# Patient Record
Sex: MALE | Race: WHITE | NOT HISPANIC OR LATINO | Employment: OTHER | URBAN - METROPOLITAN AREA
[De-identification: names, ages, dates, MRNs, and addresses within clinical notes are randomized per-mention and may not be internally consistent; named-entity substitution may affect disease eponyms.]

---

## 2022-04-11 ENCOUNTER — TELEPHONE (OUTPATIENT)
Dept: NEPHROLOGY | Facility: MEDICAL CENTER | Age: 52
End: 2022-04-11

## 2022-04-11 NOTE — TELEPHONE ENCOUNTER
Hello Dr.Najjar,  New patient scheduled to see you via Telemed in May.   Would you like to order labs for him to complete ?

## 2022-05-03 ENCOUNTER — TELEMEDICINE2 (OUTPATIENT)
Dept: NEPHROLOGY | Facility: MEDICAL CENTER | Age: 52
End: 2022-05-03
Payer: MEDICAID

## 2022-05-03 VITALS
HEART RATE: 66 BPM | WEIGHT: 198 LBS | TEMPERATURE: 98 F | BODY MASS INDEX: 30.01 KG/M2 | OXYGEN SATURATION: 96 % | RESPIRATION RATE: 14 BRPM | DIASTOLIC BLOOD PRESSURE: 78 MMHG | SYSTOLIC BLOOD PRESSURE: 120 MMHG | HEIGHT: 68 IN

## 2022-05-03 DIAGNOSIS — N18.9 CHRONIC KIDNEY DISEASE, UNSPECIFIED CKD STAGE: ICD-10-CM

## 2022-05-03 DIAGNOSIS — I10 PRIMARY HYPERTENSION: ICD-10-CM

## 2022-05-03 DIAGNOSIS — N20.0 CALCULUS OF KIDNEY: ICD-10-CM

## 2022-05-03 PROCEDURE — 99204 OFFICE O/P NEW MOD 45 MIN: CPT | Performed by: INTERNAL MEDICINE

## 2022-05-03 RX ORDER — DIAZEPAM 5 MG/1
5 TABLET ORAL
Status: ON HOLD | COMMUNITY
Start: 2022-04-29 | End: 2023-11-25

## 2022-05-03 RX ORDER — ATORVASTATIN CALCIUM 80 MG/1
80 TABLET, FILM COATED ORAL DAILY
Status: ON HOLD | COMMUNITY
Start: 2022-03-02 | End: 2023-11-25 | Stop reason: SDUPTHER

## 2022-05-03 RX ORDER — POTASSIUM CHLORIDE 20 MEQ/1
20 TABLET, EXTENDED RELEASE ORAL
Status: ON HOLD | COMMUNITY
Start: 2022-03-11 | End: 2023-11-25

## 2022-05-03 RX ORDER — NITROGLYCERIN 0.3 MG/1
0.3 TABLET SUBLINGUAL PRN
Status: ON HOLD | COMMUNITY
Start: 2022-03-02 | End: 2023-11-25 | Stop reason: SDUPTHER

## 2022-05-03 RX ORDER — FUROSEMIDE 40 MG/1
40 TABLET ORAL
Status: ON HOLD | COMMUNITY
Start: 2022-03-11 | End: 2023-11-25

## 2022-05-03 RX ORDER — ISOSORBIDE MONONITRATE 120 MG/1
120 TABLET, EXTENDED RELEASE ORAL EVERY MORNING
Status: ON HOLD | COMMUNITY
Start: 2022-03-08 | End: 2023-11-25 | Stop reason: SDUPTHER

## 2022-05-03 RX ORDER — CLOPIDOGREL BISULFATE 75 MG/1
75 TABLET ORAL DAILY
Status: ON HOLD | COMMUNITY
Start: 2022-03-02 | End: 2023-11-25

## 2022-05-03 RX ORDER — HYDROGEN PEROXIDE 2.65 ML/100ML
81 LIQUID ORAL; TOPICAL DAILY
Status: ON HOLD | COMMUNITY
Start: 2022-03-08 | End: 2023-11-25 | Stop reason: SDUPTHER

## 2022-05-03 RX ORDER — EPINEPHRINE 0.3 MG/.3ML
INJECTION SUBCUTANEOUS
Status: ON HOLD | COMMUNITY
Start: 2022-03-08 | End: 2023-11-25 | Stop reason: SDUPTHER

## 2022-05-03 RX ORDER — CARVEDILOL 6.25 MG/1
6.25 TABLET ORAL 2 TIMES DAILY
Status: ON HOLD | COMMUNITY
Start: 2022-03-02 | End: 2023-11-25 | Stop reason: SDUPTHER

## 2022-05-03 RX ORDER — DIPHENHYDRAMINE HYDROCHLORIDE 50 MG/ML
50 INJECTION INTRAMUSCULAR; INTRAVENOUS
Status: ON HOLD | COMMUNITY
Start: 2022-03-16 | End: 2023-11-25

## 2022-05-03 RX ORDER — MEMANTINE HYDROCHLORIDE 10 MG/1
10 TABLET ORAL
Status: ON HOLD | COMMUNITY
Start: 2022-04-29 | End: 2023-11-25 | Stop reason: SDUPTHER

## 2022-05-03 RX ORDER — PRAZOSIN HYDROCHLORIDE 2 MG/1
2 CAPSULE ORAL
Status: ON HOLD | COMMUNITY
Start: 2022-04-29 | End: 2023-11-25

## 2022-05-03 RX ORDER — OMEPRAZOLE 40 MG/1
40 CAPSULE, DELAYED RELEASE ORAL DAILY
Status: ON HOLD | COMMUNITY
Start: 2022-03-08 | End: 2023-11-25 | Stop reason: SDUPTHER

## 2022-05-03 RX ORDER — APIXABAN 5 MG/1
5 TABLET, FILM COATED ORAL 2 TIMES DAILY
Status: ON HOLD | COMMUNITY
Start: 2022-04-07 | End: 2023-11-25 | Stop reason: SDUPTHER

## 2022-05-03 RX ORDER — BACLOFEN 20 MG/1
20 TABLET ORAL
Status: ON HOLD | COMMUNITY
Start: 2022-02-10 | End: 2023-11-25

## 2022-05-03 NOTE — PROGRESS NOTES
Telemedicine: New Patient   This evaluation was conducted via [employer] using secure and encrypted videoconferencing technology. The patient was physically located at [facility] in [place], [place]. The patient was presented by a medical professional at the originating site.   The patient's identity was confirmed and verbal consent was obtained for this telemedicine encounter.    Subjective:     CC:   Chief Complaint   Patient presents with   • Hypertension   • Chronic Kidney Disease       Divnie Lugo is a 51 y.o. male presenting to establish care and to discuss the evaluation and management of: Hypertension and chronic kidney disease  The patient is a very poor historian, he has a history of traumatic brain injury, most of the story was reviewing her record.  Briefly he is a 51-year-old gentleman with a past medical history significant for longstanding pretension, recurrent nephrolithiasis.  Patient also has a history of partial nephrectomy, however the patient is not very sure about the details  Patient has no hematuria no dysuria, he does have chronic pain but no recent use of NSAIDs.    Review of Systems   Unable to perform ROS: Medical condition         Allergies   Allergen Reactions   • Bloodless      Pt states he would accept blood in life or death situation   • Nkda [No Known Drug Allergy]        Current medicines (including changes today)  Current Outpatient Medications   Medication Sig Dispense Refill   • Oxycodone-Acetaminophen (PERCOCET)  MG TABS Take 1-2 Tabs by mouth every 6 hours as needed for Severe Pain. 30 Tab 0   • LISINOPRIL Take 40 mg by mouth every day.     • Sertraline HCl (ZOLOFT PO) Take 100 mg by mouth every day.     • trazodone (DESYREL) 100 MG TABS Take 100 mg by mouth every evening.     • ELIQUIS 5 MG Tab Take 5 mg by mouth 2 times a day.     • EQ ASPIRIN ADULT LOW DOSE 81 MG EC tablet Take 81 mg by mouth every day.     • atorvastatin (LIPITOR) 80 MG tablet Take  80 mg by mouth every day.     • baclofen (LIORESAL) 20 MG tablet Take 20 mg by mouth.     • carvedilol (COREG) 6.25 MG Tab Take 6.25 mg by mouth 2 times a day.     • EPINEPHrine (EPIPEN) 0.3 MG/0.3ML Solution Auto-injector solution for injection INJECT CONTENTS OF 1 PEN SUBCUTANEOUSLY AS NEEDED FOR ALLERGIC REACTION MAY REPEAT DOSE AFTER 5 TO 15 MINTUTES     • furosemide (LASIX) 40 MG Tab Take 40 mg by mouth.     • clopidogrel (PLAVIX) 75 MG Tab Take 75 mg by mouth every day.     • diazePAM (VALIUM) 5 MG Tab Take 5 mg by mouth.     • diphenhydrAMINE (BENADRYL) 50 MG/ML Solution 50 mg by Other route.     • nitroGLYCERIN (NITROSTAT) 0.3 MG SL tablet Place 0.3 mg under the tongue as needed. DISSOLVE ONE TABLET UNDER THE TONGUE EVERY 5 MINUTES AS NEEDED FOR CHEST PAIN DO NOT EXCEED A TOTAL OF 3 DOSES IN 15 MINUTES     • memantine (NAMENDA) 10 MG Tab Take 10 mg by mouth.     • isosorbide mononitrate (IMDUR) 120 MG CR tablet Take 120 mg by mouth every morning.     • omeprazole (PRILOSEC) 40 MG delayed-release capsule Take 40 mg by mouth every day.     • potassium chloride SA (KDUR) 20 MEQ Tab CR Take 20 mEq by mouth.     • prazosin (MINIPRESS) 2 MG Cap Take 2 mg by mouth at bedtime.     • ondansetron (ZOFRAN ODT) 4 MG TBDP Take 1 Tab by mouth every 8 hours as needed for Nausea/Vomiting. (Patient not taking: Reported on 5/3/2022) 12 Tab 0   • morphine SR (MS CONTIN) 15 MG TB12 Take 30 mg by mouth 2 times a day. (Patient not taking: Reported on 5/3/2022)       No current facility-administered medications for this visit.       He  has a past medical history of Angina, Arthritis, Back fracture, Backpain, CAD (coronary artery disease), DVT, Fracture closed, pelvis, HTN (hypertension) (6/7/2012), Hypertension, Lipoma NOS, Myocardial infarct (HCC), Nephrolithiasis (6/7/2012), Nonmalignant tumors, Personal history of venous thrombosis and embolism, Pneumonia, Pulmonary embolism (HCC), Renal disorder, and Unspecified urinary  "incontinence (PE).    He has no past medical history of Arrhythmia, ASTHMA, Bronchitis, Cancer ([facility]), CATARACT, Congestive heart failure ([facility]), COPD, Diabetes, Dialysis, Glaucoma, Heart murmur, Heart valve disease, Indigestion, Infectious disease, Jaundice, Other specified symptom associated with female genital organs, Pacemaker, Psychiatric problem, Rheumatic fever, Seizure ([facility]), Stroke ([facility]), Unspecified disorder of thyroid, or Unspecified hemorrhagic conditions.  He  has a past surgical history that includes other orthopedic surgery; other; other cardiac surgery; cystoscopy stent placement (6/3/2012); ureteroscopy (6/3/2012); and lasertripsy (6/3/2012).      History reviewed. No pertinent family history.  No family status information on file.       Patient Active Problem List    Diagnosis Date Noted   • HTN (hypertension) 06/07/2012   • MI (myocardial infarction) ([facility]) 06/07/2012   • HEMATURIA 06/07/2012   • Nephrolithiasis 06/07/2012   • ARF (acute renal failure) ([facility]) 06/07/2012   • Obesity 06/07/2012          Objective:   /78   Pulse 66   Temp 36.7 °C (98 °F)   Resp 14   Ht 1.727 m (5' 8\")   Wt 89.8 kg (198 lb)   SpO2 96%   BMI 30.11 kg/m²     Physical Exam  Constitutional:       General: He is not in acute distress.     Appearance: Normal appearance. He is not ill-appearing.   HENT:      Head: Normocephalic and atraumatic.      Right Ear: External ear normal.      Left Ear: External ear normal.      Nose: Nose normal.   Eyes:      General: No scleral icterus.        Right eye: No discharge.         Left eye: No discharge.      Conjunctiva/sclera: Conjunctivae normal.   Cardiovascular:      Rate and Rhythm: Normal rate.   Pulmonary:      Effort: Pulmonary effort is normal. No respiratory distress.      Breath sounds: Normal breath sounds. No rhonchi.   Musculoskeletal:         General: No tenderness.      Right lower leg: No edema.      Left lower leg: No edema.   Skin:     General: Skin is warm. "      Coloration: Skin is not jaundiced.   Neurological:      General: No focal deficit present.      Mental Status: He is alert and oriented to person, place, and time. Mental status is at baseline.   Psychiatric:         Mood and Affect: Mood normal.         Behavior: Behavior normal.         Thought Content: Thought content normal.     I reviewed the outside records, recent creatinine from April 2022 was 1.3 mg/dL      Assessment and Plan:   The following treatment plan was discussed:     1. Primary hypertension  Controlled  Continue same medication regimen  Continue low-sodium diet      2. Nephrolithiasis  Patient was advised to be on low-sodium diet  Increase water intake to about 80 ounces per day  - US-RENAL; Future    3. Chronic kidney disease, unspecified CKD stage    Kidney function is within normal range  Check renal ultrasound to evaluate kidney size  Avoid nephrotoxins like NSAIDs  Renal dose on medication  Recheck labs in 3 months      Follow-up: Return in about 3 months (around 8/3/2022).

## 2022-08-03 ENCOUNTER — TELEMEDICINE2 (OUTPATIENT)
Dept: NEPHROLOGY | Facility: MEDICAL CENTER | Age: 52
End: 2022-08-03
Payer: MEDICAID

## 2022-08-03 VITALS
TEMPERATURE: 98.2 F | HEIGHT: 68 IN | HEART RATE: 60 BPM | DIASTOLIC BLOOD PRESSURE: 80 MMHG | SYSTOLIC BLOOD PRESSURE: 136 MMHG | OXYGEN SATURATION: 93 % | BODY MASS INDEX: 29.86 KG/M2 | RESPIRATION RATE: 16 BRPM | WEIGHT: 197 LBS

## 2022-08-03 DIAGNOSIS — N18.9 CHRONIC KIDNEY DISEASE, UNSPECIFIED CKD STAGE: ICD-10-CM

## 2022-08-03 DIAGNOSIS — I10 PRIMARY HYPERTENSION: ICD-10-CM

## 2022-08-03 DIAGNOSIS — N20.0 NEPHROLITHIASIS: ICD-10-CM

## 2022-08-03 PROCEDURE — 99214 OFFICE O/P EST MOD 30 MIN: CPT | Performed by: INTERNAL MEDICINE

## 2022-08-03 RX ORDER — FLUTICASONE PROPIONATE 50 MCG
SPRAY, SUSPENSION (ML) NASAL
Status: ON HOLD | COMMUNITY
Start: 2022-05-24 | End: 2023-11-25

## 2022-08-03 RX ORDER — KETOROLAC TROMETHAMINE 30 MG/ML
INJECTION, SOLUTION INTRAMUSCULAR; INTRAVENOUS
Status: ON HOLD | COMMUNITY
Start: 2022-05-19 | End: 2023-11-25

## 2022-08-03 RX ORDER — AMOXICILLIN AND CLAVULANATE POTASSIUM 875; 125 MG/1; MG/1
TABLET, FILM COATED ORAL
Status: ON HOLD | COMMUNITY
Start: 2022-05-24 | End: 2023-11-25

## 2022-08-03 ASSESSMENT — ENCOUNTER SYMPTOMS
BACK PAIN: 1
VOMITING: 0
FEVER: 0
CHILLS: 0
COUGH: 0
SHORTNESS OF BREATH: 0
NAUSEA: 0

## 2022-08-03 NOTE — PROGRESS NOTES
Telemedicine: Established Patient   This evaluation was conducted via [employer] using secure and encrypted videoconferencing technology. The patient was physically located at [facility] in [place], [place]. The patient was presented by a medical professional at the originating site.   The patient's identity was confirmed and verbal consent was obtained for this telemedicine encounter.    Subjective:   CC:   Chief Complaint   Patient presents with   • Hypertension   • Chronic Kidney Disease       Divine Lugo is a 51 y.o. male presenting for evaluation and management of: CKD, HTN  Patient has a history of longstanding hypertension, his blood pressure is within good control, he has no chest pain, no shortness of breath.  Patient has chronic kidney disease, however recent creatinine from July 25, 2022 was 1.2 mg/dL  Patient has no hematuria, no dysuria.  No recent use of NSAIDs.  Review of Systems   Constitutional: Negative for chills, fever and malaise/fatigue.   Respiratory: Negative for cough and shortness of breath.    Cardiovascular: Negative for chest pain and leg swelling.   Gastrointestinal: Negative for nausea and vomiting.   Genitourinary: Negative for dysuria, frequency and urgency.   Musculoskeletal: Positive for back pain.         Allergies   Allergen Reactions   • Gabapentin Unspecified     Causes patient to seizures   • Bloodless      Pt states he would accept blood in life or death situation   • Nkda [No Known Drug Allergy]        Current medicines (including changes today)  Current Outpatient Medications   Medication Sig Dispense Refill   • ELIQUIS 5 MG Tab Take 5 mg by mouth 2 times a day.     • EQ ASPIRIN ADULT LOW DOSE 81 MG EC tablet Take 81 mg by mouth every day.     • atorvastatin (LIPITOR) 80 MG tablet Take 80 mg by mouth every day.     • baclofen (LIORESAL) 20 MG tablet Take 20 mg by mouth.     • carvedilol (COREG) 6.25 MG Tab Take 6.25 mg by mouth 2 times a day.     • EPINEPHrine  (EPIPEN) 0.3 MG/0.3ML Solution Auto-injector solution for injection INJECT CONTENTS OF 1 PEN SUBCUTANEOUSLY AS NEEDED FOR ALLERGIC REACTION MAY REPEAT DOSE AFTER 5 TO 15 MINTUTES     • furosemide (LASIX) 40 MG Tab Take 40 mg by mouth.     • clopidogrel (PLAVIX) 75 MG Tab Take 75 mg by mouth every day.     • diazePAM (VALIUM) 5 MG Tab Take 5 mg by mouth.     • nitroGLYCERIN (NITROSTAT) 0.3 MG SL tablet Place 0.3 mg under the tongue as needed. DISSOLVE ONE TABLET UNDER THE TONGUE EVERY 5 MINUTES AS NEEDED FOR CHEST PAIN DO NOT EXCEED A TOTAL OF 3 DOSES IN 15 MINUTES     • memantine (NAMENDA) 10 MG Tab Take 10 mg by mouth.     • isosorbide mononitrate (IMDUR) 120 MG CR tablet Take 120 mg by mouth every morning.     • omeprazole (PRILOSEC) 40 MG delayed-release capsule Take 40 mg by mouth every day.     • potassium chloride SA (KDUR) 20 MEQ Tab CR Take 20 mEq by mouth.     • Oxycodone-Acetaminophen (PERCOCET)  MG TABS Take 1-2 Tabs by mouth every 6 hours as needed for Severe Pain. 30 Tab 0   • ondansetron (ZOFRAN ODT) 4 MG TBDP Take 1 Tab by mouth every 8 hours as needed for Nausea/Vomiting. 12 Tab 0   • LISINOPRIL Take 40 mg by mouth every day.     • trazodone (DESYREL) 100 MG TABS Take 500 mg by mouth every evening.     • ketorolac (TORADOL) 30 MG/ML Solution  (Patient not taking: Reported on 8/3/2022)     • fluticasone (FLONASE) 50 MCG/ACT nasal spray  (Patient not taking: Reported on 8/3/2022)     • amoxicillin-clavulanate (AUGMENTIN) 875-125 MG Tab  (Patient not taking: Reported on 8/3/2022)     • diphenhydrAMINE (BENADRYL) 50 MG/ML Solution 50 mg by Other route. (Patient not taking: Reported on 8/3/2022)     • prazosin (MINIPRESS) 2 MG Cap Take 2 mg by mouth at bedtime. (Patient not taking: Reported on 8/3/2022)     • morphine SR (MS CONTIN) 15 MG TB12 Take 30 mg by mouth 2 times a day. (Patient not taking: No sig reported)     • Sertraline HCl (ZOLOFT PO) Take 100 mg by mouth every day. (Patient not taking:  "Reported on 8/3/2022)       No current facility-administered medications for this visit.       Patient Active Problem List    Diagnosis Date Noted   • HTN (hypertension) 06/07/2012   • MI (myocardial infarction) ([facility]) 06/07/2012   • HEMATURIA 06/07/2012   • Nephrolithiasis 06/07/2012   • ARF (acute renal failure) (HCC) 06/07/2012   • Obesity 06/07/2012       No family history on file.    He  has a past medical history of Angina, Arthritis, Back fracture, Backpain, CAD (coronary artery disease), DVT, Fracture closed, pelvis, HTN (hypertension) (6/7/2012), Hypertension, Lipoma NOS, Myocardial infarct (HCC), Nephrolithiasis (6/7/2012), Nonmalignant tumors, Personal history of venous thrombosis and embolism, Pneumonia, Pulmonary embolism (HCC), Renal disorder, and Unspecified urinary incontinence (PE).    He has no past medical history of Arrhythmia, ASTHMA, Bronchitis, Cancer (HCC), CATARACT, Congestive heart failure (HCC), COPD, Diabetes, Dialysis, Glaucoma, Heart murmur, Heart valve disease, Indigestion, Infectious disease, Jaundice, Other specified symptom associated with female genital organs, Pacemaker, Psychiatric problem, Rheumatic fever, Seizure (HCC), Stroke (HCC), Unspecified disorder of thyroid, or Unspecified hemorrhagic conditions.  He  has a past surgical history that includes other orthopedic surgery; other; other cardiac surgery; cystoscopy stent placement (6/3/2012); ureteroscopy (6/3/2012); and lasertripsy (6/3/2012).       Objective:   /80 (BP Location: Right arm, Patient Position: Sitting, BP Cuff Size: Adult)   Pulse 60   Temp 36.8 °C (98.2 °F) (Temporal)   Resp 16   Ht 1.727 m (5' 8\")   Wt 89.4 kg (197 lb)   SpO2 93%   BMI 29.95 kg/m²     Physical Exam  Constitutional:       General: He is not in acute distress.     Appearance: Normal appearance. He is not ill-appearing.   HENT:      Head: Normocephalic and atraumatic.      Nose: Nose normal.   Eyes:      General: No scleral " icterus.     Conjunctiva/sclera: Conjunctivae normal.   Pulmonary:      Effort: Pulmonary effort is normal.   Skin:     General: Skin is warm.      Coloration: Skin is not jaundiced.   Neurological:      General: No focal deficit present.      Mental Status: He is alert and oriented to person, place, and time. Mental status is at baseline.   Psychiatric:         Mood and Affect: Mood normal.         Thought Content: Thought content normal.     Recent lab from July 25, 2022 were reviewed    Assessment and Plan:   The following treatment plan was discussed:     1. Primary hypertension  Controlled  Continue same medication regimen  Continue low-sodium diet  Thank you    2. Chronic kidney disease, unspecified CKD stage  Stable  No uremic symptoms  Renal dose of medication  Avoid nephrotoxins  Continue same medication regimen  Check labs annually  Follow-up in the renal clinic on as-needed basis    3. Nephrolithiasis  Low-sodium diet  Increase water intake  Check renal ultrasound if back pain continues        Follow-up: No follow-ups on file.

## 2023-11-21 ENCOUNTER — HOSPITAL ENCOUNTER (OUTPATIENT)
Dept: RADIOLOGY | Facility: MEDICAL CENTER | Age: 53
End: 2023-11-21

## 2023-11-21 ENCOUNTER — HOSPITAL ENCOUNTER (INPATIENT)
Facility: MEDICAL CENTER | Age: 53
LOS: 4 days | DRG: 065 | End: 2023-11-25
Attending: STUDENT IN AN ORGANIZED HEALTH CARE EDUCATION/TRAINING PROGRAM | Admitting: STUDENT IN AN ORGANIZED HEALTH CARE EDUCATION/TRAINING PROGRAM
Payer: MEDICAID

## 2023-11-21 DIAGNOSIS — I63.9 ACUTE CVA (CEREBROVASCULAR ACCIDENT) (HCC): ICD-10-CM

## 2023-11-21 DIAGNOSIS — I63.9 STROKE DETERMINED BY CLINICAL ASSESSMENT (HCC): ICD-10-CM

## 2023-11-21 DIAGNOSIS — N20.0 CALCULUS OF KIDNEY: ICD-10-CM

## 2023-11-21 DIAGNOSIS — I25.10 CORONARY ARTERY DISEASE WITHOUT ANGINA PECTORIS, UNSPECIFIED VESSEL OR LESION TYPE, UNSPECIFIED WHETHER NATIVE OR TRANSPLANTED HEART: ICD-10-CM

## 2023-11-21 DIAGNOSIS — K21.9 GASTROESOPHAGEAL REFLUX DISEASE, UNSPECIFIED WHETHER ESOPHAGITIS PRESENT: ICD-10-CM

## 2023-11-21 DIAGNOSIS — G89.29 CHRONIC NECK PAIN: ICD-10-CM

## 2023-11-21 DIAGNOSIS — Z86.59 HISTORY OF POSTTRAUMATIC STRESS DISORDER (PTSD): ICD-10-CM

## 2023-11-21 DIAGNOSIS — M54.2 CHRONIC NECK PAIN: ICD-10-CM

## 2023-11-21 LAB
EST. AVERAGE GLUCOSE BLD GHB EST-MCNC: 97 MG/DL
HBA1C MFR BLD: 5 % (ref 4–5.6)

## 2023-11-21 PROCEDURE — 700111 HCHG RX REV CODE 636 W/ 250 OVERRIDE (IP): Mod: JZ | Performed by: STUDENT IN AN ORGANIZED HEALTH CARE EDUCATION/TRAINING PROGRAM

## 2023-11-21 PROCEDURE — 700102 HCHG RX REV CODE 250 W/ 637 OVERRIDE(OP): Performed by: STUDENT IN AN ORGANIZED HEALTH CARE EDUCATION/TRAINING PROGRAM

## 2023-11-21 PROCEDURE — 770006 HCHG ROOM/CARE - MED/SURG/GYN SEMI*

## 2023-11-21 PROCEDURE — A9270 NON-COVERED ITEM OR SERVICE: HCPCS | Performed by: STUDENT IN AN ORGANIZED HEALTH CARE EDUCATION/TRAINING PROGRAM

## 2023-11-21 PROCEDURE — 83036 HEMOGLOBIN GLYCOSYLATED A1C: CPT

## 2023-11-21 PROCEDURE — 36415 COLL VENOUS BLD VENIPUNCTURE: CPT

## 2023-11-21 PROCEDURE — 99223 1ST HOSP IP/OBS HIGH 75: CPT | Performed by: STUDENT IN AN ORGANIZED HEALTH CARE EDUCATION/TRAINING PROGRAM

## 2023-11-21 RX ORDER — ISOSORBIDE MONONITRATE 60 MG/1
120 TABLET, EXTENDED RELEASE ORAL EVERY MORNING
Status: DISCONTINUED | OUTPATIENT
Start: 2023-11-22 | End: 2023-11-25 | Stop reason: HOSPADM

## 2023-11-21 RX ORDER — MORPHINE SULFATE 4 MG/ML
4 INJECTION INTRAVENOUS EVERY 4 HOURS PRN
Status: DISCONTINUED | OUTPATIENT
Start: 2023-11-21 | End: 2023-11-25 | Stop reason: HOSPADM

## 2023-11-21 RX ORDER — ACETAMINOPHEN 325 MG/1
650 TABLET ORAL EVERY 6 HOURS PRN
Status: DISCONTINUED | OUTPATIENT
Start: 2023-11-21 | End: 2023-11-25 | Stop reason: HOSPADM

## 2023-11-21 RX ORDER — MEMANTINE HYDROCHLORIDE 10 MG/1
10 TABLET ORAL DAILY
Status: DISCONTINUED | OUTPATIENT
Start: 2023-11-22 | End: 2023-11-25 | Stop reason: HOSPADM

## 2023-11-21 RX ORDER — ASPIRIN 81 MG/1
81 TABLET ORAL DAILY
Status: DISCONTINUED | OUTPATIENT
Start: 2023-11-22 | End: 2023-11-25 | Stop reason: HOSPADM

## 2023-11-21 RX ORDER — CARVEDILOL 6.25 MG/1
6.25 TABLET ORAL 2 TIMES DAILY
Status: DISCONTINUED | OUTPATIENT
Start: 2023-11-21 | End: 2023-11-25 | Stop reason: HOSPADM

## 2023-11-21 RX ORDER — ATORVASTATIN CALCIUM 80 MG/1
80 TABLET, FILM COATED ORAL DAILY
Status: DISCONTINUED | OUTPATIENT
Start: 2023-11-22 | End: 2023-11-25 | Stop reason: HOSPADM

## 2023-11-21 RX ORDER — CLOPIDOGREL BISULFATE 75 MG/1
75 TABLET ORAL DAILY
Status: DISCONTINUED | OUTPATIENT
Start: 2023-11-22 | End: 2023-11-22

## 2023-11-21 RX ADMIN — ACETAMINOPHEN 650 MG: 325 TABLET, FILM COATED ORAL at 23:10

## 2023-11-21 RX ADMIN — MORPHINE SULFATE 4 MG: 4 INJECTION, SOLUTION INTRAMUSCULAR; INTRAVENOUS at 21:39

## 2023-11-21 RX ADMIN — APIXABAN 5 MG: 5 TABLET, FILM COATED ORAL at 23:07

## 2023-11-21 RX ADMIN — TRAZODONE HYDROCHLORIDE 500 MG: 150 TABLET ORAL at 23:07

## 2023-11-21 RX ADMIN — CARVEDILOL 6.25 MG: 6.25 TABLET, FILM COATED ORAL at 23:07

## 2023-11-21 ASSESSMENT — ENCOUNTER SYMPTOMS
CARDIOVASCULAR NEGATIVE: 1
PSYCHIATRIC NEGATIVE: 1
EYES NEGATIVE: 1
GASTROINTESTINAL NEGATIVE: 1
MUSCULOSKELETAL NEGATIVE: 1
FOCAL WEAKNESS: 1
RESPIRATORY NEGATIVE: 1
WEAKNESS: 1

## 2023-11-21 ASSESSMENT — LIFESTYLE VARIABLES
HAVE YOU EVER FELT YOU SHOULD CUT DOWN ON YOUR DRINKING: NO
TOTAL SCORE: 0
ON A TYPICAL DAY WHEN YOU DRINK ALCOHOL HOW MANY DRINKS DO YOU HAVE: 0
EVER FELT BAD OR GUILTY ABOUT YOUR DRINKING: NO
TOTAL SCORE: 0
EVER HAD A DRINK FIRST THING IN THE MORNING TO STEADY YOUR NERVES TO GET RID OF A HANGOVER: NO
CONSUMPTION TOTAL: NEGATIVE
DOES PATIENT WANT TO STOP DRINKING: NO
ON A TYPICAL DAY WHEN YOU DRINK ALCOHOL HOW MANY DRINKS DO YOU HAVE: 0
EVER FELT BAD OR GUILTY ABOUT YOUR DRINKING: NO
HOW MANY TIMES IN THE PAST YEAR HAVE YOU HAD 5 OR MORE DRINKS IN A DAY: 0
HAVE YOU EVER FELT YOU SHOULD CUT DOWN ON YOUR DRINKING: NO
TOTAL SCORE: 0
ALCOHOL_USE: NO
AVERAGE NUMBER OF DAYS PER WEEK YOU HAVE A DRINK CONTAINING ALCOHOL: 0
HAVE PEOPLE ANNOYED YOU BY CRITICIZING YOUR DRINKING: NO
HOW MANY TIMES IN THE PAST YEAR HAVE YOU HAD 5 OR MORE DRINKS IN A DAY: 0
AVERAGE NUMBER OF DAYS PER WEEK YOU HAVE A DRINK CONTAINING ALCOHOL: 0
TOTAL SCORE: 0
ALCOHOL_USE: NO
TOTAL SCORE: 0
CONSUMPTION TOTAL: NEGATIVE
HAVE PEOPLE ANNOYED YOU BY CRITICIZING YOUR DRINKING: NO
EVER HAD A DRINK FIRST THING IN THE MORNING TO STEADY YOUR NERVES TO GET RID OF A HANGOVER: NO
TOTAL SCORE: 0

## 2023-11-21 ASSESSMENT — COGNITIVE AND FUNCTIONAL STATUS - GENERAL
SUGGESTED CMS G CODE MODIFIER DAILY ACTIVITY: CK
EATING MEALS: A LITTLE
STANDING UP FROM CHAIR USING ARMS: A LITTLE
WALKING IN HOSPITAL ROOM: A LITTLE
MOVING TO AND FROM BED TO CHAIR: A LITTLE
MOVING FROM LYING ON BACK TO SITTING ON SIDE OF FLAT BED: A LITTLE
TURNING FROM BACK TO SIDE WHILE IN FLAT BAD: A LITTLE
DRESSING REGULAR UPPER BODY CLOTHING: A LITTLE
DRESSING REGULAR UPPER BODY CLOTHING: A LITTLE
CLIMB 3 TO 5 STEPS WITH RAILING: A LITTLE
DAILY ACTIVITIY SCORE: 18
SUGGESTED CMS G CODE MODIFIER DAILY ACTIVITY: CK
STANDING UP FROM CHAIR USING ARMS: A LITTLE
PERSONAL GROOMING: A LITTLE
SUGGESTED CMS G CODE MODIFIER MOBILITY: CK
SUGGESTED CMS G CODE MODIFIER MOBILITY: CK
MOBILITY SCORE: 18
DRESSING REGULAR LOWER BODY CLOTHING: A LITTLE
DRESSING REGULAR LOWER BODY CLOTHING: A LITTLE
MOVING TO AND FROM BED TO CHAIR: A LITTLE
TOILETING: A LITTLE
DAILY ACTIVITIY SCORE: 18
MOVING FROM LYING ON BACK TO SITTING ON SIDE OF FLAT BED: A LITTLE
TURNING FROM BACK TO SIDE WHILE IN FLAT BAD: A LITTLE
HELP NEEDED FOR BATHING: A LITTLE
HELP NEEDED FOR BATHING: A LITTLE
PERSONAL GROOMING: A LITTLE
EATING MEALS: A LITTLE
WALKING IN HOSPITAL ROOM: A LITTLE
CLIMB 3 TO 5 STEPS WITH RAILING: A LITTLE
MOBILITY SCORE: 18
TOILETING: A LITTLE

## 2023-11-21 ASSESSMENT — PATIENT HEALTH QUESTIONNAIRE - PHQ9
2. FEELING DOWN, DEPRESSED, IRRITABLE, OR HOPELESS: NOT AT ALL
1. LITTLE INTEREST OR PLEASURE IN DOING THINGS: NOT AT ALL
SUM OF ALL RESPONSES TO PHQ9 QUESTIONS 1 AND 2: 0
SUM OF ALL RESPONSES TO PHQ9 QUESTIONS 1 AND 2: 0
2. FEELING DOWN, DEPRESSED, IRRITABLE, OR HOPELESS: NOT AT ALL
1. LITTLE INTEREST OR PLEASURE IN DOING THINGS: NOT AT ALL

## 2023-11-21 ASSESSMENT — FIBROSIS 4 INDEX: FIB4 SCORE: 1.19

## 2023-11-21 ASSESSMENT — PAIN DESCRIPTION - PAIN TYPE: TYPE: ACUTE PAIN

## 2023-11-22 PROBLEM — I25.10 CAD (CORONARY ARTERY DISEASE): Status: ACTIVE | Noted: 2023-11-22

## 2023-11-22 PROBLEM — M54.2 CHRONIC NECK PAIN: Status: ACTIVE | Noted: 2023-11-22

## 2023-11-22 PROBLEM — R53.1 RIGHT SIDED WEAKNESS: Status: ACTIVE | Noted: 2023-11-22

## 2023-11-22 PROBLEM — G89.29 CHRONIC NECK PAIN: Status: ACTIVE | Noted: 2023-11-22

## 2023-11-22 LAB
ALBUMIN SERPL BCP-MCNC: 3.5 G/DL (ref 3.2–4.9)
ALBUMIN/GLOB SERPL: 1.4 G/DL
ALP SERPL-CCNC: 95 U/L (ref 30–99)
ALT SERPL-CCNC: 23 U/L (ref 2–50)
ANION GAP SERPL CALC-SCNC: 12 MMOL/L (ref 7–16)
AST SERPL-CCNC: 21 U/L (ref 12–45)
BILIRUB SERPL-MCNC: 0.4 MG/DL (ref 0.1–1.5)
BUN SERPL-MCNC: 14 MG/DL (ref 8–22)
CALCIUM ALBUM COR SERPL-MCNC: 8.7 MG/DL (ref 8.5–10.5)
CALCIUM SERPL-MCNC: 8.3 MG/DL (ref 8.5–10.5)
CHLORIDE SERPL-SCNC: 105 MMOL/L (ref 96–112)
CHOLEST SERPL-MCNC: 157 MG/DL (ref 100–199)
CO2 SERPL-SCNC: 24 MMOL/L (ref 20–33)
CREAT SERPL-MCNC: 1.25 MG/DL (ref 0.5–1.4)
ERYTHROCYTE [DISTWIDTH] IN BLOOD BY AUTOMATED COUNT: 48.9 FL (ref 35.9–50)
GFR SERPLBLD CREATININE-BSD FMLA CKD-EPI: 68 ML/MIN/1.73 M 2
GLOBULIN SER CALC-MCNC: 2.5 G/DL (ref 1.9–3.5)
GLUCOSE SERPL-MCNC: 111 MG/DL (ref 65–99)
HCT VFR BLD AUTO: 44.6 % (ref 42–52)
HDLC SERPL-MCNC: 35 MG/DL
HGB BLD-MCNC: 15.3 G/DL (ref 14–18)
LDLC SERPL CALC-MCNC: 84 MG/DL
MCH RBC QN AUTO: 31.4 PG (ref 27–33)
MCHC RBC AUTO-ENTMCNC: 34.3 G/DL (ref 32.3–36.5)
MCV RBC AUTO: 91.4 FL (ref 81.4–97.8)
PLATELET # BLD AUTO: 203 K/UL (ref 164–446)
PMV BLD AUTO: 10.4 FL (ref 9–12.9)
POTASSIUM SERPL-SCNC: 4.2 MMOL/L (ref 3.6–5.5)
PROT SERPL-MCNC: 6 G/DL (ref 6–8.2)
RBC # BLD AUTO: 4.88 M/UL (ref 4.7–6.1)
SODIUM SERPL-SCNC: 141 MMOL/L (ref 135–145)
TRIGL SERPL-MCNC: 190 MG/DL (ref 0–149)
WBC # BLD AUTO: 8.8 K/UL (ref 4.8–10.8)

## 2023-11-22 PROCEDURE — 97166 OT EVAL MOD COMPLEX 45 MIN: CPT

## 2023-11-22 PROCEDURE — 80053 COMPREHEN METABOLIC PANEL: CPT

## 2023-11-22 PROCEDURE — 85027 COMPLETE CBC AUTOMATED: CPT

## 2023-11-22 PROCEDURE — 99233 SBSQ HOSP IP/OBS HIGH 50: CPT | Performed by: STUDENT IN AN ORGANIZED HEALTH CARE EDUCATION/TRAINING PROGRAM

## 2023-11-22 PROCEDURE — 700102 HCHG RX REV CODE 250 W/ 637 OVERRIDE(OP)

## 2023-11-22 PROCEDURE — A9270 NON-COVERED ITEM OR SERVICE: HCPCS

## 2023-11-22 PROCEDURE — 700102 HCHG RX REV CODE 250 W/ 637 OVERRIDE(OP): Performed by: STUDENT IN AN ORGANIZED HEALTH CARE EDUCATION/TRAINING PROGRAM

## 2023-11-22 PROCEDURE — 700111 HCHG RX REV CODE 636 W/ 250 OVERRIDE (IP): Mod: JZ | Performed by: STUDENT IN AN ORGANIZED HEALTH CARE EDUCATION/TRAINING PROGRAM

## 2023-11-22 PROCEDURE — 80061 LIPID PANEL: CPT

## 2023-11-22 PROCEDURE — 36415 COLL VENOUS BLD VENIPUNCTURE: CPT

## 2023-11-22 PROCEDURE — A9270 NON-COVERED ITEM OR SERVICE: HCPCS | Performed by: STUDENT IN AN ORGANIZED HEALTH CARE EDUCATION/TRAINING PROGRAM

## 2023-11-22 PROCEDURE — 770020 HCHG ROOM/CARE - TELE (206)

## 2023-11-22 PROCEDURE — 97163 PT EVAL HIGH COMPLEX 45 MIN: CPT

## 2023-11-22 PROCEDURE — 700111 HCHG RX REV CODE 636 W/ 250 OVERRIDE (IP): Performed by: STUDENT IN AN ORGANIZED HEALTH CARE EDUCATION/TRAINING PROGRAM

## 2023-11-22 PROCEDURE — 99254 IP/OBS CNSLTJ NEW/EST MOD 60: CPT | Performed by: PSYCHIATRY & NEUROLOGY

## 2023-11-22 RX ORDER — ONDANSETRON 4 MG/1
4 TABLET, ORALLY DISINTEGRATING ORAL EVERY 4 HOURS PRN
Status: DISCONTINUED | OUTPATIENT
Start: 2023-11-22 | End: 2023-11-25 | Stop reason: HOSPADM

## 2023-11-22 RX ORDER — OXYCODONE HYDROCHLORIDE 10 MG/1
10 TABLET ORAL EVERY 6 HOURS PRN
Status: DISCONTINUED | OUTPATIENT
Start: 2023-11-22 | End: 2023-11-25 | Stop reason: HOSPADM

## 2023-11-22 RX ORDER — BUTALBITAL, ACETAMINOPHEN AND CAFFEINE 50; 325; 40 MG/1; MG/1; MG/1
1 TABLET ORAL ONCE
Status: COMPLETED | OUTPATIENT
Start: 2023-11-22 | End: 2023-11-22

## 2023-11-22 RX ORDER — OXYCODONE HYDROCHLORIDE 5 MG/1
5 TABLET ORAL EVERY 4 HOURS PRN
Status: DISCONTINUED | OUTPATIENT
Start: 2023-11-22 | End: 2023-11-25 | Stop reason: HOSPADM

## 2023-11-22 RX ORDER — OMEPRAZOLE 20 MG/1
40 CAPSULE, DELAYED RELEASE ORAL DAILY
Status: DISCONTINUED | OUTPATIENT
Start: 2023-11-22 | End: 2023-11-25 | Stop reason: HOSPADM

## 2023-11-22 RX ORDER — CYCLOBENZAPRINE HCL 10 MG
10 TABLET ORAL 3 TIMES DAILY PRN
Status: DISCONTINUED | OUTPATIENT
Start: 2023-11-22 | End: 2023-11-25 | Stop reason: HOSPADM

## 2023-11-22 RX ORDER — LORAZEPAM 2 MG/ML
1 INJECTION INTRAMUSCULAR
Status: COMPLETED | OUTPATIENT
Start: 2023-11-22 | End: 2023-11-23

## 2023-11-22 RX ADMIN — MEMANTINE HYDROCHLORIDE 10 MG: 10 TABLET ORAL at 06:31

## 2023-11-22 RX ADMIN — APIXABAN 5 MG: 5 TABLET, FILM COATED ORAL at 16:25

## 2023-11-22 RX ADMIN — APIXABAN 5 MG: 5 TABLET, FILM COATED ORAL at 06:32

## 2023-11-22 RX ADMIN — MORPHINE SULFATE 4 MG: 4 INJECTION, SOLUTION INTRAMUSCULAR; INTRAVENOUS at 08:37

## 2023-11-22 RX ADMIN — OMEPRAZOLE 40 MG: 20 CAPSULE, DELAYED RELEASE ORAL at 18:13

## 2023-11-22 RX ADMIN — CLOPIDOGREL BISULFATE 75 MG: 75 TABLET ORAL at 06:31

## 2023-11-22 RX ADMIN — CYCLOBENZAPRINE 10 MG: 10 TABLET, FILM COATED ORAL at 21:20

## 2023-11-22 RX ADMIN — TRAZODONE HYDROCHLORIDE 500 MG: 150 TABLET ORAL at 20:13

## 2023-11-22 RX ADMIN — OXYCODONE HYDROCHLORIDE 10 MG: 10 TABLET ORAL at 20:14

## 2023-11-22 RX ADMIN — BUTALBITAL, ACETAMINOPHEN AND CAFFEINE 1 TABLET: 325; 50; 40 TABLET ORAL at 06:30

## 2023-11-22 RX ADMIN — OXYCODONE HYDROCHLORIDE 10 MG: 10 TABLET ORAL at 13:53

## 2023-11-22 RX ADMIN — MORPHINE SULFATE 4 MG: 4 INJECTION, SOLUTION INTRAMUSCULAR; INTRAVENOUS at 02:03

## 2023-11-22 RX ADMIN — CARVEDILOL 6.25 MG: 6.25 TABLET, FILM COATED ORAL at 06:31

## 2023-11-22 RX ADMIN — ATORVASTATIN CALCIUM 80 MG: 80 TABLET, FILM COATED ORAL at 06:32

## 2023-11-22 RX ADMIN — ASPIRIN 81 MG: 81 TABLET, COATED ORAL at 06:32

## 2023-11-22 RX ADMIN — ONDANSETRON 4 MG: 4 TABLET, ORALLY DISINTEGRATING ORAL at 15:38

## 2023-11-22 RX ADMIN — MORPHINE SULFATE 4 MG: 4 INJECTION, SOLUTION INTRAMUSCULAR; INTRAVENOUS at 16:26

## 2023-11-22 RX ADMIN — ISOSORBIDE MONONITRATE 120 MG: 60 TABLET, EXTENDED RELEASE ORAL at 06:32

## 2023-11-22 RX ADMIN — CARVEDILOL 6.25 MG: 6.25 TABLET, FILM COATED ORAL at 16:25

## 2023-11-22 ASSESSMENT — COGNITIVE AND FUNCTIONAL STATUS - GENERAL
SUGGESTED CMS G CODE MODIFIER DAILY ACTIVITY: CK
DAILY ACTIVITIY SCORE: 19
TOILETING: A LITTLE
DRESSING REGULAR LOWER BODY CLOTHING: A LITTLE
WALKING IN HOSPITAL ROOM: A LITTLE
CLIMB 3 TO 5 STEPS WITH RAILING: A LOT
SUGGESTED CMS G CODE MODIFIER MOBILITY: CJ
PERSONAL GROOMING: A LITTLE
DRESSING REGULAR UPPER BODY CLOTHING: A LITTLE
HELP NEEDED FOR BATHING: A LITTLE
MOBILITY SCORE: 21

## 2023-11-22 ASSESSMENT — ENCOUNTER SYMPTOMS
WEAKNESS: 1
BACK PAIN: 1
NECK PAIN: 1

## 2023-11-22 ASSESSMENT — GAIT ASSESSMENTS: GAIT LEVEL OF ASSIST: CONTACT GUARD ASSIST

## 2023-11-22 ASSESSMENT — PAIN DESCRIPTION - PAIN TYPE
TYPE: ACUTE PAIN

## 2023-11-22 ASSESSMENT — ACTIVITIES OF DAILY LIVING (ADL): TOILETING: INDEPENDENT

## 2023-11-22 ASSESSMENT — FIBROSIS 4 INDEX: FIB4 SCORE: 1.19

## 2023-11-22 NOTE — PROGRESS NOTES
4 Eyes Skin Assessment Completed by JACQUELINE Gonzalez and JACQUELINE Mon.    Head WDL  Ears WDL  Nose WDL  Mouth WDL  Neck WDL  Breast/Chest WDL  Shoulder Blades WDL  Spine WDL  (R) Arm/Elbow/Hand WDL  (L) Arm/Elbow/Hand WDL  Abdomen WDL  Groin WDL  Scrotum/Coccyx/Buttocks WDL  (R) Leg WDL  (L) Leg WDL  (R) Heel/Foot/Toe Discoloration, dryness, cracking  (L) Heel/Foot/Toe Discoloration, dryness, cracking          Devices In Places Tele Box and Blood Pressure Cuff      Interventions In Place NC W/Ear Foams and Heel Mepilex    Possible Skin Injury Yes    Pictures Uploaded Into Epic Yes  Wound Consult Placed Yes  RN Wound Prevention Protocol Ordered Yes
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
## 2023-11-22 NOTE — ASSESSMENT & PLAN NOTE
Noted to have worsening right upper extremity and right lower extremity weakness, patient on Eliquis, therefore tPA contraindicated  CT head and neck negative for acute pathology  Neurology consulted by ERP at outside facility, recommend stroke workup. MRI brain wo contrast in am  Neurocheck every 4 hours

## 2023-11-22 NOTE — CARE PLAN
The patient is Stable - Low risk of patient condition declining or worsening    Shift Goals  Clinical Goals: neuro monitoring  Patient Goals: MRI  Family Goals: JOE    Progress made toward(s) clinical / shift goals:    Problem: Knowledge Deficit - Standard  Goal: Patient and family/care givers will demonstrate understanding of plan of care, disease process/condition, diagnostic tests and medications  Outcome: Progressing  Note: Patient medicated per MAR for severe HA.      Problem: Neuro Status  Goal: Neuro status will remain stable or improve  Outcome: Progressing  Note: Q 4 neuro checks and NIH stroke scale in use. Patient A&O x 4, numbness/tingling present in the RU/LE. NIH score of 5.        Patient is not progressing towards the following goals: N/A

## 2023-11-22 NOTE — PROGRESS NOTES
52-year-old male with CVA on Eliquis with residual right lower extremity weakness and CAD status post 5 stents in the past presents to ED for worsening right lower extremity strength and worsening numbness.  CT head and neck negative for acute pathology.  Neurology at [facility] has been consulted, no acute intervention and recommends transfer for MRI brain to rule out CVA.

## 2023-11-22 NOTE — WOUND TEAM
[facility]  Inpatient Services  Wound and Skin Care Brief Evaluation    Admission Date: 11/21/2023     Last order of IP CONSULT TO WOUND CARE was found on 11/21/2023 from Hospital Encounter on 11/21/2023     HPI, PMH, SH: Reviewed    No chief complaint on file.    Diagnosis: Stroke determined by clinical assessment ([facility]) [I63.9]    Unit where seen by Wound Team: S190/02     Wound consult placed regarding B/L dry heels. Chart and images reviewed. . This clinician in to assess patient. Patient pleasant and agreeable. .     No pressure injuries or advanced wound care needs identified. Wound consult completed. No further follow up unless indicated and consulted.          PREVENTATIVE INTERVENTIONS:    Q shift Haseeb - performed per nursing policy  Q shift pressure point assessments - performed per nursing policy    Surface/Positioning  Standard/trauma mattress - Currently in Place  Reposition q 2 hours - Currently in Place  Waffle overlay  - Currently in Place    Offloading/Redistribution  Heel offloading dressing (Silicone dressing) - Currently in Place      Mobilization      Up to chair

## 2023-11-22 NOTE — H&P
Hospital Medicine History & Physical Note    Date of Service  11/21/2023    Primary Care Physician  Pete Delacruz P.A.-C.    Consultants  Neurology    Code Status  Full Code    Chief Complaint  Possible CVA    History of Presenting Illness  Divine Lugo is a 52 y.o. male who presented right-sided weakness.  Patient has a history of CAD status post 5 stents, previous CVA on aspirin and Eliquis with residual right upper and lower extremity weakness woke up this morning at 2 AM with worsening right upper and lower extremity weakness.  He states that at baseline, he is able to ambulate despite his previous CVA, however he endorses worsening weakness in his right upper and lower extremity.  Endorses associated generalized headache that started this afternoon.  He tried to wait it off throughout the day, however he continued to be weak and noted to have a persistent headache, prompting him to come to ER for evaluation.    At outside facility, patient was given morphine with improvement in his headache.  CT head and neck negative for acute intracranial abnormality.  CHEM panel and BMP unremarkable.  Neurology consulted, agreed for transfer for MRI brain without contrast in AM.  No acute intervention from neurology.    I discussed the plan of care with patient.    Review of Systems  Review of Systems   Constitutional:  Positive for malaise/fatigue.   HENT: Negative.     Eyes: Negative.    Respiratory: Negative.     Cardiovascular: Negative.    Gastrointestinal: Negative.    Genitourinary: Negative.    Musculoskeletal: Negative.    Skin: Negative.    Neurological:  Positive for focal weakness and weakness.   Endo/Heme/Allergies: Negative.    Psychiatric/Behavioral: Negative.         Past Medical History   has a past medical history of Angina, Arthritis, Back fracture, Backpain, CAD (coronary artery disease), DVT, Fracture closed, pelvis, HTN (hypertension) (6/7/2012), Hypertension, Lipoma NOS, Myocardial infarct  ([facility]), Nephrolithiasis (6/7/2012), Nonmalignant tumors, Personal history of venous thrombosis and embolism, Pneumonia, Pulmonary embolism (HCC), Renal disorder, Stroke determined by clinical assessment ([facility]) (11/21/2023), and Unspecified urinary incontinence (PE).    Surgical History   has a past surgical history that includes other orthopedic surgery; other; other cardiac surgery; cystoscopy stent placement (6/3/2012); ureteroscopy (6/3/2012); and lasertripsy (6/3/2012).     Family History  family history is not on file.   Family history reviewed with patient. There is no family history that is pertinent to the chief complaint.     Social History   reports that he has been smoking cigarettes. His smokeless tobacco use includes chew. He reports that he does not drink alcohol and does not use drugs.    Allergies  Allergies   Allergen Reactions    Gabapentin Unspecified     Causes patient to seizures    Bloodless      Pt states he would accept blood in life or death situation    Nkda [No Known Drug Allergy]        Medications  Cannot display prior to admission medications because the patient has not been admitted in this contact.       Physical Exam  Temp:  [36.6 °C (97.9 °F)] 36.6 °C (97.9 °F)  Pulse:  [72] 72  Resp:  [18] 18  BP: (164)/(93) 164/93  SpO2:  [92 %] 92 %  Blood Pressure: (!) 164/93   Temperature: 36.6 °C (97.9 °F)   Pulse: 72   Respiration: 18   Pulse Oximetry: 92 %       Physical Exam  Constitutional:       Appearance: Normal appearance. He is obese.   HENT:      Head: Normocephalic.      Nose: Nose normal.      Mouth/Throat:      Mouth: Mucous membranes are moist.   Eyes:      Pupils: Pupils are equal, round, and reactive to light.   Cardiovascular:      Rate and Rhythm: Normal rate and regular rhythm.   Pulmonary:      Effort: Pulmonary effort is normal.      Breath sounds: Normal breath sounds.   Abdominal:      General: Abdomen is flat.      Palpations: Abdomen is soft.   Musculoskeletal:       "Cervical back: Neck supple.   Skin:     General: Skin is warm.   Neurological:      Mental Status: He is alert and oriented to person, place, and time. Mental status is at baseline.      Comments: Right upper extremity strength 4 out of 5  Right lower extremity strength 3 out of 5  Left upper and lower extremity strength 5 out of 5   Psychiatric:         Mood and Affect: Mood normal.         Behavior: Behavior normal.         Thought Content: Thought content normal.         Judgment: Judgment normal.         Laboratory:          No results for input(s): \"ALTSGPT\", \"ASTSGOT\", \"ALKPHOSPHAT\", \"TBILIRUBIN\", \"DBILIRUBIN\", \"GAMMAGT\", \"AMYLASE\", \"LIPASE\", \"ALB\", \"PREALBUMIN\", \"GLUCOSE\" in the last 72 hours.      No results for input(s): \"NTPROBNP\" in the last 72 hours.      No results for input(s): \"TROPONINT\" in the last 72 hours.    Imaging:  MR-BRAIN-W/O    (Results Pending)       no X-Ray or EKG requiring interpretation    Assessment/Plan:  Justification for Admission Status  I anticipate this patient will require at least two midnights for appropriate medical management, necessitating inpatient admission because patient has right upper and lower extremity weakness, admitted to rule out CVA    Patient will need a Med/Surg bed on NEUROLOGY service .  The need is secondary to possible CVA.    * Stroke determined by clinical assessment (HCC)- (present on admission)  Assessment & Plan  Noted to have worsening right upper extremity and right lower extremity weakness, patient on Eliquis, therefore tPA contraindicated  CT head and neck negative for acute pathology  Neurology consulted by ERP at outside facility, recommend stroke workup. MRI brain wo contrast in am  Neurocheck every 4 hours        VTE prophylaxis: therapeutic anticoagulation with Eliquis  "

## 2023-11-22 NOTE — CARE PLAN
The patient is Stable - Low risk of patient condition declining or worsening    Shift Goals  Clinical Goals: Stable neuro status, MRI  Patient Goals: MRI, pain control  Family Goals: JOE    Progress made toward(s) clinical / shift goals:    Problem: Mobility - Stroke  Goal: Patient's capacity to carry out activities will improve  Description: Target End Date:  Prior to discharge or change in level of care    1.  Assess for barriers to mobility/activity  2.  Implement activity per interdisciplinary team recommendations  3.  Target activity level identified and patient/family/caregiver aware of goal  4.  Provide assistive devices  5.  Instruct patient/caregiver on proper use of assistive/adaptive devices  6.  Schedule activities and rest periods to decrease effects of fatigue  7.  Encourage mobilization to extent of ability  8.  Maintain proper body alignment  9.  Provide adequate pain management to allow progressive mobilization  10. Implement pace maker precautions as needed  Outcome: Progressing     Problem: Self Care  Goal: Patient will have the ability to perform ADLs independently or with assistance (bathe, groom, dress, toilet and feed)  Description: Target End Date:  Prior to discharge or change in level of care    Document on ADL flowsheet    1.  Assess the capability and level of deficiency to perform ADLs  2.  Encourage family/care giver involvement  3.  Provide assistive devices  4.  Consider PT/OT evaluations  5.  Maintain support, give positive feedback, encourage self-care allowing extra time and verbal cuing as needed  6.  Avoid doing something for patients they can do themselves, but provide assistance as needed  7.  Assist in anticipating/planning individual needs  8.  Collaborate with Case Management and  to meet discharge needs  Outcome: Progressing       Patient is not progressing towards the following goals:
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
## 2023-11-23 ENCOUNTER — APPOINTMENT (OUTPATIENT)
Dept: RADIOLOGY | Facility: MEDICAL CENTER | Age: 53
DRG: 065 | End: 2023-11-23
Attending: STUDENT IN AN ORGANIZED HEALTH CARE EDUCATION/TRAINING PROGRAM
Payer: MEDICAID

## 2023-11-23 PROBLEM — Z86.59 HISTORY OF POSTTRAUMATIC STRESS DISORDER (PTSD): Status: ACTIVE | Noted: 2023-11-23

## 2023-11-23 PROBLEM — Z87.898 HISTORY OF SYNCOPE: Status: ACTIVE | Noted: 2023-11-23

## 2023-11-23 PROBLEM — I63.9 ACUTE CVA (CEREBROVASCULAR ACCIDENT) (HCC): Status: ACTIVE | Noted: 2023-11-23

## 2023-11-23 LAB
ALBUMIN SERPL BCP-MCNC: 3.4 G/DL (ref 3.2–4.9)
ALBUMIN/GLOB SERPL: 1.4 G/DL
ALP SERPL-CCNC: 93 U/L (ref 30–99)
ALT SERPL-CCNC: 19 U/L (ref 2–50)
ANION GAP SERPL CALC-SCNC: 10 MMOL/L (ref 7–16)
AST SERPL-CCNC: 14 U/L (ref 12–45)
BILIRUB SERPL-MCNC: 0.4 MG/DL (ref 0.1–1.5)
BUN SERPL-MCNC: 19 MG/DL (ref 8–22)
CALCIUM ALBUM COR SERPL-MCNC: 8.6 MG/DL (ref 8.5–10.5)
CALCIUM SERPL-MCNC: 8.1 MG/DL (ref 8.5–10.5)
CHLORIDE SERPL-SCNC: 105 MMOL/L (ref 96–112)
CO2 SERPL-SCNC: 25 MMOL/L (ref 20–33)
CREAT SERPL-MCNC: 1.22 MG/DL (ref 0.5–1.4)
ERYTHROCYTE [DISTWIDTH] IN BLOOD BY AUTOMATED COUNT: 48.2 FL (ref 35.9–50)
GFR SERPLBLD CREATININE-BSD FMLA CKD-EPI: 70 ML/MIN/1.73 M 2
GLOBULIN SER CALC-MCNC: 2.4 G/DL (ref 1.9–3.5)
GLUCOSE SERPL-MCNC: 98 MG/DL (ref 65–99)
HCT VFR BLD AUTO: 44.3 % (ref 42–52)
HGB BLD-MCNC: 15.1 G/DL (ref 14–18)
MCH RBC QN AUTO: 30.8 PG (ref 27–33)
MCHC RBC AUTO-ENTMCNC: 34.1 G/DL (ref 32.3–36.5)
MCV RBC AUTO: 90.4 FL (ref 81.4–97.8)
PLATELET # BLD AUTO: 189 K/UL (ref 164–446)
PMV BLD AUTO: 10.2 FL (ref 9–12.9)
POTASSIUM SERPL-SCNC: 4.5 MMOL/L (ref 3.6–5.5)
PROT SERPL-MCNC: 5.8 G/DL (ref 6–8.2)
RBC # BLD AUTO: 4.9 M/UL (ref 4.7–6.1)
SODIUM SERPL-SCNC: 140 MMOL/L (ref 135–145)
WBC # BLD AUTO: 9.9 K/UL (ref 4.8–10.8)

## 2023-11-23 PROCEDURE — 70551 MRI BRAIN STEM W/O DYE: CPT

## 2023-11-23 PROCEDURE — 700111 HCHG RX REV CODE 636 W/ 250 OVERRIDE (IP): Performed by: STUDENT IN AN ORGANIZED HEALTH CARE EDUCATION/TRAINING PROGRAM

## 2023-11-23 PROCEDURE — 85027 COMPLETE CBC AUTOMATED: CPT

## 2023-11-23 PROCEDURE — A9270 NON-COVERED ITEM OR SERVICE: HCPCS | Performed by: STUDENT IN AN ORGANIZED HEALTH CARE EDUCATION/TRAINING PROGRAM

## 2023-11-23 PROCEDURE — 80053 COMPREHEN METABOLIC PANEL: CPT

## 2023-11-23 PROCEDURE — 36415 COLL VENOUS BLD VENIPUNCTURE: CPT

## 2023-11-23 PROCEDURE — 700102 HCHG RX REV CODE 250 W/ 637 OVERRIDE(OP): Performed by: STUDENT IN AN ORGANIZED HEALTH CARE EDUCATION/TRAINING PROGRAM

## 2023-11-23 PROCEDURE — 700111 HCHG RX REV CODE 636 W/ 250 OVERRIDE (IP): Mod: JZ | Performed by: STUDENT IN AN ORGANIZED HEALTH CARE EDUCATION/TRAINING PROGRAM

## 2023-11-23 PROCEDURE — 99232 SBSQ HOSP IP/OBS MODERATE 35: CPT | Performed by: STUDENT IN AN ORGANIZED HEALTH CARE EDUCATION/TRAINING PROGRAM

## 2023-11-23 PROCEDURE — 72141 MRI NECK SPINE W/O DYE: CPT

## 2023-11-23 PROCEDURE — 99233 SBSQ HOSP IP/OBS HIGH 50: CPT | Performed by: STUDENT IN AN ORGANIZED HEALTH CARE EDUCATION/TRAINING PROGRAM

## 2023-11-23 PROCEDURE — 770020 HCHG ROOM/CARE - TELE (206)

## 2023-11-23 RX ORDER — FUROSEMIDE 40 MG/1
40 TABLET ORAL
Status: DISCONTINUED | OUTPATIENT
Start: 2023-11-23 | End: 2023-11-25 | Stop reason: HOSPADM

## 2023-11-23 RX ORDER — LISINOPRIL 20 MG/1
40 TABLET ORAL
Status: DISCONTINUED | OUTPATIENT
Start: 2023-11-23 | End: 2023-11-25 | Stop reason: HOSPADM

## 2023-11-23 RX ADMIN — MORPHINE SULFATE 4 MG: 4 INJECTION, SOLUTION INTRAMUSCULAR; INTRAVENOUS at 15:55

## 2023-11-23 RX ADMIN — OXYCODONE HYDROCHLORIDE 10 MG: 10 TABLET ORAL at 13:03

## 2023-11-23 RX ADMIN — ASPIRIN 81 MG: 81 TABLET, COATED ORAL at 05:02

## 2023-11-23 RX ADMIN — APIXABAN 5 MG: 5 TABLET, FILM COATED ORAL at 17:12

## 2023-11-23 RX ADMIN — OMEPRAZOLE 40 MG: 20 CAPSULE, DELAYED RELEASE ORAL at 05:01

## 2023-11-23 RX ADMIN — TRAZODONE HYDROCHLORIDE 500 MG: 150 TABLET ORAL at 20:41

## 2023-11-23 RX ADMIN — MEMANTINE HYDROCHLORIDE 10 MG: 10 TABLET ORAL at 05:02

## 2023-11-23 RX ADMIN — CARVEDILOL 6.25 MG: 6.25 TABLET, FILM COATED ORAL at 05:02

## 2023-11-23 RX ADMIN — ISOSORBIDE MONONITRATE 120 MG: 60 TABLET, EXTENDED RELEASE ORAL at 05:01

## 2023-11-23 RX ADMIN — OXYCODONE HYDROCHLORIDE 10 MG: 10 TABLET ORAL at 05:02

## 2023-11-23 RX ADMIN — ATORVASTATIN CALCIUM 80 MG: 80 TABLET, FILM COATED ORAL at 05:02

## 2023-11-23 RX ADMIN — LORAZEPAM 1 MG: 2 INJECTION INTRAMUSCULAR; INTRAVENOUS at 05:47

## 2023-11-23 RX ADMIN — OXYCODONE HYDROCHLORIDE 10 MG: 10 TABLET ORAL at 20:41

## 2023-11-23 RX ADMIN — CARVEDILOL 6.25 MG: 6.25 TABLET, FILM COATED ORAL at 17:12

## 2023-11-23 RX ADMIN — APIXABAN 5 MG: 5 TABLET, FILM COATED ORAL at 05:02

## 2023-11-23 RX ADMIN — LISINOPRIL 40 MG: 20 TABLET ORAL at 09:21

## 2023-11-23 RX ADMIN — FUROSEMIDE 40 MG: 40 TABLET ORAL at 09:21

## 2023-11-23 ASSESSMENT — ENCOUNTER SYMPTOMS
BACK PAIN: 1
WEAKNESS: 1
NECK PAIN: 1

## 2023-11-23 ASSESSMENT — PAIN DESCRIPTION - PAIN TYPE
TYPE: ACUTE PAIN

## 2023-11-23 NOTE — ASSESSMENT & PLAN NOTE
Hx of previous stroke with residual right-sided weakness, presented with worsening R sided weakness.   NIH scale score at [place] was reported with 7.   He was deemed not to be candidate for thrombolytic therapy and there was no large vessel occlusion to warrant thrombectomy.    MRI brain: Small 11 mm focus of acute cortical infarction involving a single gyrus at the left far posterior frontal lobe (precentral gyrus) . No hemorrhagic transformation    Continue ASA, Eliquis. (He is on triple therapy ASA, plavix and Eliquis for unclear reason. Hx of CAD with remote stent placed 2 years ago (per chart review possible around 9/2021), I have discussed with neurology. Per neurology standpoint, he needs being on ASA and Eliquis. Dc plavix  Continue statin  Echo  A1c 5, LDL 84  Neurocheck  PT/OT/SLP

## 2023-11-23 NOTE — PROGRESS NOTES
Hospital Medicine Daily Progress Note    Date of Service  11/23/2023    Chief Complaint  Divine Lugo is a 52 y.o. male admitted 11/21/2023 with right sided weakness    Hospital Course  52 y.o. right-handed male with past medical history significant for hypertension, previous stroke with residual right-sided weakness, sinus thrombosis (MRI of brain with and without contrast in March 2023 revealed Filling defect within the distal portion of the left transverse sinus, sigmoid sinus and visualized left internal jugular vein, suggestive of thrombosis), CAD with multiple stents placed last stent placed 1.5-2 years ago, hx of ACDF of cervical spine, who was transferred from [facility] 11/21 after he presented with worsening right-sided weakness. He was deemed not to be candidate for thrombolytic therapy and there was no large vessel occlusion to warrant thrombectomy.  NIH scale score at [place] was reported with 7.   Mri notes Small 11 mm focus of acute cortical infarction involving a single gyrus at the left far posterior frontal lobe (precentral gyrus).  No hemorrhagic transformation  Neurology was consulted    Interval Problem Update  Seen patient at bedside  Worsening RUE weakness  MRI brain reviewed, discussed with neurology.  Continue with aspirin and Eliquis  His home med is triple therapy ASA, plavix and eliquis. I have confirmed with patient. I have reviewed outside record. Looks like last stent was 9/2021. I have discussed with neurology. Per neurology standpoint, he needs being on ASA and Eliquis. Will dc plavix.   Reports neck pain and limited ROM. CT Cspine was done in 11/3 unremarkable. Will check MRI cspine  Echo, EEG  PT/OT    I have discussed this patient's plan of care and discharge plan at IDT rounds today with Case Management, Nursing, Nursing leadership, and other members of the IDT team.    Consultants/Specialty  neurology    Code Status  Full Code    Disposition  The patient is  not medically cleared for discharge to home or a post-acute facility.      I have placed the appropriate orders for post-discharge needs.    Review of Systems  Review of Systems   Constitutional:  Positive for malaise/fatigue.   Musculoskeletal:  Positive for back pain and neck pain.   Neurological:  Positive for weakness.   All other systems reviewed and are negative.       Physical Exam  Temp:  [36.6 °C (97.9 °F)-36.9 °C (98.4 °F)] 36.9 °C (98.4 °F)  Pulse:  [59-77] 63  Resp:  [16-18] 16  BP: (122-152)/(75-88) 140/76  SpO2:  [91 %-97 %] 95 %    Physical Exam  Vitals and nursing note reviewed.   Constitutional:       Appearance: Normal appearance. He is obese. He is ill-appearing.   HENT:      Head: Normocephalic and atraumatic.      Mouth/Throat:      Pharynx: Oropharynx is clear.   Eyes:      Pupils: Pupils are equal, round, and reactive to light.   Neck:      Vascular: No carotid bruit.   Cardiovascular:      Rate and Rhythm: Normal rate and regular rhythm.   Pulmonary:      Effort: Pulmonary effort is normal.      Breath sounds: Normal breath sounds.   Abdominal:      General: Abdomen is flat. Bowel sounds are normal.      Palpations: Abdomen is soft. There is no mass.   Musculoskeletal:         General: Swelling (cervical area) and tenderness (cervical area with limited ROM) present.      Cervical back: Neck supple.   Skin:     General: Skin is warm and dry.   Neurological:      Mental Status: He is alert and oriented to person, place, and time.      Motor: Weakness present.      Comments:  Sustain antigravity in all 4 extremities with downward drift of right upper and lower extremity.   Psychiatric:         Mood and Affect: Mood normal.         Behavior: Behavior normal.         Fluids    Intake/Output Summary (Last 24 hours) at 11/23/2023 1552  Last data filed at 11/23/2023 1512  Gross per 24 hour   Intake 0 ml   Output 1520 ml   Net -1520 ml         Laboratory  Recent Labs     11/22/23  0152 11/23/23  0104    WBC 8.8 9.9   RBC 4.88 4.90   HEMOGLOBIN 15.3 15.1   HEMATOCRIT 44.6 44.3   MCV 91.4 90.4   MCH 31.4 30.8   MCHC 34.3 34.1   RDW 48.9 48.2   PLATELETCT 203 189   MPV 10.4 10.2       Recent Labs     11/22/23  0152 11/23/23  0104   SODIUM 141 140   POTASSIUM 4.2 4.5   CHLORIDE 105 105   CO2 24 25   GLUCOSE 111* 98   BUN 14 19   CREATININE 1.25 1.22   CALCIUM 8.3* 8.1*               Recent Labs     11/22/23  0152   TRIGLYCERIDE 190*   HDL 35*   LDL 84         Imaging  MR-CERVICAL SPINE-W/O   Final Result      1.  C6-C7 anterior interbody fusion with anterior plate and screw fixation.   2.  C4-C5 moderate right paramedian disc protrusion/caudad extrusion with marked right lateral recess stenosis and overall mild central stenosis. Mild-moderate right foraminal stenosis.   3.  C5-6 minor central disc bulging. No ankur central stenosis or foraminal stenosis.   4.  No myelopathic cord signal.      MR-BRAIN-W/O   Final Result      1.  Small 11 mm focus of acute cortical infarction involving a single gyrus at the left far posterior frontal lobe (precentral gyrus). This is concordant with the given history of acute right-sided weakness. No hemorrhagic transformation.   2.  Minimal supratentorial white matter disease elsewhere in the cerebral hemispheres, primarily in the right low anterior frontal lobe. Nonspecific findings most consistent with microvascular ischemic change.      EC-ECHOCARDIOGRAM COMPLETE W/ CONT    (Results Pending)        Assessment/Plan  * Acute CVA (cerebrovascular accident) (HCC)  Assessment & Plan  MRI notes Small 11 mm focus of acute cortical infarction involving a single gyrus at the left far posterior frontal lobe (precentral gyrus).  No hemorrhagic transformation   Continue ASA, Eliquis. (He is on triple therapy ASA, plavix and Eliquis for unclear reason. Hx of CAD with remote stent placed 2 years ago (per chart review possible around 9/2021), I have discussed with neurology. Per neurology  standpoint, he needs being on ASA and Eliquis. Dc plavix  Continue statin  Echo  A1c 5, LDL 84  Neurocheck  PT/OT/SLP      History of syncope  Assessment & Plan  Reports history of syncopal events in the past.  Unclear details  Discussed with neurology will check TTE and  EEG  Telemetry    History of posttraumatic stress disorder (PTSD)  Assessment & Plan  Following psychiatry outpatient    Chronic neck pain  Assessment & Plan  Chronic neck pain with hx of ACDF of cervical spine  CT Cspine was done in 11/3 unremarkable.  Given worsening R sided weakness and limited ROM of cervical spine, will check MRI cspine    CAD (coronary artery disease)  Assessment & Plan  With hx of 5 stents placement. Per chart review, last stent was placed 2 years ago around 9/2021  Continue ASA, Eliquis. Dc plavix   Continue statin  Outpatient cardiology referral     Right sided weakness  Assessment & Plan  Hx of previous stroke with residual right-sided weakness, presented with worsening R sided weakness.   NIH scale score at [place] was reported with 7.   He was deemed not to be candidate for thrombolytic therapy and there was no large vessel occlusion to warrant thrombectomy.    MRI brain: Small 11 mm focus of acute cortical infarction involving a single gyrus at the left far posterior frontal lobe (precentral gyrus) . No hemorrhagic transformation    Continue ASA, Eliquis. (He is on triple therapy ASA, plavix and Eliquis for unclear reason. Hx of CAD with remote stent placed 2 years ago (per chart review possible around 9/2021), I have discussed with neurology. Per neurology standpoint, he needs being on ASA and Eliquis. Dc plavix  Continue statin  Echo  A1c 5, LDL 84  Neurocheck  PT/OT/SLP    Obesity- (present on admission)  Assessment & Plan  BMI 34  Life style modifications including healthy plant based diet and regular exercise recommended       HTN (hypertension)- (present on admission)  Assessment & Plan  Continue coreg  Bp  goal normotensive          VTE prophylaxis:    therapeutic anticoagulation with eliquis 5 mg BID      I have performed a physical exam and reviewed and updated ROS and Plan today (11/23/2023). In review of yesterday's note (11/22/2023), there are no changes except as documented above.    Patient is has a high medical complexity, complex decision making and is at high risk for complication, morbidity, and mortality.  I spent 52 minutes, reviewing the chart, obtaining and/or reviewing separately obtained history. Performing a medically appropriate examination and evaluation.  Counseling and educating the patient. Ordering and reviewing medications, tests, or procedures.  Discussing the case with neurology.  Documenting clinical information in EPIC. Independently interpreting results and communicating results to patient. Discussing future disposition of care with patient, RN and case management.  This does not include time spent on separately billable procedures/tests.

## 2023-11-23 NOTE — ASSESSMENT & PLAN NOTE
With hx of 5 stents placement. Per chart review, last stent was placed 2 years ago around 9/2021  Continue ASA, Eliquis. Dc plavix   Continue statin  Outpatient cardiology referral

## 2023-11-23 NOTE — CARE PLAN
The patient is Stable - Low risk of patient condition declining or worsening    Shift Goals  Clinical Goals: neuro monitoring  Patient Goals: MRI  Family Goals: JOE    Progress made toward(s) clinical / shift goals:    Problem: Neuro Status  Goal: Neuro status will remain stable or improve  Outcome: Progressing  Note: Q 4 neuros and NIH stroke scale in use. Patient A&O x 4, numbness/tingling present in the RU/LE. NIH score of 5.      Problem: Pain - Standard  Goal: Alleviation of pain or a reduction in pain to the patient’s comfort goal  Outcome: Progressing  Note: Patient medicated per MAR for severe head and shoulder pain.      Problem: Fall Risk  Goal: Patient will remain free from falls  Outcome: Progressing  Note: Fall precautions in place. Bed locked and in the lowest position. Call light in reach. Strip alarm in use.        Patient is not progressing towards the following goals: N/A

## 2023-11-23 NOTE — ASSESSMENT & PLAN NOTE
BMI 34  Life style modifications including healthy plant based diet and regular exercise recommended

## 2023-11-23 NOTE — CARE PLAN
The patient is Stable - Low risk of patient condition declining or worsening    Shift Goals  Clinical Goals: neuro monitoring  Patient Goals: safety, MRI result  Family Goals: leigh ann    Progress made toward(s) clinical / shift goals:    Problem: Knowledge Deficit - Standard  Goal: Patient and family/care givers will demonstrate understanding of plan of care, disease process/condition, diagnostic tests and medications  Outcome: Progressing     Problem: Optimal Care of the Stroke Patient  Goal: Optimal emergency care for the stroke patient  Outcome: Progressing  Goal: Optimal acute care for the stroke patient  Outcome: Progressing     Problem: Knowledge Deficit - Stroke Education  Goal: Patient's knowledge of stroke and risk factors will improve  Outcome: Progressing     Problem: Psychosocial - Patient Condition  Goal: Patient's ability to verbalize feelings about condition will improve  Outcome: Progressing  Goal: Patient's ability to re-evaluate and adapt role responsibilities will improve  Outcome: Progressing     Problem: Discharge Planning - Stroke  Goal: Ensure Stroke Core Measures are met prior to discharge  Outcome: Progressing       Patient is not progressing towards the following goals:

## 2023-11-23 NOTE — ASSESSMENT & PLAN NOTE
MRI notes Small 11 mm focus of acute cortical infarction involving a single gyrus at the left far posterior frontal lobe (precentral gyrus).  No hemorrhagic transformation   Continue ASA, Eliquis. (He is on triple therapy ASA, plavix and Eliquis for unclear reason. Hx of CAD with remote stent placed 2 years ago (per chart review possible around 9/2021), I have discussed with neurology. Per neurology standpoint, he needs being on ASA and Eliquis. Dc plavix  Continue statin  Echo unremarkable  A1c 5, LDL 84  Neurocheck  PT/OT/SLP  Stroke bridge clinic follow up

## 2023-11-23 NOTE — PROGRESS NOTES
Neurology Progress Note  Neurohospitalist Service, [facility]    Referring Physician: Karyn Abernathy M.D.      Interval History: Patient seen and examined this p.m.  Patient doing well without acute complaints although continues to have severe right-sided neck pain which she reports has been present since falling off a [employer] truck roughly 2 months prior.  Patient has not had any improvement in right-sided weakness since admission.  Patient denies headache or other new neurologic symptoms.  No reported acute overnight events.    Review of systems: In addition to what is detailed in the HPI and/or updated in the interval history, all other systems reviewed and are negative.    Past Medical History, Past Surgical History and Social History reviewed and unchanged from prior    Medications:    Current Facility-Administered Medications:     furosemide (Lasix) tablet 40 mg, 40 mg, Oral, Q DAY, Karyn Abernathy M.D., 40 mg at 11/23/23 0921    lisinopril (Prinivil) tablet 40 mg, 40 mg, Oral, Q DAY, Karyn Abernathy M.D., 40 mg at 11/23/23 0921    oxyCODONE immediate release (Roxicodone) tablet 10 mg, 10 mg, Oral, Q6HRS PRN, Karyn Abernathy M.D., 10 mg at 11/23/23 0502    oxyCODONE immediate-release (Roxicodone) tablet 5 mg, 5 mg, Oral, Q4HRS PRN, Karyn Abernathy M.D.    ondansetron (Zofran ODT) dispertab 4 mg, 4 mg, Oral, Q4HRS PRN, Karyn Abernathy M.D., 4 mg at 11/22/23 1538    omeprazole (PriLOSEC) capsule 40 mg, 40 mg, Oral, DAILY, Karyn Abernathy M.D., 40 mg at 11/23/23 0501    cyclobenzaprine (Flexeril) tablet 10 mg, 10 mg, Oral, TID PRN, TONYA Cordova, 10 mg at 11/22/23 2120    atorvastatin (Lipitor) tablet 80 mg, 80 mg, Oral, DAILY, Delvin Bingham M.D., 80 mg at 11/23/23 0502    carvedilol (Coreg) tablet 6.25 mg, 6.25 mg, Oral, BID, Delvin Bingham M.D., 6.25 mg at 11/23/23 0502    apixaban (Eliquis) tablet 5 mg, 5 mg, Oral, BID, Delvin Bingham M.D., 5 mg at 11/23/23 0502     "aspirin EC tablet 81 mg, 81 mg, Oral, DAILY, Delvin Bingham M.D., 81 mg at 11/23/23 0502    isosorbide mononitrate SR (Imdur) tablet 120 mg, 120 mg, Oral, QAM, Delvin Bingham M.D., 120 mg at 11/23/23 0501    memantine (Namenda) tablet 10 mg, 10 mg, Oral, DAILY, Delvin Bingham M.D., 10 mg at 11/23/23 0502    traZODone (Desyrel) tablet 500 mg, 500 mg, Oral, Nightly, Delvin Bingham M.D., 500 mg at 11/22/23 2013    acetaminophen (Tylenol) tablet 650 mg, 650 mg, Oral, Q6HRS PRN, Delvin Bingham M.D., 650 mg at 11/21/23 2310    morphine 4 MG/ML injection 4 mg, 4 mg, Intravenous, Q4HRS PRN, Delvin Bingham M.D., 4 mg at 11/22/23 1626    Physical Examination:   /77   Pulse 64   Temp 36.7 °C (98.1 °F) (Temporal)   Resp 16   Ht 1.727 m (5' 8\")   Wt 101 kg (223 lb 12.3 oz)   SpO2 97%   BMI 34.02 kg/m²     NEUROLOGICAL EXAM:     MENTAL STATUS: Awake, alert and oriented to person, place, time and situation, attention and memory intact  LANG/SPEECH: Naming and repetition intact, fluent speech, follows simple, two-step, multi-step and complex commands.    CRANIAL NERVES:  II: Pupils equal and reactive, no VF deficits  III, IV, VI: EOM intact, no gaze preference or deviation, no nystagmus.  V: normal sensation in V1, V2, and V3 segments bilaterally  VII: no asymmetry, no nasolabial fold flattening  VIII: normal hearing to speech  IX, X: normal palatal elevation, no uvular deviation  XI: 5/5 head turn and 5/5 shoulder shrug bilaterally  XII: midline tongue protrusion    MOTOR: Antigravity movement in bilateral upper and lower extremities. Full and symmetric power in proximal and distal muscle groups in bilateral upper and lower extremities    REFLEXES: 2/4 throughout, bilateral flexor plantar response, no clonus  SENSORY: Normal to fine touch in all extremities, no extinction to double sided stimulation (visual & tactile)  COORD: Normal finger to nose, no tremor, no dysmetria  GAIT: " Deferrred    Objective Data:    Labs:  Lab Results   Component Value Date/Time    PROTHROMBTM 12.7 01/17/2014 04:00 AM    INR 0.93 01/17/2014 04:00 AM      Lab Results   Component Value Date/Time    WBC 9.9 11/23/2023 01:04 AM    RBC 4.90 11/23/2023 01:04 AM    HEMOGLOBIN 15.1 11/23/2023 01:04 AM    HEMATOCRIT 44.3 11/23/2023 01:04 AM    MCV 90.4 11/23/2023 01:04 AM    MCH 30.8 11/23/2023 01:04 AM    MCHC 34.1 11/23/2023 01:04 AM    MPV 10.2 11/23/2023 01:04 AM    NEUTSPOLYS 64.8 11/03/2023 02:57 PM    NEUTSPOLYS 67.3 01/17/2014 04:00 AM    LYMPHOCYTES 22.8 11/03/2023 02:57 PM    LYMPHOCYTES 21.2 (L) 01/17/2014 04:00 AM    MONOCYTES 10.2 11/03/2023 02:57 PM    MONOCYTES 9.6 (H) 01/17/2014 04:00 AM    EOSINOPHILS 1.3 11/03/2023 02:57 PM    EOSINOPHILS 1.5 01/17/2014 04:00 AM    BASOPHILS 0.9 11/03/2023 02:57 PM    BASOPHILS 0.4 01/17/2014 04:00 AM    HYPOCHROMIA 1+ 01/17/2014 04:00 AM      Lab Results   Component Value Date/Time    SODIUM 140 11/23/2023 01:04 AM    POTASSIUM 4.5 11/23/2023 01:04 AM    CHLORIDE 105 11/23/2023 01:04 AM    CO2 25 11/23/2023 01:04 AM    GLUCOSE 98 11/23/2023 01:04 AM    BUN 19 11/23/2023 01:04 AM    CREATININE 1.22 11/23/2023 01:04 AM    GLOMRATE 49 (L) 11/03/2023 02:57 PM      Lab Results   Component Value Date/Time    CHOLSTRLTOT 157 11/22/2023 01:52 AM    LDL 84 11/22/2023 01:52 AM    HDL 35 (A) 11/22/2023 01:52 AM    TRIGLYCERIDE 190 (H) 11/22/2023 01:52 AM       Lab Results   Component Value Date/Time    ALKPHOSPHAT 93 11/23/2023 01:04 AM    ASTSGOT 14 11/23/2023 01:04 AM    ALTSGPT 19 11/23/2023 01:04 AM    TBILIRUBIN 0.4 11/23/2023 01:04 AM        Imaging/Testing:    I interpreted and/or reviewed the patient's neuroimaging    MR-CERVICAL SPINE-W/O   Final Result      1.  C6-C7 anterior interbody fusion with anterior plate and screw fixation.   2.  C4-C5 moderate right paramedian disc protrusion/caudad extrusion with marked right lateral recess stenosis and overall mild central  stenosis. Mild-moderate right foraminal stenosis.   3.  C5-6 minor central disc bulging. No ankur central stenosis or foraminal stenosis.   4.  No myelopathic cord signal.      MR-BRAIN-W/O   Final Result      1.  Small 11 mm focus of acute cortical infarction involving a single gyrus at the left far posterior frontal lobe (precentral gyrus). This is concordant with the given history of acute right-sided weakness. No hemorrhagic transformation.   2.  Minimal supratentorial white matter disease elsewhere in the cerebral hemispheres, primarily in the right low anterior frontal lobe. Nonspecific findings most consistent with microvascular ischemic change.        MRI 11/23/2023: Small 11 mm focus of acute cortical infarction involving a single gyrus at the left far posterior frontal lobe (precentral gyrus).  No evidence of hemorrhage.  Minimal supratentorial white matter disease in bilateral cerebral hemispheres, per my in the right low anterior frontal lobe.  Nonspecific findings most consistent with microvascular ischemic changes.    Assessment and Plan:  52-year-old male with PMH of HTN, prior stroke with residual right-sided weakness, previous sinus thrombosis (MRI of brain with and without contrast in March 2023 revealed filling defect within the distal portion of the left transverse sinus, sigmoid sinus and visualized left internal jugular vein all suggestive of thrombosis.),  who was on Eliquis and aspirin therapy and woke up on 11/21/2023 at 2 AM with worsening right-sided weakness.  Patient was transferred from [facility] to our facility for further workup.  Patient was found to have an acute left high cortical precentral gyrus acute ischemic infarction.    Patient reports on history taking this afternoon that he has had repeated episodes of loss of consciousness that have been unexplained over the past few months.  Patient is overall a poor historian but at this time we will obtain routine EEG to rule  "out obvious epileptic activity. On review of care everywhere documentation, last seen at [facility] on 11/3/2023 for a variety of complaints. Further review of outside records show a number of visits at outpatient family practice and behavioral health clinics for chronic pain and issues with anxiety and depression. Most recent documentation from 9/29 records that patient was planning on moving to [place]. There is also documentation that he left his mother alone for two hours and she wandered into another guests motel room where they were staying. She was taken to the hospital and APS was contacted. Patient was escorted out of hospital by security at this time. Unclear the status of this issue. There is briefly mentioned in some documentation that patient has a history of PNES, but this is not clarified and there was no definitive supportive documentation to help support this.   In regards to his stroke care, patient was on maximal medical therapy for stroke prevention, and in fact was likely being over treated as he was on aspirin, plavix and eliquis. In the setting of his breakthrough stroke on triple therapy, poor overall medical literacy, poor access to outpatient care I think most likely patient either was non compliant or mixed up his medications and failed to take some his his anticoagulation or antiplatelet regimen. I would be much more concerned if he were to be compliant with the above regimen for an ICH. To the best of my knowledge in the setting of patients available history, I would only recommend single anticoagulant and single antiplatelet regimen for stroke prevention. In addition to the above, it is unclear what duration of anticoagulation therapy patient requires as he reports \"I have had clots all over by body.\" Ultimately, the next most important steps for patient will be to acquire long term continuous follow up care with one provider to better align his medical care and for appropriate " risk factor reduction, both for stroke prevention and the prevention of complications from his other known cardiovascular and metabolic disease.    Problem list:  -- Acute Lt-cortical ischemic infarction    Plan:    -q4h and PRN neuro assessment. VS per nursing/unit protocol.   -BP goal is normotension, 100-130/60-80. Antihypertensives per primary team.   -Telemetry; currently SR. Screen for Afib/arrhythmia. Obtain TTE with bubble study.   -Stroke labs: LDL 84 and hemoglobin A1c 5.  -ASA 81 mg PO q day, Eliquis 5 mg twice a day and Atorvastatin 40 mg PO q HS.   -Recommend aggressive BG management per primary team. Avoid IVF with Dextrose. -BG goal .   -PT/OT/SLP eval and treat for early mobilization.  -Patient was counseled at length on admission regarding life style and risk factor modification for secondary stroke prevention including smoking cessation.  -All other medical management per primary team.   -DVT PPX: SCDs.  - Routine EEG ordered in the setting of reports of unexplained syncopal events over the past few months; no AED recommended at this time  - Neurology has no further recommendations at this time, recommend PCP and stroke bridge clinic follow up on discharge    I have performed a physical exam and reviewed and updated ROS and Plan today (11/23/2023).     Murali Crabtree III, MD  Vascular Neurology, [facility]

## 2023-11-23 NOTE — ASSESSMENT & PLAN NOTE
Chronic neck pain with hx of ACDF of cervical spine  CT Cspine was done in 11/3 unremarkable.  Given worsening R sided weakness and limited ROM of cervical spine, will check MRI cspine  I have reviewed the MRI C-spine discussed with neurosurgery on-call Dr. Garcia, recommend outpatient follow-up, ordered xray Cspine AP lateral flexion extension

## 2023-11-23 NOTE — PROGRESS NOTES
Monitor summary: SR/SB 53-75, OK 0.16, QRS 0.07, QT 0.38, with rare PAC/PVCs per strip from monitor room.

## 2023-11-23 NOTE — PROGRESS NOTES
Hospital Medicine Daily Progress Note    Date of Service  11/22/2023    Chief Complaint  Divine Lugo is a 52 y.o. male admitted 11/21/2023 with right sided weakness    Hospital Course  52 y.o. right-handed male with past medical history significant for hypertension, previous stroke with residual right-sided weakness, sinus thrombosis (MRI of brain with and without contrast in March 2023 revealed Filling defect within the distal portion of the left transverse sinus, sigmoid sinus and visualized left internal jugular vein, suggestive of thrombosis), CAD with multiple stents placed last stent placed 1.5-2 years ago, hx of ACDF of cervical spine, who was transferred from [facility]"" 11/21 after he presented with worsening right-sided weakness. He was deemed not to be candidate for thrombolytic therapy and there was no large vessel occlusion to warrant thrombectomy.  NIH scale score at [place] was reported with 7.   Neurology was consulted    Interval Problem Update  Seen patient at bedside  Worsening RUE weakness  MRI brain pending  He is currently at triple therapy ASA, plavix and eliquis. I have confirmed with patient. I have reviewed outside record. Looks like last stent was 9/2021. I have discussed with neurology. Per neurology standpoint, he needs being on ASA and Eliquis. Will dc plavix.   Reports neck pain and limited ROM. CT Cspine was done in 11/3 unremarkable. Will check MRI cspine  Echo  PT/OT    I have discussed this patient's plan of care and discharge plan at IDT rounds today with Case Management, Nursing, Nursing leadership, and other members of the IDT team.    Consultants/Specialty  neurology    Code Status  Full Code    Disposition  The patient is not medically cleared for discharge to home or a post-acute facility.      I have placed the appropriate orders for post-discharge needs.    Review of Systems  Review of Systems   Constitutional:  Positive for malaise/fatigue.    Musculoskeletal:  Positive for back pain and neck pain.   Neurological:  Positive for weakness.   All other systems reviewed and are negative.       Physical Exam  Temp:  [36.6 °C (97.9 °F)-36.7 °C (98.1 °F)] 36.7 °C (98.1 °F)  Pulse:  [50-81] 61  Resp:  [18] 18  BP: (137-164)/(72-94) 140/79  SpO2:  [91 %-98 %] 91 %    Physical Exam  Vitals and nursing note reviewed.   Constitutional:       Appearance: Normal appearance. He is obese. He is ill-appearing.   HENT:      Head: Normocephalic and atraumatic.      Mouth/Throat:      Pharynx: Oropharynx is clear.   Eyes:      Pupils: Pupils are equal, round, and reactive to light.   Neck:      Vascular: No carotid bruit.   Cardiovascular:      Rate and Rhythm: Normal rate and regular rhythm.   Pulmonary:      Effort: Pulmonary effort is normal.      Breath sounds: Normal breath sounds.   Abdominal:      General: Abdomen is flat. Bowel sounds are normal.      Palpations: Abdomen is soft. There is no mass.   Musculoskeletal:         General: Swelling (cervical area) and tenderness (cervical area with limited ROM) present.      Cervical back: Neck supple.   Skin:     General: Skin is warm and dry.   Neurological:      Mental Status: He is alert and oriented to person, place, and time.      Motor: Weakness present.      Comments:  Sustain antigravity in all 4 extremities with downward drift of right upper and lower extremity.   Psychiatric:         Mood and Affect: Mood normal.         Behavior: Behavior normal.         Fluids    Intake/Output Summary (Last 24 hours) at 11/22/2023 1658  Last data filed at 11/22/2023 1000  Gross per 24 hour   Intake 240 ml   Output 450 ml   Net -210 ml       Laboratory  Recent Labs     11/22/23  0152   WBC 8.8   RBC 4.88   HEMOGLOBIN 15.3   HEMATOCRIT 44.6   MCV 91.4   MCH 31.4   MCHC 34.3   RDW 48.9   PLATELETCT 203   MPV 10.4     Recent Labs     11/22/23  0152   SODIUM 141   POTASSIUM 4.2   CHLORIDE 105   CO2 24   GLUCOSE 111*   BUN 14    CREATININE 1.25   CALCIUM 8.3*             Recent Labs     11/22/23  0152   TRIGLYCERIDE 190*   HDL 35*   LDL 84       Imaging  MR-BRAIN-W/O    (Results Pending)   MR-CERVICAL SPINE-W/O    (Results Pending)        Assessment/Plan  * Right sided weakness  Assessment & Plan  Hx of previous stroke with residual right-sided weakness, presented with worsening R sided weakness.   NIH scale score at [place] was reported with 7.   He was deemed not to be candidate for thrombolytic therapy and there was no large vessel occlusion to warrant thrombectomy.      MRI brain  Continue ASA, Eliquis. (He is on triple therapy ASA, plavix and Eliquis for unclear reason. Hx of CAD with remote stent placed 2 years ago (per chart review possible around 9/2021), I have discussed with neurology. Per neurology standpoint, he needs being on ASA and Eliquis. Dc plavix  Continue statin  Echo  A1c 5, LDL 84  Neurocheck  PT/OT/SLP    Chronic neck pain  Assessment & Plan  Chronic neck pain with hx of ACDF of cervical spine  CT Cspine was done in 11/3 unremarkable.  Given worsening R sided weakness and limited ROM of cervical spine, will check MRI cspine    CAD (coronary artery disease)  Assessment & Plan  With hx of 5 stents placement. Per chart review, last stent was placed 2 years ago around 9/2021  Continue ASA, Eliquis. Dc plavix   Continue statin  Outpatient cardiology referral     Obesity- (present on admission)  Assessment & Plan  BMI 34  Life style modifications including healthy plant based diet and regular exercise recommended       HTN (hypertension)- (present on admission)  Assessment & Plan  Continue coreg  Bp goal normotensive          VTE prophylaxis:   SCDs/TEDs      I have performed a physical exam and reviewed and updated ROS and Plan today (11/22/2023). In review of yesterday's note (11/21/2023), there are no changes except as documented above.    Patient is has a high medical complexity, complex decision making and is at  high risk for complication, morbidity, and mortality.  I spent 51 minutes, reviewing the chart, obtaining and/or reviewing separately obtained history. Performing a medically appropriate examination and evaluation.  Counseling and educating the patient. Ordering and reviewing medications, tests, or procedures.  Discussing the case with neurology.  Documenting clinical information in EPIC. Independently interpreting results and communicating results to patient. Discussing future disposition of care with patient, RN and case management.  This does not include time spent on separately billable procedures/tests.

## 2023-11-23 NOTE — ASSESSMENT & PLAN NOTE
Reports history of syncopal events in the past.  Unclear details  Discussed with neurology will check TTE and  EEG  Telemetry

## 2023-11-24 ENCOUNTER — APPOINTMENT (OUTPATIENT)
Dept: RADIOLOGY | Facility: MEDICAL CENTER | Age: 53
DRG: 065 | End: 2023-11-24
Attending: STUDENT IN AN ORGANIZED HEALTH CARE EDUCATION/TRAINING PROGRAM
Payer: MEDICAID

## 2023-11-24 ENCOUNTER — APPOINTMENT (OUTPATIENT)
Dept: CARDIOLOGY | Facility: MEDICAL CENTER | Age: 53
DRG: 065 | End: 2023-11-24
Attending: STUDENT IN AN ORGANIZED HEALTH CARE EDUCATION/TRAINING PROGRAM
Payer: MEDICAID

## 2023-11-24 LAB
LV EJECT FRACT  99904: 55
LV EJECT FRACT MOD 2C 99903: 57.53
LV EJECT FRACT MOD 4C 99902: 59.81
LV EJECT FRACT MOD BP 99901: 59.23

## 2023-11-24 PROCEDURE — 770020 HCHG ROOM/CARE - TELE (206)

## 2023-11-24 PROCEDURE — 72050 X-RAY EXAM NECK SPINE 4/5VWS: CPT

## 2023-11-24 PROCEDURE — 93306 TTE W/DOPPLER COMPLETE: CPT

## 2023-11-24 PROCEDURE — A9270 NON-COVERED ITEM OR SERVICE: HCPCS | Performed by: STUDENT IN AN ORGANIZED HEALTH CARE EDUCATION/TRAINING PROGRAM

## 2023-11-24 PROCEDURE — 700102 HCHG RX REV CODE 250 W/ 637 OVERRIDE(OP): Performed by: STUDENT IN AN ORGANIZED HEALTH CARE EDUCATION/TRAINING PROGRAM

## 2023-11-24 PROCEDURE — 700102 HCHG RX REV CODE 250 W/ 637 OVERRIDE(OP)

## 2023-11-24 PROCEDURE — A9270 NON-COVERED ITEM OR SERVICE: HCPCS

## 2023-11-24 PROCEDURE — 700111 HCHG RX REV CODE 636 W/ 250 OVERRIDE (IP): Mod: JZ | Performed by: STUDENT IN AN ORGANIZED HEALTH CARE EDUCATION/TRAINING PROGRAM

## 2023-11-24 PROCEDURE — 99233 SBSQ HOSP IP/OBS HIGH 50: CPT | Performed by: STUDENT IN AN ORGANIZED HEALTH CARE EDUCATION/TRAINING PROGRAM

## 2023-11-24 PROCEDURE — 93306 TTE W/DOPPLER COMPLETE: CPT | Mod: 26 | Performed by: INTERNAL MEDICINE

## 2023-11-24 RX ADMIN — APIXABAN 5 MG: 5 TABLET, FILM COATED ORAL at 05:15

## 2023-11-24 RX ADMIN — TRAZODONE HYDROCHLORIDE 500 MG: 150 TABLET ORAL at 20:32

## 2023-11-24 RX ADMIN — LISINOPRIL 40 MG: 20 TABLET ORAL at 05:15

## 2023-11-24 RX ADMIN — OXYCODONE HYDROCHLORIDE 10 MG: 10 TABLET ORAL at 10:48

## 2023-11-24 RX ADMIN — CYCLOBENZAPRINE 10 MG: 10 TABLET, FILM COATED ORAL at 20:33

## 2023-11-24 RX ADMIN — ISOSORBIDE MONONITRATE 120 MG: 60 TABLET, EXTENDED RELEASE ORAL at 05:15

## 2023-11-24 RX ADMIN — MEMANTINE HYDROCHLORIDE 10 MG: 10 TABLET ORAL at 05:15

## 2023-11-24 RX ADMIN — APIXABAN 5 MG: 5 TABLET, FILM COATED ORAL at 16:51

## 2023-11-24 RX ADMIN — OXYCODONE 5 MG: 5 TABLET ORAL at 20:33

## 2023-11-24 RX ADMIN — CARVEDILOL 6.25 MG: 6.25 TABLET, FILM COATED ORAL at 05:15

## 2023-11-24 RX ADMIN — OXYCODONE HYDROCHLORIDE 10 MG: 10 TABLET ORAL at 16:51

## 2023-11-24 RX ADMIN — ASPIRIN 81 MG: 81 TABLET, COATED ORAL at 05:15

## 2023-11-24 RX ADMIN — CYCLOBENZAPRINE 10 MG: 10 TABLET, FILM COATED ORAL at 06:09

## 2023-11-24 RX ADMIN — CARVEDILOL 6.25 MG: 6.25 TABLET, FILM COATED ORAL at 16:51

## 2023-11-24 RX ADMIN — OMEPRAZOLE 40 MG: 20 CAPSULE, DELAYED RELEASE ORAL at 05:15

## 2023-11-24 RX ADMIN — ATORVASTATIN CALCIUM 80 MG: 80 TABLET, FILM COATED ORAL at 05:15

## 2023-11-24 RX ADMIN — MORPHINE SULFATE 4 MG: 4 INJECTION, SOLUTION INTRAMUSCULAR; INTRAVENOUS at 05:19

## 2023-11-24 RX ADMIN — FUROSEMIDE 40 MG: 40 TABLET ORAL at 05:15

## 2023-11-24 RX ADMIN — OXYCODONE HYDROCHLORIDE 10 MG: 10 TABLET ORAL at 02:15

## 2023-11-24 ASSESSMENT — PAIN DESCRIPTION - PAIN TYPE
TYPE: ACUTE PAIN

## 2023-11-24 ASSESSMENT — ENCOUNTER SYMPTOMS
WEAKNESS: 1
NECK PAIN: 1
BACK PAIN: 1

## 2023-11-24 ASSESSMENT — FIBROSIS 4 INDEX: FIB4 SCORE: 0.9

## 2023-11-24 NOTE — CARE PLAN
The patient is Stable - Low risk of patient condition declining or worsening    Shift Goals  Clinical Goals: Monitor neuro status  Patient Goals: Rest, pain control  Family Goals: leigh ann    Progress made toward(s) clinical / shift goals:      Problem: Neuro Status  Goal: Neuro status will remain stable or improve  Outcome: Progressing    Q4H neuro checks in place. Pt's neurological status (A/Ox4) remains unchanged throughout shift. Bed alarm is on, call light within reach.     Problem: Pain - Standard  Goal: Alleviation of pain or a reduction in pain to the patient’s comfort goal  Outcome: Progressing   Frequent pain assessments throughout shift. PRN pain medications provided per MAR and pt request. Pharmacological and non-pharmacological interventions utilized.      Patient is not progressing towards the following goals:

## 2023-11-24 NOTE — PROGRESS NOTES
Monitor Summary: SB/SR 56-73, NM .15, QRS .10, QT .41 with occasional PVC per strip from monitor room.

## 2023-11-24 NOTE — CARE PLAN
The patient is Stable - Low risk of patient condition declining or worsening    Shift Goals  Clinical Goals: stable/improved neuro status, echo  Patient Goals: pain control  Family Goals: leigh ann    Progress made toward(s) clinical / shift goals:    Problem: Knowledge Deficit - Standard  Goal: Patient and family/care givers will demonstrate understanding of plan of care, disease process/condition, diagnostic tests and medications  Outcome: Progressing     Problem: Optimal Care of the Stroke Patient  Goal: Optimal emergency care for the stroke patient  Outcome: Progressing     Problem: Knowledge Deficit - Stroke Education  Goal: Patient's knowledge of stroke and risk factors will improve  Outcome: Progressing     Problem: Psychosocial - Patient Condition  Goal: Patient's ability to verbalize feelings about condition will improve  Outcome: Progressing  Goal: Patient's ability to re-evaluate and adapt role responsibilities will improve  Outcome: Progressing     Problem: Discharge Planning - Stroke  Goal: Ensure Stroke Core Measures are met prior to discharge  Outcome: Progressing  Goal: Patient’s continuum of care needs will be met  Outcome: Progressing     Problem: Neuro Status  Goal: Neuro status will remain stable or improve  Outcome: Progressing       Patient is not progressing towards the following goals:
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
## 2023-11-25 ENCOUNTER — PHARMACY VISIT (OUTPATIENT)
Dept: PHARMACY | Facility: MEDICAL CENTER | Age: 53
End: 2023-11-25
Payer: COMMERCIAL

## 2023-11-25 VITALS
WEIGHT: 224.87 LBS | RESPIRATION RATE: 18 BRPM | OXYGEN SATURATION: 93 % | DIASTOLIC BLOOD PRESSURE: 76 MMHG | HEIGHT: 68 IN | TEMPERATURE: 97.9 F | SYSTOLIC BLOOD PRESSURE: 129 MMHG | BODY MASS INDEX: 34.08 KG/M2 | HEART RATE: 71 BPM

## 2023-11-25 PROCEDURE — 95819 EEG AWAKE AND ASLEEP: CPT | Mod: 26 | Performed by: PSYCHIATRY & NEUROLOGY

## 2023-11-25 PROCEDURE — 99239 HOSP IP/OBS DSCHRG MGMT >30: CPT | Performed by: STUDENT IN AN ORGANIZED HEALTH CARE EDUCATION/TRAINING PROGRAM

## 2023-11-25 PROCEDURE — 700102 HCHG RX REV CODE 250 W/ 637 OVERRIDE(OP)

## 2023-11-25 PROCEDURE — 4A00X4Z MEASUREMENT OF CENTRAL NERVOUS ELECTRICAL ACTIVITY, EXTERNAL APPROACH: ICD-10-PCS | Performed by: PSYCHIATRY & NEUROLOGY

## 2023-11-25 PROCEDURE — 700102 HCHG RX REV CODE 250 W/ 637 OVERRIDE(OP): Performed by: STUDENT IN AN ORGANIZED HEALTH CARE EDUCATION/TRAINING PROGRAM

## 2023-11-25 PROCEDURE — 700111 HCHG RX REV CODE 636 W/ 250 OVERRIDE (IP): Mod: JZ | Performed by: STUDENT IN AN ORGANIZED HEALTH CARE EDUCATION/TRAINING PROGRAM

## 2023-11-25 PROCEDURE — A9270 NON-COVERED ITEM OR SERVICE: HCPCS | Performed by: STUDENT IN AN ORGANIZED HEALTH CARE EDUCATION/TRAINING PROGRAM

## 2023-11-25 PROCEDURE — A9270 NON-COVERED ITEM OR SERVICE: HCPCS

## 2023-11-25 PROCEDURE — RXMED WILLOW AMBULATORY MEDICATION CHARGE: Performed by: STUDENT IN AN ORGANIZED HEALTH CARE EDUCATION/TRAINING PROGRAM

## 2023-11-25 PROCEDURE — 95819 EEG AWAKE AND ASLEEP: CPT | Performed by: PSYCHIATRY & NEUROLOGY

## 2023-11-25 RX ORDER — ISOSORBIDE MONONITRATE 120 MG/1
120 TABLET, EXTENDED RELEASE ORAL EVERY MORNING
Qty: 30 TABLET | Refills: 0 | Status: SHIPPED | OUTPATIENT
Start: 2023-11-25 | End: 2023-12-25

## 2023-11-25 RX ORDER — OMEPRAZOLE 40 MG/1
40 CAPSULE, DELAYED RELEASE ORAL DAILY
Qty: 30 CAPSULE | Refills: 0 | Status: ON HOLD | OUTPATIENT
Start: 2023-11-25 | End: 2023-12-17

## 2023-11-25 RX ORDER — EPINEPHRINE 0.3 MG/.3ML
INJECTION SUBCUTANEOUS
Qty: 2 EACH | Refills: 0 | Status: SHIPPED | OUTPATIENT
Start: 2023-11-25

## 2023-11-25 RX ORDER — ASPIRIN 81 MG/1
81 TABLET ORAL DAILY
Qty: 30 TABLET | Refills: 0 | Status: SHIPPED | OUTPATIENT
Start: 2023-11-25 | End: 2023-12-25

## 2023-11-25 RX ORDER — CYCLOBENZAPRINE HCL 10 MG
10 TABLET ORAL 3 TIMES DAILY PRN
Qty: 30 TABLET | Refills: 0 | Status: SHIPPED | OUTPATIENT
Start: 2023-11-25 | End: 2023-12-16

## 2023-11-25 RX ORDER — OXYCODONE AND ACETAMINOPHEN 10; 325 MG/1; MG/1
1 TABLET ORAL EVERY 6 HOURS PRN
Qty: 12 TABLET | Refills: 0 | Status: SHIPPED | OUTPATIENT
Start: 2023-11-25 | End: 2023-11-25

## 2023-11-25 RX ORDER — OXYCODONE HYDROCHLORIDE 10 MG/1
10 TABLET ORAL EVERY 6 HOURS PRN
Qty: 12 TABLET | Refills: 0 | Status: SHIPPED | OUTPATIENT
Start: 2023-11-25 | End: 2023-11-28

## 2023-11-25 RX ORDER — LISINOPRIL 40 MG/1
40 TABLET ORAL DAILY
Qty: 30 TABLET | Refills: 0 | Status: ON HOLD | OUTPATIENT
Start: 2023-11-26 | End: 2023-12-17

## 2023-11-25 RX ORDER — TRAZODONE HYDROCHLORIDE 100 MG/1
100 TABLET ORAL NIGHTLY
Qty: 30 TABLET | Refills: 0 | Status: ON HOLD | OUTPATIENT
Start: 2023-11-25 | End: 2023-12-17

## 2023-11-25 RX ORDER — MEMANTINE HYDROCHLORIDE 10 MG/1
10 TABLET ORAL DAILY
Qty: 30 TABLET | Refills: 0 | Status: ON HOLD | OUTPATIENT
Start: 2023-11-25 | End: 2023-12-18

## 2023-11-25 RX ORDER — CARVEDILOL 6.25 MG/1
6.25 TABLET ORAL 2 TIMES DAILY
Qty: 60 TABLET | Refills: 0 | Status: ON HOLD | OUTPATIENT
Start: 2023-11-25 | End: 2023-12-17

## 2023-11-25 RX ORDER — NITROGLYCERIN 0.4 MG/1
0.4 TABLET SUBLINGUAL
Qty: 25 TABLET | Refills: 0 | Status: SHIPPED | OUTPATIENT
Start: 2023-11-25

## 2023-11-25 RX ORDER — APIXABAN 5 MG/1
5 TABLET, FILM COATED ORAL 2 TIMES DAILY
Qty: 60 TABLET | Refills: 0 | Status: ACTIVE | OUTPATIENT
Start: 2023-11-25 | End: 2023-12-25

## 2023-11-25 RX ORDER — ONDANSETRON 4 MG/1
4 TABLET, ORALLY DISINTEGRATING ORAL EVERY 8 HOURS PRN
Qty: 12 TABLET | Refills: 0 | Status: SHIPPED | OUTPATIENT
Start: 2023-11-25

## 2023-11-25 RX ORDER — ATORVASTATIN CALCIUM 80 MG/1
80 TABLET, FILM COATED ORAL DAILY
Qty: 30 TABLET | Refills: 0 | Status: ON HOLD | OUTPATIENT
Start: 2023-11-25 | End: 2024-02-26

## 2023-11-25 RX ADMIN — OXYCODONE HYDROCHLORIDE 10 MG: 10 TABLET ORAL at 10:39

## 2023-11-25 RX ADMIN — ASPIRIN 81 MG: 81 TABLET, COATED ORAL at 04:38

## 2023-11-25 RX ADMIN — CYCLOBENZAPRINE 10 MG: 10 TABLET, FILM COATED ORAL at 04:38

## 2023-11-25 RX ADMIN — OMEPRAZOLE 40 MG: 20 CAPSULE, DELAYED RELEASE ORAL at 04:38

## 2023-11-25 RX ADMIN — ACETAMINOPHEN 650 MG: 325 TABLET, FILM COATED ORAL at 10:39

## 2023-11-25 RX ADMIN — ISOSORBIDE MONONITRATE 120 MG: 60 TABLET, EXTENDED RELEASE ORAL at 04:38

## 2023-11-25 RX ADMIN — APIXABAN 5 MG: 5 TABLET, FILM COATED ORAL at 04:38

## 2023-11-25 RX ADMIN — MEMANTINE HYDROCHLORIDE 10 MG: 10 TABLET ORAL at 04:38

## 2023-11-25 RX ADMIN — FUROSEMIDE 40 MG: 40 TABLET ORAL at 04:38

## 2023-11-25 RX ADMIN — MORPHINE SULFATE 4 MG: 4 INJECTION, SOLUTION INTRAMUSCULAR; INTRAVENOUS at 04:38

## 2023-11-25 RX ADMIN — ATORVASTATIN CALCIUM 80 MG: 80 TABLET, FILM COATED ORAL at 04:38

## 2023-11-25 RX ADMIN — CARVEDILOL 6.25 MG: 6.25 TABLET, FILM COATED ORAL at 04:38

## 2023-11-25 RX ADMIN — OXYCODONE HYDROCHLORIDE 10 MG: 10 TABLET ORAL at 00:18

## 2023-11-25 RX ADMIN — LISINOPRIL 40 MG: 20 TABLET ORAL at 04:38

## 2023-11-25 ASSESSMENT — PAIN DESCRIPTION - PAIN TYPE
TYPE: ACUTE PAIN

## 2023-11-25 NOTE — CARE PLAN
The patient is Stable - Low risk of patient condition declining or worsening    Shift Goals  Clinical Goals: Monitor neuro status, mobility  Patient Goals: Pain control  Family Goals: leigh ann    Progress made toward(s) clinical / shift goals:      Problem: Neuro Status  Goal: Neuro status will remain stable or improve  Outcome: Progressing    Q4H neuro checks in place. Pt's neurological status (A/Ox4) remains unchanged throughout shift. Bed alarm is on, call light within reach.     Problem: Pain - Standard  Goal: Alleviation of pain or a reduction in pain to the patient’s comfort goal  Outcome: Progressing   Frequent pain assessments throughout shift. PRN pain medications provided per MAR and pt request. Pharmacological and non-pharmacological interventions utilized.      Patient is not progressing towards the following goals:

## 2023-11-25 NOTE — PROGRESS NOTES
Monitor Summary: SB/SR 53-77, VT .16, QRS .08, QT .47 with rare PVC, couplets,  per strip from monitor room.

## 2023-11-25 NOTE — PROCEDURES
INPATIENT ROUTINE VIDEO ELECTROENCEPHALOGRAM REPORT    REFERRING PROVIDER: MD Jos   Patient Name:Divine Lugo   MRN:  [id number]   DOS: 11/25/2023   ROOM: [facility]/  TOTAL RECORDING TIME: 26 minutes of total recording time    INDICATION:  Divine Lugo 52 y.o. male presenting with right sided weakness and unexplained syncope. EEG ordered to rule out seizure activity.     RELEVANT TREATMENTS/MEDICATIONS:    Current Facility-Administered Medications:     furosemide    lisinopril    oxyCODONE immediate-release    oxyCODONE immediate-release    ondansetron    omeprazole    cyclobenzaprine    atorvastatin    carvedilol    apixaban    aspirin    isosorbide mononitrate SR    memantine    traZODone    acetaminophen    morphine injection     TECHNIQUE:   Routine VEEG was set up by a Neurodiagnostic technologist who performed education to the patient and staff. A minimum of 23 electrodes and 23 channel recording was setup and performed by Neurodiagnostic technologist, in accordance with the international 10-20 system. The study was reviewed in bipolar and referential montages. The recording examined the patient in the awake, drowsy and sleep.      DESCRIPTION OF THE RECORD:  The background activity when awake consisted of regular, reactive and symmetric 9-10 Hz, 20-40 uV with a posterior dominant rhythm.  Features of stage II sleep including sleep spindles were noted.      ICTAL AND INTERICTAL FINDINGS:   No focal or generalized epileptiform activity noted.   No regional slowing or persistent focal asymmetries were seen.  No definite seizures.     ACTIVATION PROCEDURES:   Hyperventilation did not show significant changes to EEG background. Photic stimulation showed a good driving response    EKG: No significant dysrhythmias from a single lead EKG tracing     EVENTS:  None    INTERPRETATION:  Normal EEG in awake, drowsy and sleep states.  There were no distinct epileptiform abnormalities, lateralizing  changes or organized electrographic seizure activity noted during this recording.    Clinical correlation is advised.     Arthur Gan MD, MHS  Department of Neurology at [facility]  Phone: [phone]  Fax: [phone]

## 2023-11-25 NOTE — DISCHARGE SUMMARY
Discharge Summary    CHIEF COMPLAINT ON ADMISSION  No chief complaint on file.      Reason for Admission  acute stroke     Admission Date  11/21/2023    CODE STATUS  Full Code    HPI & HOSPITAL COURSE  This is a 52 y.o. right-handed male with past medical history significant for hypertension, previous stroke with residual right-sided weakness, sinus thrombosis (MRI of brain with and without contrast in March 2023 revealed Filling defect within the distal portion of the left transverse sinus, sigmoid sinus and visualized left internal jugular vein, suggestive of thrombosis), CAD with multiple stents placed last stent placed 1.5-2 years ago, hx of ACDF of cervical spine, who was transferred from [facility] 11/21 after he presented with worsening right-sided weakness. He was deemed not to be candidate for thrombolytic therapy and there was no large vessel occlusion to warrant thrombectomy.  Patient was transferred to [facility] for neurology evaluation.     Here Mri notes Small 11 mm focus of acute cortical infarction involving a single gyrus at the left far posterior frontal lobe (precentral gyrus).  No hemorrhagic transformation.  Neurology recommended  aspirin 81 mg and Eliquis 5 mg twice a day and atorvastatin 40 mg daily.  Blood pressure goal is normotensive.  Echo unremarkable. Outpatient stroke [facility] has been referred.     Of note, the patient has been on triple therapy at home including aspirin, Plavix and Eliquis for unknown indications.  Per patient and per my chart review from outside record, patient's last cardiac stent was placed 1.5 to 2 years ago.  I have discussed with neurology, who recommended to continue aspirin and Eliquis.  So I have discontinued Plavix given very high bleeding risk on triple therapy.     Patient reports chronic neck pain with history of spine fusion in the past.  MRI cervical spine was obtained which showed C4-C5 moderate right paramedian disc  protrusion/caudad extrusion with marked right lateral recess stenosis and overall mild central stenosis. Mild-moderate right foraminal stenosis. C5-6 minor central disc bulging. No ankur central stenosis or foraminal stenosis. There is no myelopathic cord signal.  I have discussed this with neurosurgery, who recommend outpatient follow-up.  No inpatient intervention indicated.  Neurosurgery outpatient referral placed.     Patient reports history of unexplained syncopal events over the past few months.  EEG unremarkable.  Echo unremarkable.     Regarding his CAD.  Patient has history of 5 stents placed in the past.  Cardiology referral has been placed.  He will continue with aspirin, Eliquis, statin and other cardiac medications.     PT/OT recommend home health care, however given his insurance, unable to arrange home health care.  I have provided the patient a prescription for outpatient PT/OT.     Patient states he has no PCP and he has run out home medications.   has set up PCP follow-up for him after discharge. All his home medications were refilled and delivered to bedside prior discharge.     Therefore, he is discharged in good and stable condition to home with close outpatient follow-up.    The patient met 2-midnight criteria for an inpatient stay at the time of discharge.    Discharge Date  11/25/23    FOLLOW UP ITEMS POST DISCHARGE  Follow-up with PCP, stroke [facility], cardiology, neurosurgery as outpatient    DISCHARGE DIAGNOSES  Principal Problem:    Acute CVA (cerebrovascular accident) (HCC) (POA: Unknown)  Active Problems:    HTN (hypertension) (Chronic) (POA: Yes)    Obesity (POA: Yes)    Right sided weakness (POA: Unknown)    CAD (coronary artery disease) (POA: Unknown)    Chronic neck pain (POA: Unknown)    History of posttraumatic stress disorder (PTSD) (POA: Unknown)    History of syncope (POA: Unknown)  Resolved Problems:    * No resolved hospital problems. *      FOLLOW UP  No  future appointments.  [address] [address]  [phone]  Follow up  Walk-ins appointments available on Monday and Tuesdat 7:45am-3:30pm and Wednesday-Saturday 6:45am-4:30pm.      MEDICATIONS ON DISCHARGE     Medication List        START taking these medications        Instructions   cyclobenzaprine 10 mg Tabs  Commonly known as: Flexeril   Take 1 Tablet by mouth 3 times a day as needed for Muscle Spasms for up to 21 days.  Dose: 10 mg     lisinopril 40 MG tablet  Start taking on: November 26, 2023  Commonly known as: Prinivil   Take 1 Tablet by mouth every day.  Dose: 40 mg            CHANGE how you take these medications        Instructions   memantine 10 MG Tabs  What changed: when to take this  Commonly known as: Namenda   Take 1 Tablet by mouth every day for 30 days.  Dose: 10 mg     nitroGLYCERIN 0.3 MG SL tablet  What changed: reasons to take this  Commonly known as: Nitrostat   Place 1 Tablet under the tongue as needed for Chest Pain for up to 30 days. DISSOLVE ONE TABLET UNDER THE TONGUE EVERY 5 MINUTES AS NEEDED FOR CHEST PAIN DO NOT EXCEED A TOTAL OF 3 DOSES IN 15 MINUTES  Dose: 0.3 mg     oxyCODONE-acetaminophen  MG Tabs  What changed: how much to take  Commonly known as: Percocet   Take 1 Tablet by mouth every 6 hours as needed for Severe Pain for up to 3 days.  Dose: 1 Tablet     traZODone 100 MG Tabs  What changed: how much to take  Commonly known as: Desyrel   Take 1 Tablet by mouth every evening for 30 days.  Dose: 100 mg            CONTINUE taking these medications        Instructions   aspirin 81 MG EC tablet   Take 1 Tablet by mouth every day for 30 days.  (Take 1 Tablet by mouth every day for 30 days.)  Dose: 81 mg     atorvastatin 80 MG tablet  Commonly known as: Lipitor   Take 1 Tablet by mouth every day.  Dose: 80 mg     carvedilol 6.25 MG Tabs  Commonly known as: Coreg   Take 1 Tablet by mouth 2 times a day for 30 days.  Dose: 6.25 mg      Eliquis 5mg Tabs  Generic drug: apixaban   Take 1 Tablet by mouth 2 times a day for 30 days.  Dose: 5 mg     EPINEPHrine 0.3 MG/0.3ML Soaj solution for injection  Commonly known as: Epipen   INJECT CONTENTS OF 1 PEN SUBCUTANEOUSLY AS NEEDED FOR ALLERGIC REACTION MAY REPEAT DOSE AFTER 5 TO 15 MINTUTES     isosorbide mononitrate 120 MG CR tablet  Commonly known as: Imdur   Take 1 Tablet by mouth every morning for 30 days.  Dose: 120 mg     omeprazole 40 MG delayed-release capsule  Commonly known as: PriLOSEC   Take 1 Capsule by mouth every day.  Dose: 40 mg     ondansetron 4 MG Tbdp  Commonly known as: Zofran ODT   Take 1 Tablet by mouth every 8 hours as needed for Nausea/Vomiting.  Dose: 4 mg            STOP taking these medications      amoxicillin-clavulanate 875-125 MG Tabs  Commonly known as: Augmentin     baclofen 20 MG tablet  Commonly known as: Lioresal     clopidogrel 75 MG Tabs  Commonly known as: Plavix     diazePAM 5 MG Tabs  Commonly known as: Valium     diphenhydrAMINE 50 MG/ML Soln  Commonly known as: Benadryl     fluticasone 50 MCG/ACT nasal spray  Commonly known as: Flonase     furosemide 40 MG Tabs  Commonly known as: Lasix     ketorolac 30 MG/ML Soln  Commonly known as: Toradol     LISINOPRIL     morphine SR 15 MG Tb12  Commonly known as: MS CONTIN     potassium chloride SA 20 MEQ Tbcr  Commonly known as: Kdur     prazosin 2 MG Caps  Commonly known as: Minipress     ZOLOFT PO              Allergies  Allergies   Allergen Reactions    Gabapentin Unspecified     Causes patient to seizures    Bloodless      Pt states he would accept blood in life or death situation    Nkda [No Known Drug Allergy]        DIET  Orders Placed This Encounter   Procedures    Diet Order Diet: Cardiac     Standing Status:   Standing     Number of Occurrences:   1     Order Specific Question:   Diet:     Answer:   Cardiac [6]       ACTIVITY  As tolerated.  Weight bearing as  tolerated    CONSULTATIONS  neurology    PROCEDURES  na    LABORATORY  Lab Results   Component Value Date    SODIUM 140 11/23/2023    POTASSIUM 4.5 11/23/2023    CHLORIDE 105 11/23/2023    CO2 25 11/23/2023    GLUCOSE 98 11/23/2023    BUN 19 11/23/2023    CREATININE 1.22 11/23/2023    GLOMRATE 49 (L) 11/03/2023        Lab Results   Component Value Date    WBC 9.9 11/23/2023    HEMOGLOBIN 15.1 11/23/2023    HEMATOCRIT 44.3 11/23/2023    PLATELETCT 189 11/23/2023      DX-CERVICAL SPINE-4+ VIEWS   Final Result      1.  C6-C7 ACDF.   2.  Mild grade 1 degenerative anterolisthesis C5 on C6 without significant hypermobility.   3.  No acute abnormality.      EC-ECHOCARDIOGRAM COMPLETE W/ CONT   Final Result      MR-CERVICAL SPINE-W/O   Final Result      1.  C6-C7 anterior interbody fusion with anterior plate and screw fixation.   2.  C4-C5 moderate right paramedian disc protrusion/caudad extrusion with marked right lateral recess stenosis and overall mild central stenosis. Mild-moderate right foraminal stenosis.   3.  C5-6 minor central disc bulging. No ankur central stenosis or foraminal stenosis.   4.  No myelopathic cord signal.      MR-BRAIN-W/O   Final Result      1.  Small 11 mm focus of acute cortical infarction involving a single gyrus at the left far posterior frontal lobe (precentral gyrus). This is concordant with the given history of acute right-sided weakness. No hemorrhagic transformation.   2.  Minimal supratentorial white matter disease elsewhere in the cerebral hemispheres, primarily in the right low anterior frontal lobe. Nonspecific findings most consistent with microvascular ischemic change.          Total time of the discharge process 36 minutes.

## 2023-11-25 NOTE — DISCHARGE INSTRUCTIONS
Discharge Instructions per Karyn Abernathy M.D.    Please follow-up with PCP as outpatient.  Take medications as prescribed  Stop Plavix. Continue ASA and Eliquis and other medications prescribed to you  You need to follow up with cardiology, neurology, neurosurgery and urology as outpatient.     Return to ER in the event of new or worsening symptoms. Please note importance of compliance and the patient has agreed to proceed with all medical recommendations and follow up plan indicated above. All medications come with benefits and risks. Risks may include permanent injury or death and these risks can be minimized with close reassessment and monitoring. Please make it to your scheduled follow ups with PCP, cardiology, stroke bridge clinic, neurosurgery and urology

## 2023-11-25 NOTE — PROGRESS NOTES
Hospital Medicine Daily Progress Note    Date of Service  11/24/2023    Chief Complaint  Divine Lugo is a 52 y.o. male admitted 11/21/2023 with right sided weakness    Hospital Course  52 y.o. right-handed male with past medical history significant for hypertension, previous stroke with residual right-sided weakness, sinus thrombosis (MRI of brain with and without contrast in March 2023 revealed Filling defect within the distal portion of the left transverse sinus, sigmoid sinus and visualized left internal jugular vein, suggestive of thrombosis), CAD with multiple stents placed last stent placed 1.5-2 years ago, hx of ACDF of cervical spine, who was transferred from [facility] 11/21 after he presented with worsening right-sided weakness. He was deemed not to be candidate for thrombolytic therapy and there was no large vessel occlusion to warrant thrombectomy.  NIH scale score at [place] was reported with 7.   Mri notes Small 11 mm focus of acute cortical infarction involving a single gyrus at the left far posterior frontal lobe (precentral gyrus).  No hemorrhagic transformation  Neurology was consulted    Interval Problem Update  Seen patient at bedside  No acute overnight events  MRI brain reviewed, discussed with neurology.  Continue with aspirin and Eliquis  Echo reviewed normal left ventricular systolic function  EEG pending  I have reviewed the MRI C-spine discussed with neurosurgery on-call Dr. Garcia, recommend outpatient follow-up, ordered xray Cspine AP lateral flexion extension  Patient needs outpatient PCP, cardiology, neurosurgery and neurology follow-up  [facility]    I have discussed this patient's plan of care and discharge plan at IDT rounds today with Case Management, Nursing, Nursing leadership, and other members of the IDT team.    Consultants/Specialty  neurology    Code Status  Full Code    Disposition  Medically Cleared  I have placed the appropriate orders for post-discharge  needs.    Review of Systems  Review of Systems   Constitutional:  Positive for malaise/fatigue.   Musculoskeletal:  Positive for back pain and neck pain.   Neurological:  Positive for weakness.   All other systems reviewed and are negative.       Physical Exam  Temp:  [36.7 °C (98.1 °F)-36.8 °C (98.3 °F)] 36.8 °C (98.2 °F)  Pulse:  [57-68] 61  Resp:  [15-18] 18  BP: (131-160)/(73-76) 144/76  SpO2:  [93 %-95 %] 93 %    Physical Exam  Vitals and nursing note reviewed.   Constitutional:       Appearance: Normal appearance. He is obese. He is ill-appearing.   HENT:      Head: Normocephalic and atraumatic.      Mouth/Throat:      Pharynx: Oropharynx is clear.   Eyes:      Pupils: Pupils are equal, round, and reactive to light.   Neck:      Vascular: No carotid bruit.   Cardiovascular:      Rate and Rhythm: Normal rate and regular rhythm.   Pulmonary:      Effort: Pulmonary effort is normal.      Breath sounds: Normal breath sounds.   Abdominal:      General: Abdomen is flat. Bowel sounds are normal.      Palpations: Abdomen is soft. There is no mass.   Musculoskeletal:         General: Swelling (cervical area) and tenderness (cervical area with limited ROM) present.      Cervical back: Neck supple.   Skin:     General: Skin is warm and dry.   Neurological:      Mental Status: He is alert and oriented to person, place, and time.      Motor: Weakness present.      Comments:  Sustain antigravity in all 4 extremities with downward drift of right upper and lower extremity.   Psychiatric:         Mood and Affect: Mood normal.         Behavior: Behavior normal.         Fluids    Intake/Output Summary (Last 24 hours) at 11/24/2023 1621  Last data filed at 11/24/2023 0300  Gross per 24 hour   Intake --   Output 1125 ml   Net -1125 ml         Laboratory  Recent Labs     11/22/23  0152 11/23/23  0104   WBC 8.8 9.9   RBC 4.88 4.90   HEMOGLOBIN 15.3 15.1   HEMATOCRIT 44.6 44.3   MCV 91.4 90.4   MCH 31.4 30.8   MCHC 34.3 34.1   RDW  48.9 48.2   PLATELETCT 203 189   MPV 10.4 10.2       Recent Labs     11/22/23  0152 11/23/23  0104   SODIUM 141 140   POTASSIUM 4.2 4.5   CHLORIDE 105 105   CO2 24 25   GLUCOSE 111* 98   BUN 14 19   CREATININE 1.25 1.22   CALCIUM 8.3* 8.1*               Recent Labs     11/22/23  0152   TRIGLYCERIDE 190*   HDL 35*   LDL 84         Imaging  DX-CERVICAL SPINE-4+ VIEWS   Final Result      1.  C6-C7 ACDF.   2.  Mild grade 1 degenerative anterolisthesis C5 on C6 without significant hypermobility.   3.  No acute abnormality.      EC-ECHOCARDIOGRAM COMPLETE W/ CONT   Final Result      MR-CERVICAL SPINE-W/O   Final Result      1.  C6-C7 anterior interbody fusion with anterior plate and screw fixation.   2.  C4-C5 moderate right paramedian disc protrusion/caudad extrusion with marked right lateral recess stenosis and overall mild central stenosis. Mild-moderate right foraminal stenosis.   3.  C5-6 minor central disc bulging. No ankur central stenosis or foraminal stenosis.   4.  No myelopathic cord signal.      MR-BRAIN-W/O   Final Result      1.  Small 11 mm focus of acute cortical infarction involving a single gyrus at the left far posterior frontal lobe (precentral gyrus). This is concordant with the given history of acute right-sided weakness. No hemorrhagic transformation.   2.  Minimal supratentorial white matter disease elsewhere in the cerebral hemispheres, primarily in the right low anterior frontal lobe. Nonspecific findings most consistent with microvascular ischemic change.           Assessment/Plan  * Acute CVA (cerebrovascular accident) (HCC)  Assessment & Plan  MRI notes Small 11 mm focus of acute cortical infarction involving a single gyrus at the left far posterior frontal lobe (precentral gyrus).  No hemorrhagic transformation   Continue ASA, Eliquis. (He is on triple therapy ASA, plavix and Eliquis for unclear reason. Hx of CAD with remote stent placed 2 years ago (per chart review possible around 9/2021),  I have discussed with neurology. Per neurology standpoint, he needs being on ASA and Eliquis. Dc plavix  Continue statin  Echo unremarkable  A1c 5, LDL 84  Neurocheck  PT/OT/SLP  Stroke bridge clinic follow up      History of syncope  Assessment & Plan  Reports history of syncopal events in the past.  Unclear details  Discussed with neurology will check TTE and  EEG  Telemetry    History of posttraumatic stress disorder (PTSD)  Assessment & Plan  Following psychiatry outpatient    Chronic neck pain  Assessment & Plan  Chronic neck pain with hx of ACDF of cervical spine  CT Cspine was done in 11/3 unremarkable.  Given worsening R sided weakness and limited ROM of cervical spine, will check MRI cspine  I have reviewed the MRI C-spine discussed with neurosurgery on-call Dr. Garcia, recommend outpatient follow-up, ordered xray Cspine AP lateral flexion extension    CAD (coronary artery disease)  Assessment & Plan  With hx of 5 stents placement. Per chart review, last stent was placed 2 years ago around 9/2021  Continue ASA, Eliquis. Dc plavix   Continue statin  Outpatient cardiology referral     Right sided weakness  Assessment & Plan  Hx of previous stroke with residual right-sided weakness, presented with worsening R sided weakness.   NIH scale score at [place] was reported with 7.   He was deemed not to be candidate for thrombolytic therapy and there was no large vessel occlusion to warrant thrombectomy.    MRI brain: Small 11 mm focus of acute cortical infarction involving a single gyrus at the left far posterior frontal lobe (precentral gyrus) . No hemorrhagic transformation    Continue ASA, Eliquis. (He is on triple therapy ASA, plavix and Eliquis for unclear reason. Hx of CAD with remote stent placed 2 years ago (per chart review possible around 9/2021), I have discussed with neurology. Per neurology standpoint, he needs being on ASA and Eliquis. Dc plavix  Continue statin  Echo  A1c 5, LDL  84  Neurocheck  PT/OT/SLP    Obesity- (present on admission)  Assessment & Plan  BMI 34  Life style modifications including healthy plant based diet and regular exercise recommended       HTN (hypertension)- (present on admission)  Assessment & Plan  Continue coreg  Bp goal normotensive          VTE prophylaxis:    therapeutic anticoagulation with eliquis 5 mg BID      I have performed a physical exam and reviewed and updated ROS and Plan today (11/24/2023). In review of yesterday's note (11/23/2023), there are no changes except as documented above.    Patient is has a high medical complexity, complex decision making and is at high risk for complication, morbidity, and mortality.  I spent 51 minutes, reviewing the chart, obtaining and/or reviewing separately obtained history. Performing a medically appropriate examination and evaluation.  Counseling and educating the patient. Ordering and reviewing medications, tests, or procedures.  Discussing the case with neurology.  Documenting clinical information in EPIC. Independently interpreting results and communicating results to patient. Discussing future disposition of care with patient, RN and case management.  This does not include time spent on separately billable procedures/tests.

## 2023-12-07 ENCOUNTER — TELEPHONE (OUTPATIENT)
Dept: HEALTH INFORMATION MANAGEMENT | Facility: OTHER | Age: 53
End: 2023-12-07
Payer: MEDICAID

## 2023-12-08 ENCOUNTER — HOSPITAL ENCOUNTER (OUTPATIENT)
Facility: MEDICAL CENTER | Age: 53
End: 2023-12-08
Attending: NURSE PRACTITIONER
Payer: MEDICAID

## 2023-12-08 ENCOUNTER — OFFICE VISIT (OUTPATIENT)
Dept: MEDICAL GROUP | Facility: MEDICAL CENTER | Age: 53
End: 2023-12-08
Attending: STUDENT IN AN ORGANIZED HEALTH CARE EDUCATION/TRAINING PROGRAM
Payer: MEDICAID

## 2023-12-08 VITALS
SYSTOLIC BLOOD PRESSURE: 182 MMHG | TEMPERATURE: 97.7 F | WEIGHT: 227.3 LBS | RESPIRATION RATE: 17 BRPM | HEIGHT: 68 IN | DIASTOLIC BLOOD PRESSURE: 92 MMHG | OXYGEN SATURATION: 94 % | BODY MASS INDEX: 34.45 KG/M2 | HEART RATE: 85 BPM

## 2023-12-08 DIAGNOSIS — Z76.89 ENCOUNTER TO ESTABLISH CARE: ICD-10-CM

## 2023-12-08 DIAGNOSIS — Z87.898 HISTORY OF SYNCOPE: ICD-10-CM

## 2023-12-08 DIAGNOSIS — I63.9 ACUTE CVA (CEREBROVASCULAR ACCIDENT) (HCC): ICD-10-CM

## 2023-12-08 DIAGNOSIS — G89.29 CHRONIC NECK PAIN: ICD-10-CM

## 2023-12-08 DIAGNOSIS — M54.2 CHRONIC NECK PAIN: ICD-10-CM

## 2023-12-08 DIAGNOSIS — H91.93 BILATERAL HEARING LOSS, UNSPECIFIED HEARING LOSS TYPE: ICD-10-CM

## 2023-12-08 DIAGNOSIS — Z23 NEED FOR VACCINATION: ICD-10-CM

## 2023-12-08 LAB — AMBIGUOUS DTTM AMBI4: NORMAL

## 2023-12-08 PROCEDURE — 90471 IMMUNIZATION ADMIN: CPT

## 2023-12-08 PROCEDURE — 99204 OFFICE O/P NEW MOD 45 MIN: CPT | Performed by: NURSE PRACTITIONER

## 2023-12-08 PROCEDURE — 99213 OFFICE O/P EST LOW 20 MIN: CPT | Performed by: NURSE PRACTITIONER

## 2023-12-08 PROCEDURE — 3080F DIAST BP >= 90 MM HG: CPT | Performed by: NURSE PRACTITIONER

## 2023-12-08 PROCEDURE — 80307 DRUG TEST PRSMV CHEM ANLYZR: CPT

## 2023-12-08 PROCEDURE — G0480 DRUG TEST DEF 1-7 CLASSES: HCPCS

## 2023-12-08 PROCEDURE — 3077F SYST BP >= 140 MM HG: CPT | Performed by: NURSE PRACTITIONER

## 2023-12-08 ASSESSMENT — FIBROSIS 4 INDEX: FIB4 SCORE: 0.9

## 2023-12-12 ENCOUNTER — TELEPHONE (OUTPATIENT)
Dept: MEDICAL GROUP | Facility: MEDICAL CENTER | Age: 53
End: 2023-12-12
Payer: MEDICAID

## 2023-12-12 LAB
AMPHET CTO UR CFM-MCNC: NORMAL NG/ML
BARBITURATES CTO UR CFM-MCNC: NEGATIVE NG/ML
BENZODIAZ CTO UR CFM-MCNC: NEGATIVE NG/ML
BUPRENORPHINE UR-MCNC: NEGATIVE NG/ML
CANNABINOIDS CTO UR CFM-MCNC: NORMAL NG/ML
CARISOPRODOL UR-MCNC: NEGATIVE NG/ML
COCAINE CTO UR CFM-MCNC: NEGATIVE NG/ML
CREAT UR-MCNC: 121.2 MG/DL (ref 20–400)
DRUG SCREEN COMMENT UR-IMP: NORMAL
ETHYL GLUCURONIDE UR QL SCN: NEGATIVE NG/ML
FENTANYL UR-MCNC: NEGATIVE NG/ML
MEPERIDINE CTO UR SCN-MCNC: NEGATIVE NG/ML
METHADONE CTO UR CFM-MCNC: NEGATIVE NG/ML
OPIATES UR QL SCN: NEGATIVE NG/ML
OXYCDOXYM URSCRN 2005102: NEGATIVE NG/ML
PCP CTO UR CFM-MCNC: NEGATIVE NG/ML
PROPOXYPH CTO UR CFM-MCNC: NEGATIVE NG/ML
TAPENTADOL UR-MCNC: NEGATIVE NG/ML
TRAMADOL CTO UR SCN-MCNC: NORMAL NG/ML
ZOLPIDEM UR-MCNC: NEGATIVE NG/ML

## 2023-12-12 NOTE — TELEPHONE ENCOUNTER
Phone Number Called: [phone] (home)       Call outcome: Spoke to patient regarding message below.    Message: On 12/12/23 patient got upset due to I had to r/s him for a different day due to sho had a emergency to leave home,patient did not understood the reason and got more upset and told me we do not care about his health.

## 2023-12-14 LAB
AMPHET UR CFM-MCNC: <50 NG/ML
MDA UR CFM-MCNC: <200 NG/ML
MDEA UR CFM-MCNC: <200 NG/ML
MDMA UR CFM-MCNC: <200 NG/ML
METHAMPHET UR CFM-MCNC: <200 NG/ML
NORTRAMADOL UR-MCNC: 4536 NG/ML
PHENTERMINE UR CFM-MCNC: <200 NG/ML
TRAMADOL UR-MCNC: 3857 NG/ML

## 2023-12-15 LAB — THC UR CFM-MCNC: 102 NG/ML

## 2023-12-17 ENCOUNTER — HOSPITAL ENCOUNTER (INPATIENT)
Facility: MEDICAL CENTER | Age: 53
LOS: 2 days | DRG: 103 | End: 2023-12-20
Attending: INTERNAL MEDICINE | Admitting: STUDENT IN AN ORGANIZED HEALTH CARE EDUCATION/TRAINING PROGRAM
Payer: MEDICAID

## 2023-12-17 DIAGNOSIS — I25.112 CORONARY ARTERY DISEASE INVOLVING NATIVE CORONARY ARTERY OF NATIVE HEART WITH REFRACTORY ANGINA PECTORIS (HCC): ICD-10-CM

## 2023-12-17 DIAGNOSIS — M54.2 CHRONIC NECK PAIN: ICD-10-CM

## 2023-12-17 DIAGNOSIS — M54.12 CERVICAL NEURALGIA: ICD-10-CM

## 2023-12-17 DIAGNOSIS — G89.29 CHRONIC NECK PAIN: ICD-10-CM

## 2023-12-17 PROBLEM — K04.7 DENTAL INFECTION: Status: ACTIVE | Noted: 2023-12-17

## 2023-12-17 PROBLEM — R51.9 HEADACHE: Status: ACTIVE | Noted: 2023-12-17

## 2023-12-17 PROBLEM — R79.89 ELEVATED TROPONIN: Status: ACTIVE | Noted: 2023-12-17

## 2023-12-17 LAB
CK SERPL-CCNC: 60 U/L (ref 0–154)
NT-PROBNP SERPL IA-MCNC: 925 PG/ML (ref 0–125)
TROPONIN T SERPL-MCNC: 62 NG/L (ref 6–19)

## 2023-12-17 PROCEDURE — 87040 BLOOD CULTURE FOR BACTERIA: CPT | Mod: 91

## 2023-12-17 PROCEDURE — 99223 1ST HOSP IP/OBS HIGH 75: CPT | Mod: AI | Performed by: INTERNAL MEDICINE

## 2023-12-17 PROCEDURE — 96375 TX/PRO/DX INJ NEW DRUG ADDON: CPT

## 2023-12-17 PROCEDURE — 96374 THER/PROPH/DIAG INJ IV PUSH: CPT

## 2023-12-17 PROCEDURE — 700102 HCHG RX REV CODE 250 W/ 637 OVERRIDE(OP): Mod: UD | Performed by: INTERNAL MEDICINE

## 2023-12-17 PROCEDURE — 84484 ASSAY OF TROPONIN QUANT: CPT

## 2023-12-17 PROCEDURE — 82550 ASSAY OF CK (CPK): CPT

## 2023-12-17 PROCEDURE — 700111 HCHG RX REV CODE 636 W/ 250 OVERRIDE (IP): Mod: UD | Performed by: INTERNAL MEDICINE

## 2023-12-17 PROCEDURE — G0378 HOSPITAL OBSERVATION PER HR: HCPCS

## 2023-12-17 PROCEDURE — 36415 COLL VENOUS BLD VENIPUNCTURE: CPT

## 2023-12-17 PROCEDURE — 93005 ELECTROCARDIOGRAM TRACING: CPT | Performed by: INTERNAL MEDICINE

## 2023-12-17 PROCEDURE — 83880 ASSAY OF NATRIURETIC PEPTIDE: CPT

## 2023-12-17 PROCEDURE — A9270 NON-COVERED ITEM OR SERVICE: HCPCS | Mod: UD | Performed by: INTERNAL MEDICINE

## 2023-12-17 RX ORDER — IRBESARTAN 150 MG/1
150 TABLET ORAL NIGHTLY
Status: ON HOLD | COMMUNITY
Start: 2023-09-13 | End: 2023-12-18

## 2023-12-17 RX ORDER — NITROGLYCERIN 0.4 MG/1
0.4 TABLET SUBLINGUAL
Status: DISCONTINUED | OUTPATIENT
Start: 2023-12-17 | End: 2023-12-20 | Stop reason: HOSPADM

## 2023-12-17 RX ORDER — OMEPRAZOLE 40 MG/1
40 CAPSULE, DELAYED RELEASE ORAL DAILY
Status: DISCONTINUED | OUTPATIENT
Start: 2023-12-18 | End: 2023-12-17

## 2023-12-17 RX ORDER — OMEPRAZOLE 40 MG/1
40 CAPSULE, DELAYED RELEASE ORAL DAILY
COMMUNITY

## 2023-12-17 RX ORDER — BUSPIRONE HYDROCHLORIDE 5 MG/1
7.5 TABLET ORAL 2 TIMES DAILY
Status: DISCONTINUED | OUTPATIENT
Start: 2023-12-17 | End: 2023-12-20 | Stop reason: HOSPADM

## 2023-12-17 RX ORDER — CARVEDILOL 6.25 MG/1
6.25 TABLET ORAL 2 TIMES DAILY
Status: ON HOLD | COMMUNITY
End: 2024-02-26

## 2023-12-17 RX ORDER — DIAZEPAM 5 MG/ML
5 INJECTION, SOLUTION INTRAMUSCULAR; INTRAVENOUS
Status: COMPLETED | OUTPATIENT
Start: 2023-12-17 | End: 2023-12-19

## 2023-12-17 RX ORDER — OXYCODONE HYDROCHLORIDE 5 MG/1
5 TABLET ORAL
Status: DISCONTINUED | OUTPATIENT
Start: 2023-12-17 | End: 2023-12-18

## 2023-12-17 RX ORDER — PROMETHAZINE HYDROCHLORIDE 25 MG/1
12.5-25 SUPPOSITORY RECTAL EVERY 4 HOURS PRN
Status: DISCONTINUED | OUTPATIENT
Start: 2023-12-17 | End: 2023-12-20 | Stop reason: HOSPADM

## 2023-12-17 RX ORDER — AMOXICILLIN AND CLAVULANATE POTASSIUM 875; 125 MG/1; MG/1
1 TABLET, FILM COATED ORAL EVERY 12 HOURS
Status: DISCONTINUED | OUTPATIENT
Start: 2023-12-17 | End: 2023-12-20

## 2023-12-17 RX ORDER — LOSARTAN POTASSIUM 50 MG/1
50 TABLET ORAL DAILY
Status: ON HOLD | COMMUNITY
Start: 2023-11-20 | End: 2023-12-18

## 2023-12-17 RX ORDER — ACETAMINOPHEN 325 MG/1
650 TABLET ORAL EVERY 6 HOURS PRN
Status: DISCONTINUED | OUTPATIENT
Start: 2023-12-17 | End: 2023-12-17

## 2023-12-17 RX ORDER — ATORVASTATIN CALCIUM 80 MG/1
80 TABLET, FILM COATED ORAL DAILY
Status: DISCONTINUED | OUTPATIENT
Start: 2023-12-18 | End: 2023-12-20 | Stop reason: HOSPADM

## 2023-12-17 RX ORDER — LABETALOL HYDROCHLORIDE 5 MG/ML
10 INJECTION, SOLUTION INTRAVENOUS EVERY 4 HOURS PRN
Status: DISCONTINUED | OUTPATIENT
Start: 2023-12-17 | End: 2023-12-18

## 2023-12-17 RX ORDER — LISINOPRIL 20 MG/1
40 TABLET ORAL DAILY
Status: DISCONTINUED | OUTPATIENT
Start: 2023-12-18 | End: 2023-12-20 | Stop reason: HOSPADM

## 2023-12-17 RX ORDER — MORPHINE SULFATE 4 MG/ML
2 INJECTION INTRAVENOUS
Status: DISCONTINUED | OUTPATIENT
Start: 2023-12-17 | End: 2023-12-18

## 2023-12-17 RX ORDER — TRAZODONE HYDROCHLORIDE 100 MG/1
100 TABLET ORAL NIGHTLY
Status: DISCONTINUED | OUTPATIENT
Start: 2023-12-17 | End: 2023-12-17

## 2023-12-17 RX ORDER — BISACODYL 10 MG
10 SUPPOSITORY, RECTAL RECTAL
Status: DISCONTINUED | OUTPATIENT
Start: 2023-12-17 | End: 2023-12-20 | Stop reason: HOSPADM

## 2023-12-17 RX ORDER — ONDANSETRON 2 MG/ML
4 INJECTION INTRAMUSCULAR; INTRAVENOUS EVERY 4 HOURS PRN
Status: DISCONTINUED | OUTPATIENT
Start: 2023-12-17 | End: 2023-12-20 | Stop reason: HOSPADM

## 2023-12-17 RX ORDER — HYDRALAZINE HYDROCHLORIDE 20 MG/ML
20 INJECTION INTRAMUSCULAR; INTRAVENOUS EVERY 6 HOURS PRN
Status: DISCONTINUED | OUTPATIENT
Start: 2023-12-17 | End: 2023-12-18

## 2023-12-17 RX ORDER — BUSPIRONE HYDROCHLORIDE 7.5 MG/1
1 TABLET ORAL 2 TIMES DAILY
Status: ON HOLD | COMMUNITY
Start: 2023-09-13 | End: 2023-12-18

## 2023-12-17 RX ORDER — CARVEDILOL 6.25 MG/1
25 TABLET ORAL 2 TIMES DAILY
Status: DISCONTINUED | OUTPATIENT
Start: 2023-12-17 | End: 2023-12-20 | Stop reason: HOSPADM

## 2023-12-17 RX ORDER — CYCLOBENZAPRINE HCL 10 MG
1 TABLET ORAL 3 TIMES DAILY PRN
COMMUNITY
Start: 2023-09-11 | End: 2023-12-26 | Stop reason: SDUPTHER

## 2023-12-17 RX ORDER — AMLODIPINE BESYLATE 5 MG/1
10 TABLET ORAL DAILY
Status: DISCONTINUED | OUTPATIENT
Start: 2023-12-17 | End: 2023-12-20 | Stop reason: HOSPADM

## 2023-12-17 RX ORDER — OXYCODONE HYDROCHLORIDE 5 MG/1
2.5 TABLET ORAL
Status: DISCONTINUED | OUTPATIENT
Start: 2023-12-17 | End: 2023-12-18

## 2023-12-17 RX ORDER — FUROSEMIDE 40 MG/1
40 TABLET ORAL DAILY
Status: DISCONTINUED | OUTPATIENT
Start: 2023-12-18 | End: 2023-12-20 | Stop reason: HOSPADM

## 2023-12-17 RX ORDER — TRAZODONE HYDROCHLORIDE 150 MG/1
300 TABLET ORAL NIGHTLY
COMMUNITY

## 2023-12-17 RX ORDER — TRAZODONE HYDROCHLORIDE 150 MG/1
300 TABLET ORAL NIGHTLY
Status: DISCONTINUED | OUTPATIENT
Start: 2023-12-17 | End: 2023-12-20 | Stop reason: HOSPADM

## 2023-12-17 RX ORDER — ONDANSETRON 4 MG/1
4 TABLET, ORALLY DISINTEGRATING ORAL EVERY 4 HOURS PRN
Status: DISCONTINUED | OUTPATIENT
Start: 2023-12-17 | End: 2023-12-20 | Stop reason: HOSPADM

## 2023-12-17 RX ORDER — HYDRALAZINE HYDROCHLORIDE 20 MG/ML
20 INJECTION INTRAMUSCULAR; INTRAVENOUS ONCE
Status: COMPLETED | OUTPATIENT
Start: 2023-12-17 | End: 2023-12-17

## 2023-12-17 RX ORDER — POLYETHYLENE GLYCOL 3350 17 G/17G
1 POWDER, FOR SOLUTION ORAL
Status: DISCONTINUED | OUTPATIENT
Start: 2023-12-17 | End: 2023-12-20 | Stop reason: HOSPADM

## 2023-12-17 RX ORDER — PROMETHAZINE HYDROCHLORIDE 25 MG/1
12.5-25 TABLET ORAL EVERY 4 HOURS PRN
Status: DISCONTINUED | OUTPATIENT
Start: 2023-12-17 | End: 2023-12-20 | Stop reason: HOSPADM

## 2023-12-17 RX ORDER — ASPIRIN 81 MG/1
81 TABLET ORAL DAILY
Status: DISCONTINUED | OUTPATIENT
Start: 2023-12-18 | End: 2023-12-20 | Stop reason: HOSPADM

## 2023-12-17 RX ORDER — OMEPRAZOLE 20 MG/1
40 CAPSULE, DELAYED RELEASE ORAL DAILY
Status: DISCONTINUED | OUTPATIENT
Start: 2023-12-18 | End: 2023-12-20 | Stop reason: HOSPADM

## 2023-12-17 RX ORDER — AMOXICILLIN 250 MG
2 CAPSULE ORAL 2 TIMES DAILY
Status: DISCONTINUED | OUTPATIENT
Start: 2023-12-17 | End: 2023-12-20 | Stop reason: HOSPADM

## 2023-12-17 RX ORDER — PRAZOSIN HYDROCHLORIDE 2 MG/1
2 CAPSULE ORAL NIGHTLY
Status: ON HOLD | COMMUNITY
End: 2024-02-25

## 2023-12-17 RX ORDER — PROCHLORPERAZINE EDISYLATE 5 MG/ML
5-10 INJECTION INTRAMUSCULAR; INTRAVENOUS EVERY 4 HOURS PRN
Status: DISCONTINUED | OUTPATIENT
Start: 2023-12-17 | End: 2023-12-20 | Stop reason: HOSPADM

## 2023-12-17 RX ORDER — OXYCODONE HYDROCHLORIDE AND ACETAMINOPHEN 5; 325 MG/1; MG/1
1 TABLET ORAL EVERY 6 HOURS PRN
Status: ON HOLD | COMMUNITY
Start: 2023-12-09 | End: 2023-12-20

## 2023-12-17 RX ORDER — ISOSORBIDE MONONITRATE 60 MG/1
120 TABLET, EXTENDED RELEASE ORAL EVERY MORNING
Status: DISCONTINUED | OUTPATIENT
Start: 2023-12-18 | End: 2023-12-20 | Stop reason: HOSPADM

## 2023-12-17 RX ORDER — CYCLOBENZAPRINE HCL 10 MG
10 TABLET ORAL 3 TIMES DAILY PRN
Status: DISCONTINUED | OUTPATIENT
Start: 2023-12-17 | End: 2023-12-20 | Stop reason: HOSPADM

## 2023-12-17 RX ORDER — CARVEDILOL 6.25 MG/1
6.25 TABLET ORAL 2 TIMES DAILY
Status: DISCONTINUED | OUTPATIENT
Start: 2023-12-17 | End: 2023-12-17

## 2023-12-17 RX ORDER — PRAZOSIN HYDROCHLORIDE 2 MG/1
2 CAPSULE ORAL NIGHTLY
Status: DISCONTINUED | OUTPATIENT
Start: 2023-12-17 | End: 2023-12-20 | Stop reason: HOSPADM

## 2023-12-17 RX ORDER — BACLOFEN 20 MG/1
20 TABLET ORAL 3 TIMES DAILY
Status: ON HOLD | COMMUNITY
End: 2023-12-18

## 2023-12-17 RX ORDER — AMLODIPINE BESYLATE 10 MG/1
10 TABLET ORAL DAILY
Status: ON HOLD | COMMUNITY
Start: 2023-09-13 | End: 2024-02-26

## 2023-12-17 RX ORDER — FUROSEMIDE 40 MG/1
40 TABLET ORAL DAILY
Status: ON HOLD | COMMUNITY
Start: 2023-09-13 | End: 2024-02-26

## 2023-12-17 RX ORDER — ACETAMINOPHEN 500 MG
1000 TABLET ORAL 3 TIMES DAILY
Status: DISCONTINUED | OUTPATIENT
Start: 2023-12-17 | End: 2023-12-20 | Stop reason: HOSPADM

## 2023-12-17 RX ADMIN — PRAZOSIN HYDROCHLORIDE 2 MG: 2 CAPSULE ORAL at 22:42

## 2023-12-17 RX ADMIN — BUSPIRONE HYDROCHLORIDE 7.5 MG: 5 TABLET ORAL at 22:48

## 2023-12-17 RX ADMIN — ACETAMINOPHEN 1000 MG: 500 TABLET, FILM COATED ORAL at 21:16

## 2023-12-17 RX ADMIN — AMLODIPINE BESYLATE 10 MG: 5 TABLET ORAL at 22:43

## 2023-12-17 RX ADMIN — HYDRALAZINE HYDROCHLORIDE 20 MG: 20 INJECTION, SOLUTION INTRAMUSCULAR; INTRAVENOUS at 21:16

## 2023-12-17 RX ADMIN — AMOXICILLIN AND CLAVULANATE POTASSIUM 1 TABLET: 875; 125 TABLET, FILM COATED ORAL at 22:42

## 2023-12-17 RX ADMIN — APIXABAN 5 MG: 5 TABLET, FILM COATED ORAL at 22:43

## 2023-12-17 RX ADMIN — ONDANSETRON 4 MG: 2 INJECTION INTRAMUSCULAR; INTRAVENOUS at 23:32

## 2023-12-17 RX ADMIN — MORPHINE SULFATE 2 MG: 4 INJECTION, SOLUTION INTRAMUSCULAR; INTRAVENOUS at 22:15

## 2023-12-17 RX ADMIN — TRAZODONE HYDROCHLORIDE 300 MG: 150 TABLET ORAL at 22:47

## 2023-12-17 RX ADMIN — CARVEDILOL 25 MG: 6.25 TABLET, FILM COATED ORAL at 22:42

## 2023-12-17 ASSESSMENT — ENCOUNTER SYMPTOMS
DOUBLE VISION: 0
NERVOUS/ANXIOUS: 0
ORTHOPNEA: 0
BRUISES/BLEEDS EASILY: 0
SPUTUM PRODUCTION: 0
BLURRED VISION: 0
HALLUCINATIONS: 0
CHILLS: 0
NAUSEA: 0
WEIGHT LOSS: 0
PHOTOPHOBIA: 0
PALPITATIONS: 0
BACK PAIN: 0
FOCAL WEAKNESS: 0
NECK PAIN: 0
HEARTBURN: 0
VOMITING: 0
HEMOPTYSIS: 0
SPEECH CHANGE: 0
FLANK PAIN: 0
FEVER: 0
TREMORS: 0
HEADACHES: 1
COUGH: 0
POLYDIPSIA: 0

## 2023-12-17 ASSESSMENT — PAIN DESCRIPTION - PAIN TYPE
TYPE: ACUTE PAIN

## 2023-12-17 ASSESSMENT — LIFESTYLE VARIABLES
EVER FELT BAD OR GUILTY ABOUT YOUR DRINKING: NO
EVER HAD A DRINK FIRST THING IN THE MORNING TO STEADY YOUR NERVES TO GET RID OF A HANGOVER: NO
TOTAL SCORE: 0
SUBSTANCE_ABUSE: 0
TOTAL SCORE: 0
HAVE YOU EVER FELT YOU SHOULD CUT DOWN ON YOUR DRINKING: NO
HAVE PEOPLE ANNOYED YOU BY CRITICIZING YOUR DRINKING: NO
DOES PATIENT WANT TO STOP DRINKING: NO
TOTAL SCORE: 0
ALCOHOL_USE: NO
CONSUMPTION TOTAL: INCOMPLETE

## 2023-12-17 ASSESSMENT — FIBROSIS 4 INDEX
FIB4 SCORE: .9176629354822470637
FIB4 SCORE: .9176629354822470637

## 2023-12-17 ASSESSMENT — PATIENT HEALTH QUESTIONNAIRE - PHQ9
1. LITTLE INTEREST OR PLEASURE IN DOING THINGS: NOT AT ALL
2. FEELING DOWN, DEPRESSED, IRRITABLE, OR HOPELESS: NOT AT ALL
SUM OF ALL RESPONSES TO PHQ9 QUESTIONS 1 AND 2: 0

## 2023-12-18 PROBLEM — K21.00 GASTROESOPHAGEAL REFLUX DISEASE WITH ESOPHAGITIS WITHOUT HEMORRHAGE: Status: ACTIVE | Noted: 2023-12-18

## 2023-12-18 PROBLEM — M48.02 FORAMINAL STENOSIS OF CERVICAL REGION: Status: ACTIVE | Noted: 2023-12-18

## 2023-12-18 PROBLEM — N18.2 STAGE 2 CHRONIC KIDNEY DISEASE: Status: ACTIVE | Noted: 2023-12-18

## 2023-12-18 PROBLEM — G44.201 ACUTE INTRACTABLE TENSION-TYPE HEADACHE: Status: ACTIVE | Noted: 2023-12-17

## 2023-12-18 PROBLEM — I25.112 CORONARY ARTERY DISEASE INVOLVING NATIVE CORONARY ARTERY OF NATIVE HEART WITH REFRACTORY ANGINA PECTORIS (HCC): Status: ACTIVE | Noted: 2023-11-22

## 2023-12-18 PROBLEM — M54.12 CERVICAL NEURALGIA: Status: ACTIVE | Noted: 2023-12-18

## 2023-12-18 PROBLEM — R51.9 ACUTE INTRACTABLE HEADACHE, UNSPECIFIED HEADACHE TYPE: Status: ACTIVE | Noted: 2023-12-18

## 2023-12-18 PROBLEM — K08.89 PAIN, DENTAL: Status: ACTIVE | Noted: 2023-12-17

## 2023-12-18 LAB
ALBUMIN SERPL BCP-MCNC: 3.8 G/DL (ref 3.2–4.9)
ALBUMIN/GLOB SERPL: 1.4 G/DL
ALP SERPL-CCNC: 117 U/L (ref 30–99)
ALT SERPL-CCNC: 17 U/L (ref 2–50)
ANION GAP SERPL CALC-SCNC: 11 MMOL/L (ref 7–16)
AST SERPL-CCNC: 13 U/L (ref 12–45)
BASOPHILS # BLD AUTO: 0.6 % (ref 0–1.8)
BASOPHILS # BLD: 0.06 K/UL (ref 0–0.12)
BILIRUB SERPL-MCNC: 0.6 MG/DL (ref 0.1–1.5)
BUN SERPL-MCNC: 18 MG/DL (ref 8–22)
CALCIUM ALBUM COR SERPL-MCNC: 8.8 MG/DL (ref 8.5–10.5)
CALCIUM SERPL-MCNC: 8.6 MG/DL (ref 8.5–10.5)
CHLORIDE SERPL-SCNC: 104 MMOL/L (ref 96–112)
CO2 SERPL-SCNC: 23 MMOL/L (ref 20–33)
CREAT SERPL-MCNC: 1.13 MG/DL (ref 0.5–1.4)
EKG IMPRESSION: NORMAL
EOSINOPHIL # BLD AUTO: 0.14 K/UL (ref 0–0.51)
EOSINOPHIL NFR BLD: 1.4 % (ref 0–6.9)
ERYTHROCYTE [DISTWIDTH] IN BLOOD BY AUTOMATED COUNT: 47.2 FL (ref 35.9–50)
GFR SERPLBLD CREATININE-BSD FMLA CKD-EPI: 77 ML/MIN/1.73 M 2
GLOBULIN SER CALC-MCNC: 2.7 G/DL (ref 1.9–3.5)
GLUCOSE SERPL-MCNC: 120 MG/DL (ref 65–99)
HCT VFR BLD AUTO: 48.9 % (ref 42–52)
HGB BLD-MCNC: 16.9 G/DL (ref 14–18)
IMM GRANULOCYTES # BLD AUTO: 0.14 K/UL (ref 0–0.11)
IMM GRANULOCYTES NFR BLD AUTO: 1.4 % (ref 0–0.9)
LYMPHOCYTES # BLD AUTO: 2.44 K/UL (ref 1–4.8)
LYMPHOCYTES NFR BLD: 24.1 % (ref 22–41)
MCH RBC QN AUTO: 30.7 PG (ref 27–33)
MCHC RBC AUTO-ENTMCNC: 34.6 G/DL (ref 32.3–36.5)
MCV RBC AUTO: 88.7 FL (ref 81.4–97.8)
MONOCYTES # BLD AUTO: 0.81 K/UL (ref 0–0.85)
MONOCYTES NFR BLD AUTO: 8 % (ref 0–13.4)
NEUTROPHILS # BLD AUTO: 6.54 K/UL (ref 1.82–7.42)
NEUTROPHILS NFR BLD: 64.5 % (ref 44–72)
NRBC # BLD AUTO: 0 K/UL
NRBC BLD-RTO: 0 /100 WBC (ref 0–0.2)
PLATELET # BLD AUTO: 178 K/UL (ref 164–446)
PMV BLD AUTO: 10.2 FL (ref 9–12.9)
POTASSIUM SERPL-SCNC: 4.7 MMOL/L (ref 3.6–5.5)
PROT SERPL-MCNC: 6.5 G/DL (ref 6–8.2)
RBC # BLD AUTO: 5.51 M/UL (ref 4.7–6.1)
SODIUM SERPL-SCNC: 138 MMOL/L (ref 135–145)
TROPONIN T SERPL-MCNC: 55 NG/L (ref 6–19)
WBC # BLD AUTO: 10.1 K/UL (ref 4.8–10.8)

## 2023-12-18 PROCEDURE — 700111 HCHG RX REV CODE 636 W/ 250 OVERRIDE (IP): Mod: JZ,UD | Performed by: INTERNAL MEDICINE

## 2023-12-18 PROCEDURE — 36415 COLL VENOUS BLD VENIPUNCTURE: CPT

## 2023-12-18 PROCEDURE — 700102 HCHG RX REV CODE 250 W/ 637 OVERRIDE(OP): Mod: UD | Performed by: INTERNAL MEDICINE

## 2023-12-18 PROCEDURE — 700102 HCHG RX REV CODE 250 W/ 637 OVERRIDE(OP): Performed by: STUDENT IN AN ORGANIZED HEALTH CARE EDUCATION/TRAINING PROGRAM

## 2023-12-18 PROCEDURE — 96376 TX/PRO/DX INJ SAME DRUG ADON: CPT

## 2023-12-18 PROCEDURE — 93010 ELECTROCARDIOGRAM REPORT: CPT | Performed by: STUDENT IN AN ORGANIZED HEALTH CARE EDUCATION/TRAINING PROGRAM

## 2023-12-18 PROCEDURE — A9270 NON-COVERED ITEM OR SERVICE: HCPCS | Performed by: STUDENT IN AN ORGANIZED HEALTH CARE EDUCATION/TRAINING PROGRAM

## 2023-12-18 PROCEDURE — 700105 HCHG RX REV CODE 258: Performed by: STUDENT IN AN ORGANIZED HEALTH CARE EDUCATION/TRAINING PROGRAM

## 2023-12-18 PROCEDURE — 700111 HCHG RX REV CODE 636 W/ 250 OVERRIDE (IP): Performed by: STUDENT IN AN ORGANIZED HEALTH CARE EDUCATION/TRAINING PROGRAM

## 2023-12-18 PROCEDURE — 84484 ASSAY OF TROPONIN QUANT: CPT

## 2023-12-18 PROCEDURE — 700101 HCHG RX REV CODE 250: Performed by: STUDENT IN AN ORGANIZED HEALTH CARE EDUCATION/TRAINING PROGRAM

## 2023-12-18 PROCEDURE — 99233 SBSQ HOSP IP/OBS HIGH 50: CPT | Performed by: STUDENT IN AN ORGANIZED HEALTH CARE EDUCATION/TRAINING PROGRAM

## 2023-12-18 PROCEDURE — 85025 COMPLETE CBC W/AUTO DIFF WBC: CPT

## 2023-12-18 PROCEDURE — A9270 NON-COVERED ITEM OR SERVICE: HCPCS | Mod: UD | Performed by: INTERNAL MEDICINE

## 2023-12-18 PROCEDURE — 97535 SELF CARE MNGMENT TRAINING: CPT

## 2023-12-18 PROCEDURE — 97166 OT EVAL MOD COMPLEX 45 MIN: CPT

## 2023-12-18 PROCEDURE — 770020 HCHG ROOM/CARE - TELE (206)

## 2023-12-18 PROCEDURE — 97162 PT EVAL MOD COMPLEX 30 MIN: CPT

## 2023-12-18 PROCEDURE — 80053 COMPREHEN METABOLIC PANEL: CPT

## 2023-12-18 RX ORDER — PROCHLORPERAZINE EDISYLATE 5 MG/ML
10 INJECTION INTRAMUSCULAR; INTRAVENOUS ONCE
Status: COMPLETED | OUTPATIENT
Start: 2023-12-18 | End: 2023-12-18

## 2023-12-18 RX ORDER — LIDOCAINE 4 G/G
1 PATCH TOPICAL EVERY 24 HOURS
Status: DISCONTINUED | OUTPATIENT
Start: 2023-12-18 | End: 2023-12-20 | Stop reason: HOSPADM

## 2023-12-18 RX ORDER — POTASSIUM CHLORIDE 20 MEQ/1
20 TABLET, EXTENDED RELEASE ORAL DAILY
COMMUNITY

## 2023-12-18 RX ORDER — OXYCODONE HYDROCHLORIDE 5 MG/1
5 TABLET ORAL
Status: DISCONTINUED | OUTPATIENT
Start: 2023-12-18 | End: 2023-12-20

## 2023-12-18 RX ORDER — SODIUM CHLORIDE 9 MG/ML
INJECTION, SOLUTION INTRAVENOUS ONCE
Status: COMPLETED | OUTPATIENT
Start: 2023-12-18 | End: 2023-12-19

## 2023-12-18 RX ORDER — LISINOPRIL 40 MG/1
40 TABLET ORAL DAILY
Status: ON HOLD | COMMUNITY
End: 2024-02-25

## 2023-12-18 RX ORDER — MAGNESIUM SULFATE HEPTAHYDRATE 40 MG/ML
4 INJECTION, SOLUTION INTRAVENOUS ONCE
Status: COMPLETED | OUTPATIENT
Start: 2023-12-18 | End: 2023-12-19

## 2023-12-18 RX ORDER — TRAZODONE HYDROCHLORIDE 100 MG/1
200 TABLET ORAL NIGHTLY
Status: ON HOLD | COMMUNITY
End: 2023-12-20

## 2023-12-18 RX ORDER — DIPHENHYDRAMINE HYDROCHLORIDE 50 MG/ML
25 INJECTION INTRAMUSCULAR; INTRAVENOUS ONCE
Status: COMPLETED | OUTPATIENT
Start: 2023-12-18 | End: 2023-12-18

## 2023-12-18 RX ADMIN — LIDOCAINE 1 PATCH: 4 PATCH TOPICAL at 20:21

## 2023-12-18 RX ADMIN — ACETAMINOPHEN 1000 MG: 500 TABLET, FILM COATED ORAL at 07:41

## 2023-12-18 RX ADMIN — SODIUM CHLORIDE: 9 INJECTION, SOLUTION INTRAVENOUS at 20:16

## 2023-12-18 RX ADMIN — APIXABAN 5 MG: 5 TABLET, FILM COATED ORAL at 05:22

## 2023-12-18 RX ADMIN — OXYCODONE 5 MG: 5 TABLET ORAL at 01:34

## 2023-12-18 RX ADMIN — TRAZODONE HYDROCHLORIDE 300 MG: 150 TABLET ORAL at 20:23

## 2023-12-18 RX ADMIN — LISINOPRIL 40 MG: 20 TABLET ORAL at 05:23

## 2023-12-18 RX ADMIN — MAGNESIUM SULFATE HEPTAHYDRATE 4 G: 4 INJECTION, SOLUTION INTRAVENOUS at 20:18

## 2023-12-18 RX ADMIN — BUSPIRONE HYDROCHLORIDE 7.5 MG: 5 TABLET ORAL at 05:21

## 2023-12-18 RX ADMIN — PRAZOSIN HYDROCHLORIDE 2 MG: 2 CAPSULE ORAL at 20:19

## 2023-12-18 RX ADMIN — DIPHENHYDRAMINE HYDROCHLORIDE 25 MG: 50 INJECTION, SOLUTION INTRAMUSCULAR; INTRAVENOUS at 20:20

## 2023-12-18 RX ADMIN — ACETAMINOPHEN 1000 MG: 500 TABLET, FILM COATED ORAL at 20:19

## 2023-12-18 RX ADMIN — OXYCODONE 5 MG: 5 TABLET ORAL at 14:29

## 2023-12-18 RX ADMIN — CARVEDILOL 25 MG: 6.25 TABLET, FILM COATED ORAL at 16:55

## 2023-12-18 RX ADMIN — OXYCODONE 5 MG: 5 TABLET ORAL at 18:20

## 2023-12-18 RX ADMIN — AMOXICILLIN AND CLAVULANATE POTASSIUM 1 TABLET: 875; 125 TABLET, FILM COATED ORAL at 05:23

## 2023-12-18 RX ADMIN — BUSPIRONE HYDROCHLORIDE 7.5 MG: 5 TABLET ORAL at 16:55

## 2023-12-18 RX ADMIN — CARVEDILOL 25 MG: 6.25 TABLET, FILM COATED ORAL at 05:22

## 2023-12-18 RX ADMIN — MORPHINE SULFATE 2 MG: 4 INJECTION, SOLUTION INTRAMUSCULAR; INTRAVENOUS at 12:22

## 2023-12-18 RX ADMIN — MORPHINE SULFATE 2 MG: 4 INJECTION, SOLUTION INTRAMUSCULAR; INTRAVENOUS at 05:20

## 2023-12-18 RX ADMIN — APIXABAN 5 MG: 5 TABLET, FILM COATED ORAL at 16:56

## 2023-12-18 RX ADMIN — AMOXICILLIN AND CLAVULANATE POTASSIUM 1 TABLET: 875; 125 TABLET, FILM COATED ORAL at 16:55

## 2023-12-18 RX ADMIN — OXYCODONE 5 MG: 5 TABLET ORAL at 10:52

## 2023-12-18 RX ADMIN — ATORVASTATIN CALCIUM 80 MG: 80 TABLET, FILM COATED ORAL at 05:23

## 2023-12-18 RX ADMIN — ACETAMINOPHEN 1000 MG: 500 TABLET, FILM COATED ORAL at 14:29

## 2023-12-18 RX ADMIN — ISOSORBIDE MONONITRATE 120 MG: 60 TABLET, EXTENDED RELEASE ORAL at 05:21

## 2023-12-18 RX ADMIN — PROCHLORPERAZINE EDISYLATE 10 MG: 5 INJECTION INTRAMUSCULAR; INTRAVENOUS at 20:19

## 2023-12-18 RX ADMIN — OMEPRAZOLE 40 MG: 20 CAPSULE, DELAYED RELEASE ORAL at 05:22

## 2023-12-18 RX ADMIN — ASPIRIN 81 MG: 81 TABLET, COATED ORAL at 05:22

## 2023-12-18 RX ADMIN — FUROSEMIDE 40 MG: 40 TABLET ORAL at 05:22

## 2023-12-18 ASSESSMENT — PAIN DESCRIPTION - PAIN TYPE
TYPE: ACUTE PAIN

## 2023-12-18 ASSESSMENT — COGNITIVE AND FUNCTIONAL STATUS - GENERAL
MOBILITY SCORE: 21
SUGGESTED CMS G CODE MODIFIER MOBILITY: CJ
CLIMB 3 TO 5 STEPS WITH RAILING: A LITTLE
STANDING UP FROM CHAIR USING ARMS: A LITTLE
WALKING IN HOSPITAL ROOM: A LITTLE
SUGGESTED CMS G CODE MODIFIER DAILY ACTIVITY: CH
DAILY ACTIVITIY SCORE: 24

## 2023-12-18 ASSESSMENT — ENCOUNTER SYMPTOMS
HEADACHES: 1
NECK PAIN: 1
FEVER: 0
NAUSEA: 0
EYE PAIN: 0
LOSS OF CONSCIOUSNESS: 1
CHILLS: 0
DEPRESSION: 0
DIZZINESS: 1
FALLS: 1
NERVOUS/ANXIOUS: 0
BLURRED VISION: 1
VOMITING: 0

## 2023-12-18 ASSESSMENT — GAIT ASSESSMENTS
DEVIATION: INCREASED BASE OF SUPPORT;OTHER (COMMENT)
DISTANCE (FEET): 150
GAIT LEVEL OF ASSIST: STANDBY ASSIST

## 2023-12-18 ASSESSMENT — ACTIVITIES OF DAILY LIVING (ADL): TOILETING: INDEPENDENT

## 2023-12-18 NOTE — ASSESSMENT & PLAN NOTE
Likely acute pain superimposed on chronic essential HTN  Continue PTA antihypertensives  At risk of hypoperfusion and neurologic injury with rapid BP shifts, PRN antihypertensives discontinued

## 2023-12-18 NOTE — PROGRESS NOTES
[facility] DIRECT ADMISSION REPORT  Transferring facility: [place]   Transferring physician: dr Olivares    Chief complaint: Headache  Pertinent history & patient course:     53-year-old male with a complicated medical history including prior cerebral sinus thrombosis and stroke on Eliquis and aspirin and coronary disease status post 5 stents who was admitted to our hospital a month ago with small new stroke.  At that time he presented outside hospital with a headache.  He presents back to [facility] with headache again but no focal neurological deficits and no anginal equivalents.  He was noted to have an elevated troponin of 224 but no acute EKG changes.  His vital signs are stable.  His CTA head and neck were negative for any large vessel occlusions.  He is being transferred to our hospital for likely MRI and trending troponins and possible neurological consultation again.    Pertinent imaging & lab results: See above  Consultants called prior to transfer and pertinent input from consultants: None  Code Status: Full code full care per transferring provider, I personally verified with the transferring provider patient's code status and the transferring provider has confirmed this with the patient.  Reason for Transfer: As noted above  Further work up or recommendations requested prior to transfer: None    Patient accepted for transfer: Yes  Accepting [facility] Facility: [facility] - Nursing to notify the Triage Coordinator in the RTOC via Voalte or Phone ext. [phone] when patient arrives to the unit. The Triage Coordinator will assign the admitting provider.    Consultants to be called upon arrival: Consider neurology  Admission status: Observation.   Floor requested: Neuroscience  If ICU transfer, name of intensivist case discussed with and pertinent input from critical care: Not applicable    The admitting provider is the point of contact for questions or concerns regarding patient's  care.

## 2023-12-18 NOTE — PROGRESS NOTES
Monitor summary: SR 75-89, WI -0.14, QRS -0.09, QT -0.37, with rare PVCs per strip from the monitor room.

## 2023-12-18 NOTE — PROGRESS NOTES
Med Rec complete per patient   Allergies reviewed-no changes  Antibiotics in the past 30 days:no  Anticoagulant in past 14 days:yes  Anticoagulant:Eliquis 5 mg Last dose:12/16/23  Pharmacy patient utilizes:[facility] in [place]    Pt was unsure of certain BP medications he takes. States 3 different Doctors have prescribed different BP medications and unsure which ones he should be taking.    Pt states he has always been on Percocet  mg PRN, however per  no records show of that strength.     Reviewed BP meds with [facility]     Pt states he takes a total of Trazadone 500 mg every night

## 2023-12-18 NOTE — ASSESSMENT & PLAN NOTE
Peaked at 62  Likely ongoing demand ischemia from known CAD s/p 5 MAYRA  EKG not indicative of acute ischemia  Telemetry

## 2023-12-18 NOTE — CARE PLAN
The patient is Stable - Low risk of patient condition declining or worsening    Shift Goals  Clinical Goals: Monitor neuro status and hemodynamics  Patient Goals: rest  Family Goals: leigh ann`    Progress made toward(s) clinical / shift goals:  patient alert and oriented. Monitored patient for high systolic pressure, controlled by prn and due medications. Latest /67. Expressed relief from headache after prn was provide. Call bell within reach.    Patient is not progressing towards the following goals:

## 2023-12-18 NOTE — THERAPY
Physical Therapy   Initial Evaluation     Patient Name: Divine Lugo  Age:  53 y.o., Sex:  male  Medical Record #: [id number]  Today's Date: 12/18/2023     Precautions  Precautions: Fall Risk;Swallow Precautions;Other (See Comments)  Comments: Seizure precautions    Assessment  Patient is 53 y.o. male who presented 12/17/2023 with complaints of headache    Found to have NSTEMI-Type 2?, hypertensive urgency   MRI-Pending     PMH: Recent CVA with right-sided residual weakness(11/22/23), cerebral sinus rhythm venous thrombosis, chronic anticoagulation with Eliquis, CAD s/p stents placement, arthritis, HTN, MI, PE     Patient seen for PT evaluation. Patient in bed, agreeable for the session. Able to demonstrate functional mobility tasks as detailed below. Will continue to benefit from PT services to address mobility, balance & strength deficit. Recommend out-patient PT upon DC.     Plan    Physical Therapy Initial Treatment Plan   Treatment Plan : Gait Training, Neuro Re-Education / Balance, Stair Training, Therapeutic Activities, Therapeutic Exercise  Treatment Frequency: 3 Times per Week  Duration: Until Therapy Goals Met    DC Equipment Recommendations: None  Discharge Recommendations: Recommend outpatient physical therapy services to address higher level deficits    Objective     12/18/23 1351   Charge Group   PT Evaluation PT Evaluation Mod   PT Self Care / Home Evaluation (Units) 1   Total Time Spent   PT Evaluation Time Spent (Mins) 13   PT Self Care/Home Evaluation Time Spent (Mins) 10   PT Total Time Spent (Calculated) 23   Initial Contact Note    Initial Contact Note Order Received and Verified, Physical Therapy Evaluation in Progress with Full Report to Follow.   Precautions   Precautions Fall Risk;Swallow Precautions;Other (See Comments)   Comments Seizure precautions   Vitals   O2 Delivery Device None - Room Air   Pain   Pain Scales 0 to 10 Scale    Intervention Medication (see MAR);Rest   Pain 0 - 10  Group   Location Hip;Neck;Head  (R hip)   Therapist Pain Assessment Prior to Activity;During Activity;Post Activity   Prior Living Situation   Prior Services None  (Patient unable to begin out-patient PT after his recent CVA)   Housing / Facility 1 Story House   Steps Into Home 4   Steps In Home 0   Rail Both Rail (Steps into Home)   Equipment Owned Single Point Cane   Lives with - Patient's Self Care Capacity Significant Other   Comments Patient mentioned that his girlfriend is a licensed caregiver for him.   Prior Level of Functional Mobility   Bed Mobility Independent   Transfer Status Independent   Ambulation Independent   Ambulation Distance Household   Assistive Devices Used None   Stairs Independent   Comments Patient mentioned that he uses SPC for ambulation PRN, when his R hip bothers him. Cold weather aggravates the pain.   Cognition    Level of Consciousness Alert   Passive ROM Lower Body   Passive ROM Lower Body WDL   Active ROM Lower Body    Active ROM Lower Body  WDL   Strength Lower Body   Lower Body Strength  X   Comments Grossly LLE WFL; R hip 3/5, R knee extension 3+/5, R ankle DF 3+/5, R ankle PF 4/5   Sensation Lower Body   Lower Extremity Sensation   X   Comments Reports occasional numbness in RLE-attributing to neck   Lower Body Muscle Tone   Lower Body Muscle Tone  WDL   Coordination Lower Body    Coordination Lower Body  X   Comments Alternate ankle movements-reduced co-ordination; Heel-shin-generalized slow speed   Other Treatments   Other Treatments Provided Patient educated about importance of daily mobility/ambulation, OOB to chair for meals with nursing. Patient reported feeling fatigue easily at home, discussed about taking seated rest breaks, activity pacing. Discussed DC recommendations-agreeable for out-patient PT closer to his house. His girlfriend would be able to drive him if needed. Educated about stroke symptoms and importance of calling 911 sooner than later.   Balance  Assessment   Sitting Balance (Static) Fair +   Sitting Balance (Dynamic) Fair   Standing Balance (Static) Fair   Standing Balance (Dynamic) Fair   Weight Shift Sitting Good   Weight Shift Standing Fair   Comments Seated EOB; Standing W/O AD   Bed Mobility    Supine to Sit Supervised   Scooting Supervised   Rolling Supervised   Comments HOB flat   Gait Analysis   Gait Level Of Assist Standby Assist   Assistive Device None   Distance (Feet) 150   Deviation Increased Base Of Support;Other (Comment)  (Asymmetrical step & stride length, reduced stance time on RLE)   # of Stairs Climbed 3  (x2 times)   Level of Assist with Stairs Standby Assist   Comments Patient ambulated in the room, hallway with slow steady pace, no overt loss of balance; Negotiated 1 step at a time and also alternate step at a time; cues for appropriate sequencing of LE due to R hip pain & RLE weakness   Functional Mobility   Sit to Stand Standby Assist   Bed, Chair, Wheelchair Transfer Standby Assist   Transfer Method Stand Step   Mobility Bed-chair, W/O AD, cues for hand placement, LE placement   How much difficulty does the patient currently have...   Turning over in bed (including adjusting bedclothes, sheets and blankets)? 4   Sitting down on and standing up from a chair with arms (e.g., wheelchair, bedside commode, etc.) 4   Moving from lying on back to sitting on the side of the bed? 4   How much help from another person does the patient currently need...   Moving to and from a bed to a chair (including a wheelchair)? 3   Need to walk in a hospital room? 3   Climbing 3-5 steps with a railing? 3   6 clicks Mobility Score 21   Activity Tolerance   Sitting in Chair Post session   Patient / Family Goals    Patient / Family Goal #1 To return home   Short Term Goals    Short Term Goal # 1 Patient will perform sit-stand & chair transfers with supervision in 6 visits   Short Term Goal # 2 Patient will ambulate > 300 feet with supervision in 6 visits    Short Term Goal # 3 Patient will negotiate 5 steps with rails with supervision in 6 visits   Education Group   Education Provided Role of Physical Therapist   Role of Physical Therapist Patient Response Patient;Acceptance;Explanation;Verbal Demonstration   Physical Therapy Initial Treatment Plan    Treatment Plan  Gait Training;Neuro Re-Education / Balance;Stair Training;Therapeutic Activities;Therapeutic Exercise   Treatment Frequency 3 Times per Week   Duration Until Therapy Goals Met   Anticipated Discharge Equipment and Recommendations   DC Equipment Recommendations None   Discharge Recommendations Recommend outpatient physical therapy services to address higher level deficits   Interdisciplinary Plan of Care Collaboration   IDT Collaboration with  Nursing;Occupational Therapist   Patient Position at End of Therapy Seated;Chair Alarm On;Call Light within Reach;Tray Table within Reach;Phone within Reach   Session Information   Date / Session Number  12/18-1(1/3, 12/24)

## 2023-12-18 NOTE — H&P
Hospital Medicine History & Physical Note    Date of Service  12/17/2023    Primary Care Physician  PHILLIP Bob.      Code Status  Full Code    Chief Complaint  Headache    History of Presenting Illness  Divine Lugo is a 53 y.o. male with past medical history of recent CVA with right-sided residual weakness, cerebral sinus rhythm venous thrombosis, chronic anticoagulation with Eliquis, CAD, 5 stents placement, who presented 12/17/2023 with complaints of headache that started yesterday, mild without elevating aggravating factors, constant diffuse, associated with some blurred vision, worsening of chronic tinnitus, more on the left side.  Denies nausea, dizziness, sensitivity, speech changes, focal numbness, change in right-sided weakness.  He reported he has been compliant with medications, including Eliquis and aspirin.  Additional concerns include right-sided lower facial swelling and pain with mastication and feeling of heartburn in the last 2 days  Vitals at outside hospital, blood pressure 183/104, heart rate 78, on room air, respiratory rate 15, afebrile.  Pain level night of 9/10.  .  UDS positive for cannabinoids and tricyclic antidepressants urine yellow hazy, negative for glucose ketones bilirubin, nitrates, leukocyte esterase.  Trace blood, rare bacteria, rare mucus, no crystals, no casts.  CBC: WBC 11.78, otherwise normal.  Chemistry: Creatinine 1.38, BUN 22 unremarkable  INR 0.88  Troponin I high-sensitivity 224, CPK 60  Magnesium is normal.  CT angiogram of the head and neck unremarkable.  Chest x-ray: Lungs are clear.  No consolidation edema effusion or pneumothorax.    I discussed the plan of care with patient and bedside RN .    Review of Systems  Review of Systems   Constitutional:  Negative for chills, fever and weight loss.   HENT:  Negative for ear pain, hearing loss and tinnitus.    Eyes:  Negative for blurred vision, double vision and photophobia.   Respiratory:  Negative  for cough, hemoptysis and sputum production.    Cardiovascular:  Negative for chest pain, palpitations and orthopnea.   Gastrointestinal:  Negative for heartburn, nausea and vomiting.   Genitourinary:  Negative for dysuria, flank pain, frequency and hematuria.   Musculoskeletal:  Negative for back pain, joint pain and neck pain.   Skin:  Negative for itching and rash.   Neurological:  Positive for headaches. Negative for tremors, speech change and focal weakness.   Endo/Heme/Allergies:  Negative for environmental allergies and polydipsia. Does not bruise/bleed easily.   Psychiatric/Behavioral:  Negative for hallucinations and substance abuse. The patient is not nervous/anxious.        Past Medical History   has a past medical history of Angina, Arthritis, Back fracture, Backpain, CAD (coronary artery disease), DVT, Fracture closed, pelvis, HTN (hypertension) (6/7/2012), Hypertension, Lipoma NOS, Myocardial infarct (HCC), Nephrolithiasis (6/7/2012), Nonmalignant tumors, Personal history of venous thrombosis and embolism, Pneumonia, Pulmonary embolism (HCC), Renal disorder, Stroke determined by clinical assessment ([facility]) (11/21/2023), and Unspecified urinary incontinence (PE).    Surgical History   has a past surgical history that includes other orthopedic surgery; other; other cardiac surgery; cystoscopy stent placement (6/3/2012); ureteroscopy (6/3/2012); and lasertripsy (6/3/2012).     Family History  family history is not on file.   Family history reviewed with patient. There is no family history that is pertinent to the chief complaint.     Social History   reports that he has been smoking cigarettes. His smokeless tobacco use includes chew. He reports current drug use. Drug: Marijuana. He reports that he does not drink alcohol.    Allergies  Allergies   Allergen Reactions    Gabapentin Unspecified     Causes patient to seizures    Bloodless      Pt states he would accept blood in life or death situation     Depakote [Valproic Acid] Unspecified     Non epileptic seizure ( stated by patient )    Nkda [No Known Drug Allergy]        Medications  Prior to Admission Medications   Prescriptions Last Dose Informant Patient Reported? Taking?   ELIQUIS 5 MG Tab   No No   Sig: Take 1 Tablet by mouth 2 times a day for 30 days.   EPINEPHrine (EPIPEN) 0.3 MG/0.3ML Solution Auto-injector solution for injection   No No   Sig: INJECT CONTENTS OF 1 PEN SUBCUTANEOUSLY AS NEEDED FOR ALLERGIC REACTION MAY REPEAT DOSE AFTER 5 TO 15 MINTUTES   aspirin 81 MG EC tablet   No No   Sig: Take 1 Tablet by mouth every day for 30 days.   atorvastatin (LIPITOR) 80 MG tablet   No No   Sig: Take 1 Tablet by mouth every day.   carvedilol (COREG) 6.25 MG Tab   No No   Sig: Take 1 Tablet by mouth 2 times a day for 30 days.   isosorbide mononitrate (IMDUR) 120 MG CR tablet   No No   Sig: Take 1 Tablet by mouth every morning for 30 days.   lisinopril (PRINIVIL) 40 MG tablet   No No   Sig: Take 1 Tablet by mouth every day.   memantine (NAMENDA) 10 MG Tab   No No   Sig: Take 1 Tablet by mouth every day for 30 days.   nitroglycerin (NITROSTAT) 0.4 MG SL Tab   No No   Sig: Place 1 Tablet under the tongue as needed for Chest Pain for up to 30 days. DISSOLVE ONE TABLET UNDER THE TONGUE EVERY 5 MINUTES AS NEEDED FOR CHEST PAIN DO NOT EXCEED A TOTAL OF 3 DOSES IN 15 MINUTES   omeprazole (PRILOSEC) 40 MG delayed-release capsule   No No   Sig: Take 1 Capsule by mouth every day.   ondansetron (ZOFRAN ODT) 4 MG TABLET DISPERSIBLE   No No   Sig: Take 1 Tablet by mouth every 8 hours as needed for Nausea/Vomiting.   traZODone (DESYREL) 100 MG Tab   No No   Sig: Take 1 Tablet by mouth every evening for 30 days.      Facility-Administered Medications: None       Physical Exam  Temp:  [36.6 °C (97.9 °F)] 36.6 °C (97.9 °F)  Pulse:  [80] 80  Resp:  [16] 16  BP: (182)/(111) 182/111  Blood Pressure: (!) 182/111   Temperature: 36.6 °C (97.9 °F)   Pulse: 80   Respiration: 16      "      Physical Exam  Vitals and nursing note reviewed.   Constitutional:       General: He is not in acute distress.     Appearance: Normal appearance. He is obese.   HENT:      Head: Normocephalic and atraumatic.      Comments: Right sided lower face swelling, tenderness to palpation  Poor dentition      Nose: Nose normal.      Mouth/Throat:      Mouth: Mucous membranes are moist.   Eyes:      Extraocular Movements: Extraocular movements intact.      Pupils: Pupils are equal, round, and reactive to light.   Cardiovascular:      Rate and Rhythm: Normal rate and regular rhythm.   Pulmonary:      Effort: Pulmonary effort is normal.      Breath sounds: Normal breath sounds.   Abdominal:      General: Abdomen is flat. There is no distension.      Tenderness: There is no abdominal tenderness.   Musculoskeletal:         General: No swelling or deformity. Normal range of motion.      Cervical back: Normal range of motion and neck supple.   Skin:     General: Skin is warm and dry.   Neurological:      General: No focal deficit present.      Mental Status: He is alert and oriented to person, place, and time.      Comments: Right arm and leg weakness 4 out of 5   Psychiatric:         Mood and Affect: Mood normal.         Behavior: Behavior normal.         Laboratory:          No results for input(s): \"ALTSGPT\", \"ASTSGOT\", \"ALKPHOSPHAT\", \"TBILIRUBIN\", \"DBILIRUBIN\", \"GAMMAGT\", \"AMYLASE\", \"LIPASE\", \"ALB\", \"PREALBUMIN\", \"GLUCOSE\" in the last 72 hours.      No results for input(s): \"NTPROBNP\" in the last 72 hours.      No results for input(s): \"TROPONINT\" in the last 72 hours.    Imaging:  OUTSIDE IMAGES-DX CHEST   Final Result      OUTSIDE IMAGES-CT HEAD   Final Result              Assessment/Plan:  Justification for Admission Status  I anticipate this patient is appropriate for observation status at this time because headache    Patient will need a Telemetry bed on NEUROLOGY service .  The need is secondary to headache.    * " Headache- (present on admission)  Assessment & Plan  Presented hypertensive 182/111 with headache for 2 days with some blurry vision, with no new neurodeficit beside right-sided weakness  Will evaluate for new stroke or sinus venous thrombosis with repeat MRI of the brain and MRV of the brain  Monitor with neurochecks every 4 hours  Blood pressure control  Monitor on telemetry    Dental infection  Assessment & Plan  P.o. Augmentin started  Follow-up with dentist as outpatient    Type II NSTEMI  Assessment & Plan  Troponin 224 without acute STEMI on EKG  Reported heartburn in the last 2 days  Probably type II NSTEMI due to uncontrolled hypertension  Plan to control blood pressure  Continue Eliquis, aspirin, lisinopril, carvedilol, Lipitor  Repeat troponin, monitor on telemetry    Hypertensive urgency- (present on admission)  Assessment & Plan  Blood pressure 182/111  Will give IV hydralazine 20 mg and as needed  Continue carvedilol, lisinopril, amlodipine, restart Lasix  Monitor blood pressure        VTE prophylaxis: therapeutic anticoagulation with Eliquis

## 2023-12-18 NOTE — THERAPY
Occupational Therapy   Initial Evaluation     Patient Name: Divine Lugo  Age:  53 y.o., Sex:  male  Medical Record #: [id number]  Today's Date: 12/18/2023     Precautions: Fall Risk    Assessment  Patient is 53 y.o. male with hx of recent CVA with R weakness, cerebral sinus rhythm venous thrombosis, CAD, and five stents who presented with headache and associated blurred vision. Pt seen for OT evaluation and primarily limited by head and neck pain, but able to demonstrate ADLs SPV. He has baseline R weakness and diminished RUE sensation. Pt reported his spouse can provide assist PRN, however he is baseline independent. Anticipate no further OT needs. Patient will not be actively followed for occupational therapy services at this time, however may be seen if requested by physician for 1 more visit within 30 days to address any discharge or equipment needs.     Plan    Occupational Therapy Initial Treatment Plan   Duration: (P) Discharge Needs Only    DC Equipment Recommendations: (P) None  Discharge Recommendations: (P) Anticipate that the patient will have no further occupational therapy needs after discharge from the hospital        12/18/23 1345   Prior Living Situation   Prior Services None   Housing / Facility [address]   Bathroom Set up Bathtub / Shower Combination   Lives with - Patient's Self Care Capacity Significant Other   Prior Level of ADL Function   Self Feeding Independent   Grooming / Hygiene Independent   Bathing Independent   Dressing Independent   Toileting Independent   Prior Level of IADL Function   Medication Management Independent   Laundry Requires Assist   Kitchen Mobility Requires Assist   Finances Requires Assist   Home Management Requires Assist   Shopping Requires Assist   History of Falls   History of Falls Yes   Date of Last Fall   (history of falls in the last few months)   Precautions   Precautions Fall Risk   Cognition    Cognition / Consciousness WDL   Comments agreeable    Active ROM Upper Body   Active ROM Upper Body  X   Comments limited by neck pain but functional   Strength Upper Body   Upper Body Strength  X   Comments R UE gross weakness roughly 3/5 strength   Sensation Upper Body   Upper Extremity Sensation  X   Comments reports tingling in full R UE   Balance Assessment   Sitting Balance (Static) Good   Sitting Balance (Dynamic) Good   Standing Balance (Static) Fair   Standing Balance (Dynamic) Fair   Weight Shift Sitting Good   Weight Shift Standing Fair   Comments without AD   Bed Mobility    Supine to Sit Supervised   Sit to Supine Supervised   Scooting Supervised   ADL Assessment   Grooming Supervision;Standing   Upper Body Dressing Supervision   Lower Body Dressing Supervision   Toileting Supervision   How much help from another person does the patient currently need...   6 Clicks Daily Activity Score 24   Functional Mobility   Sit to Stand Standby Assist   Toilet Transfers Standby Assist   Patient / Family Goals   Patient / Family Goal #1 to go home   Education Group   Role of Occupational Therapist Patient Response Patient;Acceptance;Explanation;Demonstration   Occupational Therapy Initial Treatment Plan    Duration Discharge Needs Only   Anticipated Discharge Equipment and Recommendations   DC Equipment Recommendations None   Discharge Recommendations Anticipate that the patient will have no further occupational therapy needs after discharge from the hospital

## 2023-12-18 NOTE — CARE PLAN
The patient is Stable - Low risk of patient condition declining or worsening    Shift Goals  Clinical Goals: Monitor neuro, MRI, increse mobility, pain control  Patient Goals: Eat, pain control  Family Goals: leigh ann`    Progress made toward(s) clinical / shift goals:    Neuro checks remain unchanged.   MRI pending  Pt up with PT who recommends home with outpatient therapy.   Oxycodone in use for pain control.      Problem: Knowledge Deficit - Standard  Goal: Patient and family/care givers will demonstrate understanding of plan of care, disease process/condition, diagnostic tests and medications  Outcome: Progressing     Problem: Pain - Standard  Goal: Alleviation of pain or a reduction in pain to the patient’s comfort goal  Outcome: Progressing     Problem: Neuro Status  Goal: Neuro status will remain stable or improve  Outcome: Progressing     Problem: Self Care  Goal: Patient will have the ability to perform ADLs independently or with assistance (bathe, groom, dress, toilet and feed)  Outcome: Progressing     Problem: Mobility  Goal: Patient's capacity to carry out activities will improve  Outcome: Progressing

## 2023-12-18 NOTE — ASSESSMENT & PLAN NOTE
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

## 2023-12-18 NOTE — PROGRESS NOTES
4 Eyes Skin Assessment Completed by JACQUELINE Mon and JACQUELINE Holder.    Head WDL  Ears WDL  Nose WDL  Mouth WDL  Neck Scar  Breast/Chest Scar  Shoulder Blades WDL  Spine WDL  (R) Arm/Elbow/Hand Scar  (L) Arm/Elbow/Hand Scar  Abdomen Scar  Groin WDL  Scrotum/Coccyx/Buttocks WDL  (R) Leg Scar  (L) Leg Scar  (R) Heel/Foot/Toe Scar  (L) Heel/Foot/Toe Scar          Devices In Places Blood Pressure Cuff      Interventions In Place N/A    Possible Skin Injury No    Pictures Uploaded Into Epic N/A  Wound Consult Placed N/A  RN Wound Prevention Protocol Ordered No

## 2023-12-19 ENCOUNTER — APPOINTMENT (OUTPATIENT)
Dept: RADIOLOGY | Facility: MEDICAL CENTER | Age: 53
DRG: 103 | End: 2023-12-19
Attending: STUDENT IN AN ORGANIZED HEALTH CARE EDUCATION/TRAINING PROGRAM
Payer: MEDICAID

## 2023-12-19 ENCOUNTER — ANESTHESIA EVENT (OUTPATIENT)
Dept: RADIOLOGY | Facility: MEDICAL CENTER | Age: 53
DRG: 103 | End: 2023-12-19
Payer: MEDICAID

## 2023-12-19 ENCOUNTER — ANESTHESIA (OUTPATIENT)
Dept: RADIOLOGY | Facility: MEDICAL CENTER | Age: 53
DRG: 103 | End: 2023-12-19
Payer: MEDICAID

## 2023-12-19 PROCEDURE — 700102 HCHG RX REV CODE 250 W/ 637 OVERRIDE(OP): Performed by: INTERNAL MEDICINE

## 2023-12-19 PROCEDURE — 160002 HCHG RECOVERY MINUTES (STAT)

## 2023-12-19 PROCEDURE — 700105 HCHG RX REV CODE 258: Performed by: ANESTHESIOLOGY

## 2023-12-19 PROCEDURE — 160035 HCHG PACU - 1ST 60 MINS PHASE I

## 2023-12-19 PROCEDURE — 700102 HCHG RX REV CODE 250 W/ 637 OVERRIDE(OP): Performed by: STUDENT IN AN ORGANIZED HEALTH CARE EDUCATION/TRAINING PROGRAM

## 2023-12-19 PROCEDURE — 700111 HCHG RX REV CODE 636 W/ 250 OVERRIDE (IP): Performed by: ANESTHESIOLOGY

## 2023-12-19 PROCEDURE — 700101 HCHG RX REV CODE 250: Performed by: ANESTHESIOLOGY

## 2023-12-19 PROCEDURE — A9270 NON-COVERED ITEM OR SERVICE: HCPCS | Performed by: STUDENT IN AN ORGANIZED HEALTH CARE EDUCATION/TRAINING PROGRAM

## 2023-12-19 PROCEDURE — 4410588 MR-BRAIN-W/O

## 2023-12-19 PROCEDURE — 99232 SBSQ HOSP IP/OBS MODERATE 35: CPT | Performed by: STUDENT IN AN ORGANIZED HEALTH CARE EDUCATION/TRAINING PROGRAM

## 2023-12-19 PROCEDURE — 70544 MR ANGIOGRAPHY HEAD W/O DYE: CPT

## 2023-12-19 PROCEDURE — 770020 HCHG ROOM/CARE - TELE (206)

## 2023-12-19 PROCEDURE — 700111 HCHG RX REV CODE 636 W/ 250 OVERRIDE (IP): Mod: JZ | Performed by: STUDENT IN AN ORGANIZED HEALTH CARE EDUCATION/TRAINING PROGRAM

## 2023-12-19 PROCEDURE — 700111 HCHG RX REV CODE 636 W/ 250 OVERRIDE (IP): Mod: JZ | Performed by: INTERNAL MEDICINE

## 2023-12-19 PROCEDURE — 700101 HCHG RX REV CODE 250: Performed by: STUDENT IN AN ORGANIZED HEALTH CARE EDUCATION/TRAINING PROGRAM

## 2023-12-19 PROCEDURE — A9270 NON-COVERED ITEM OR SERVICE: HCPCS | Performed by: INTERNAL MEDICINE

## 2023-12-19 RX ORDER — LIDOCAINE HYDROCHLORIDE 20 MG/ML
INJECTION, SOLUTION EPIDURAL; INFILTRATION; INTRACAUDAL; PERINEURAL PRN
Status: DISCONTINUED | OUTPATIENT
Start: 2023-12-19 | End: 2023-12-19 | Stop reason: SURG

## 2023-12-19 RX ORDER — EPHEDRINE SULFATE 50 MG/ML
INJECTION, SOLUTION INTRAVENOUS PRN
Status: DISCONTINUED | OUTPATIENT
Start: 2023-12-19 | End: 2023-12-19 | Stop reason: SURG

## 2023-12-19 RX ORDER — SODIUM CHLORIDE, SODIUM LACTATE, POTASSIUM CHLORIDE, CALCIUM CHLORIDE 600; 310; 30; 20 MG/100ML; MG/100ML; MG/100ML; MG/100ML
INJECTION, SOLUTION INTRAVENOUS CONTINUOUS
Status: DISCONTINUED | OUTPATIENT
Start: 2023-12-19 | End: 2023-12-19 | Stop reason: HOSPADM

## 2023-12-19 RX ORDER — SODIUM CHLORIDE, SODIUM LACTATE, POTASSIUM CHLORIDE, CALCIUM CHLORIDE 600; 310; 30; 20 MG/100ML; MG/100ML; MG/100ML; MG/100ML
INJECTION, SOLUTION INTRAVENOUS
Status: DISCONTINUED | OUTPATIENT
Start: 2023-12-19 | End: 2023-12-19 | Stop reason: SURG

## 2023-12-19 RX ORDER — HALOPERIDOL 5 MG/ML
1 INJECTION INTRAMUSCULAR
Status: DISCONTINUED | OUTPATIENT
Start: 2023-12-19 | End: 2023-12-19 | Stop reason: HOSPADM

## 2023-12-19 RX ORDER — DIPHENHYDRAMINE HYDROCHLORIDE 50 MG/ML
12.5 INJECTION INTRAMUSCULAR; INTRAVENOUS
Status: DISCONTINUED | OUTPATIENT
Start: 2023-12-19 | End: 2023-12-19 | Stop reason: HOSPADM

## 2023-12-19 RX ORDER — MORPHINE SULFATE 4 MG/ML
1 INJECTION INTRAVENOUS ONCE
Status: COMPLETED | OUTPATIENT
Start: 2023-12-19 | End: 2023-12-19

## 2023-12-19 RX ORDER — LIDOCAINE HYDROCHLORIDE 20 MG/ML
INJECTION, SOLUTION EPIDURAL; INFILTRATION; INTRACAUDAL; PERINEURAL PRN
Status: DISCONTINUED | OUTPATIENT
Start: 2023-12-19 | End: 2023-12-19

## 2023-12-19 RX ORDER — PHENYLEPHRINE HYDROCHLORIDE 10 MG/ML
INJECTION, SOLUTION INTRAMUSCULAR; INTRAVENOUS; SUBCUTANEOUS PRN
Status: DISCONTINUED | OUTPATIENT
Start: 2023-12-19 | End: 2023-12-19 | Stop reason: SURG

## 2023-12-19 RX ORDER — ONDANSETRON 2 MG/ML
4 INJECTION INTRAMUSCULAR; INTRAVENOUS
Status: DISCONTINUED | OUTPATIENT
Start: 2023-12-19 | End: 2023-12-19 | Stop reason: HOSPADM

## 2023-12-19 RX ADMIN — PHENYLEPHRINE HYDROCHLORIDE 100 MCG: 10 INJECTION INTRAVENOUS at 14:15

## 2023-12-19 RX ADMIN — MORPHINE SULFATE 1 MG: 4 INJECTION, SOLUTION INTRAMUSCULAR; INTRAVENOUS at 08:45

## 2023-12-19 RX ADMIN — EPHEDRINE SULFATE 30 MG: 50 INJECTION, SOLUTION INTRAVENOUS at 13:58

## 2023-12-19 RX ADMIN — APIXABAN 5 MG: 5 TABLET, FILM COATED ORAL at 17:42

## 2023-12-19 RX ADMIN — SODIUM CHLORIDE, POTASSIUM CHLORIDE, SODIUM LACTATE AND CALCIUM CHLORIDE: 600; 310; 30; 20 INJECTION, SOLUTION INTRAVENOUS at 13:25

## 2023-12-19 RX ADMIN — PROPOFOL 200 MG: 10 INJECTION, EMULSION INTRAVENOUS at 13:35

## 2023-12-19 RX ADMIN — AMLODIPINE BESYLATE 10 MG: 5 TABLET ORAL at 04:34

## 2023-12-19 RX ADMIN — ACETAMINOPHEN 1000 MG: 500 TABLET, FILM COATED ORAL at 20:21

## 2023-12-19 RX ADMIN — DIAZEPAM 5 MG: 5 INJECTION, SOLUTION INTRAMUSCULAR; INTRAVENOUS at 08:05

## 2023-12-19 RX ADMIN — AMOXICILLIN AND CLAVULANATE POTASSIUM 1 TABLET: 875; 125 TABLET, FILM COATED ORAL at 17:41

## 2023-12-19 RX ADMIN — ACETAMINOPHEN 1000 MG: 500 TABLET, FILM COATED ORAL at 07:21

## 2023-12-19 RX ADMIN — FUROSEMIDE 40 MG: 40 TABLET ORAL at 04:34

## 2023-12-19 RX ADMIN — APIXABAN 5 MG: 5 TABLET, FILM COATED ORAL at 04:32

## 2023-12-19 RX ADMIN — ATORVASTATIN CALCIUM 80 MG: 80 TABLET, FILM COATED ORAL at 04:34

## 2023-12-19 RX ADMIN — LISINOPRIL 40 MG: 20 TABLET ORAL at 04:33

## 2023-12-19 RX ADMIN — OMEPRAZOLE 40 MG: 20 CAPSULE, DELAYED RELEASE ORAL at 04:33

## 2023-12-19 RX ADMIN — CARVEDILOL 25 MG: 6.25 TABLET, FILM COATED ORAL at 17:42

## 2023-12-19 RX ADMIN — OXYCODONE 5 MG: 5 TABLET ORAL at 22:17

## 2023-12-19 RX ADMIN — TRAZODONE HYDROCHLORIDE 300 MG: 150 TABLET ORAL at 20:21

## 2023-12-19 RX ADMIN — OXYCODONE 5 MG: 5 TABLET ORAL at 09:48

## 2023-12-19 RX ADMIN — OXYCODONE 5 MG: 5 TABLET ORAL at 04:44

## 2023-12-19 RX ADMIN — LIDOCAINE HYDROCHLORIDE 100 MG: 20 INJECTION, SOLUTION EPIDURAL; INFILTRATION; INTRACAUDAL at 13:35

## 2023-12-19 RX ADMIN — PRAZOSIN HYDROCHLORIDE 2 MG: 2 CAPSULE ORAL at 20:21

## 2023-12-19 RX ADMIN — CARVEDILOL 25 MG: 6.25 TABLET, FILM COATED ORAL at 04:34

## 2023-12-19 RX ADMIN — BUSPIRONE HYDROCHLORIDE 7.5 MG: 5 TABLET ORAL at 04:33

## 2023-12-19 RX ADMIN — ISOSORBIDE MONONITRATE 120 MG: 60 TABLET, EXTENDED RELEASE ORAL at 04:33

## 2023-12-19 RX ADMIN — CYCLOBENZAPRINE 10 MG: 10 TABLET, FILM COATED ORAL at 07:25

## 2023-12-19 RX ADMIN — BUSPIRONE HYDROCHLORIDE 7.5 MG: 5 TABLET ORAL at 17:41

## 2023-12-19 RX ADMIN — ASPIRIN 81 MG: 81 TABLET, COATED ORAL at 04:34

## 2023-12-19 RX ADMIN — EPHEDRINE SULFATE 20 MG: 50 INJECTION, SOLUTION INTRAVENOUS at 13:54

## 2023-12-19 RX ADMIN — AMOXICILLIN AND CLAVULANATE POTASSIUM 1 TABLET: 875; 125 TABLET, FILM COATED ORAL at 04:33

## 2023-12-19 RX ADMIN — PHENYLEPHRINE HYDROCHLORIDE 100 MCG: 10 INJECTION INTRAVENOUS at 14:21

## 2023-12-19 RX ADMIN — OXYCODONE 5 MG: 5 TABLET ORAL at 17:41

## 2023-12-19 RX ADMIN — LIDOCAINE 1 PATCH: 4 PATCH TOPICAL at 20:20

## 2023-12-19 ASSESSMENT — ENCOUNTER SYMPTOMS
DIZZINESS: 1
BLURRED VISION: 1
VOMITING: 0
DEPRESSION: 0
FALLS: 1
CHILLS: 0
FEVER: 0
NECK PAIN: 1
NAUSEA: 0
LOSS OF CONSCIOUSNESS: 1
HEADACHES: 1
NERVOUS/ANXIOUS: 0
EYE PAIN: 0

## 2023-12-19 ASSESSMENT — FIBROSIS 4 INDEX: FIB4 SCORE: 0.96

## 2023-12-19 ASSESSMENT — PAIN DESCRIPTION - PAIN TYPE
TYPE: ACUTE PAIN
TYPE: ACUTE PAIN
TYPE: ACUTE PAIN;CHRONIC PAIN
TYPE: ACUTE PAIN
TYPE: ACUTE PAIN;CHRONIC PAIN

## 2023-12-19 ASSESSMENT — PAIN SCALES - GENERAL: PAIN_LEVEL: 0

## 2023-12-19 NOTE — ANESTHESIA PREPROCEDURE EVALUATION
Date/Time: 12/19/23 1245    Procedure: MR-BRAIN-W/O    Diagnosis:             Headache [R51.9]      Acute intractable headache, unspecified headache type [R51.9]            Headache [R51.9]      Acute intractable headache, unspecified headache type [R51.9]    Indications: headache    Location: [facility] - MRI - [facility]            Relevant Problems   NEURO   (positive) Acute CVA (cerebrovascular accident) ([facility])   (positive) Acute intractable tension-type headache      CARDIAC   (positive) Coronary artery disease involving native coronary artery of native heart with refractory angina pectoris (HCC)   (positive) MI (myocardial infarction) ([facility])   (positive) Uncontrolled hypertension      GI   (positive) Gastroesophageal reflux disease with esophagitis without hemorrhage         (positive) ARF (acute renal failure) ([facility])   (positive) Nephrolithiasis   (positive) Stage 2 chronic kidney disease       Physical Exam    Airway   Mallampati: III  TM distance: >3 FB  Neck ROM: full       Cardiovascular - normal exam  Rhythm: regular  Rate: normal  (-) murmur     Dental - normal exam      Very poor dentition     Pulmonary - normal exam  Breath sounds clear to auscultation     Abdominal    Neurological - normal exam                   Anesthesia Plan    ASA 3   ASA physical status 3 criteria: CVA or TIA - history (> 3 months) and CAD/stents (> 3 months)    Plan - general       Airway plan will be LMA          Induction: intravenous    Postoperative Plan: Postoperative administration of opioids is intended.    Pertinent diagnostic labs and testing reviewed    Informed Consent:    Anesthetic plan and risks discussed with patient.    Use of blood products discussed with: patient whom consented to blood products.

## 2023-12-19 NOTE — ASSESSMENT & PLAN NOTE
Prior C-spine MRI demonstrated foraminal stenosis suggestive of radiculopathy  RUE weakness without atrophy  OT no needs  Referral to spine surgery at discharge

## 2023-12-19 NOTE — ANESTHESIA TIME REPORT
Anesthesia Start and Stop Event Times       Date Time Event    12/19/2023 1315 Ready for Procedure     1325 Anesthesia Start     1442 Anesthesia Stop          Responsible Staff  12/19/23      Name Role Begin End    West Singh M.D. Anesth 1325 1442          Overtime Reason:  no overtime (within assigned shift)    Comments:

## 2023-12-19 NOTE — ASSESSMENT & PLAN NOTE
APAP, warm compress, lidoderm  If headache or cervical neuralgia refractory to these, no contraindication to dexamethasone   Hailee Singh is a 65 

## 2023-12-19 NOTE — PROGRESS NOTES
Hospital Medicine Daily Progress Note    Date of Service  12/19/2023    Chief Complaint  Divine Lugo is a 53 y.o. male with HTN, CAD with stable angina s/p MAYRA x5, h/o CVA, anxiety, CKD2 admitted 12/17/2023 with headache.    Hospital Course    He reports severe 10/10 constant headache for the past 2 days starting from the back of his neck and spreading like electricity into the front of his forehead.  It is constant and not relieved by the percocet he used to take for joing pain nor his wife's tramadol.  When the pain is intense he has blurry vision.  Denies associated N/V.  He reports he will sometimes black out without prodrome. It seems to happen when he gets up or walks around.  He denies recent F/C.  He has been taking his apixaban only once each day on most days.  Unable to obtain MRI/MRV today.  He reports intermittent chest pain which is not triggered by activity  Troponin decreased from 62 to 55.  EKG old infarct with V1-V3 qwaves and nonspecific J-point depressions in lateral leads.  PT recommends outpatient PT.  OT no needs.  He has moved back to [place] and will establish with PCP Leti Bartlett.  Advised that his prior cervical imaging and examination suggests radiculopathy. He will be referred to Spinal Surgery as an outpatient.  He will be referred back to [facility].    CMP reviewed, normal.    Interval Problem Update  Evaluated at bedside  Reporting 8/10 headache and neck pain today  Reports neck pain is chronic in nature and has been having it for a year  He recently moved from [place] and does not have outpatient follow-up in [place]  Unable to do MRI due to anxiety/claustrophobia  Adjust pain management  Pending Brain MRI and MRV head with anesthesia    I have discussed this patient's plan of care and discharge plan at IDT rounds today with Case Management, Nursing, Nursing leadership, and other members of the IDT team.    Consultants/Specialty  None    Code Status  Full  Code    Disposition  The patient is not medically cleared for discharge to home or a post-acute facility.  Anticipate discharge to: home with close outpatient follow-up    I have placed the appropriate orders for post-discharge needs.    Review of Systems  Review of Systems   Constitutional:  Negative for chills and fever.   Eyes:  Positive for blurred vision. Negative for pain.   Gastrointestinal:  Negative for nausea and vomiting.   Musculoskeletal:  Positive for falls, joint pain and neck pain.   Neurological:  Positive for dizziness, loss of consciousness and headaches.   Psychiatric/Behavioral:  Negative for depression. The patient is not nervous/anxious.         Physical Exam  Temp:  [36.4 °C (97.5 °F)-36.6 °C (97.9 °F)] 36.4 °C (97.5 °F)  Pulse:  [62-78] 72  Resp:  [16-18] 18  BP: (107-136)/(64-79) 136/79  SpO2:  [93 %-96 %] 95 %    Physical Exam  Vitals and nursing note reviewed.   Constitutional:       General: He is not in acute distress.     Appearance: He is obese. He is not ill-appearing, toxic-appearing or diaphoretic.   HENT:      Head: Normocephalic.      Nose: Nose normal.      Mouth/Throat:      Mouth: Mucous membranes are dry.   Eyes:      General: No scleral icterus.     Conjunctiva/sclera: Conjunctivae normal.      Pupils: Pupils are equal, round, and reactive to light.   Cardiovascular:      Rate and Rhythm: Normal rate and regular rhythm.      Pulses: Normal pulses.      Heart sounds: Normal heart sounds. No murmur heard.     No friction rub. No gallop.   Pulmonary:      Effort: Pulmonary effort is normal. No respiratory distress.      Breath sounds: Normal breath sounds. No wheezing, rhonchi or rales.   Abdominal:      General: Abdomen is flat. Bowel sounds are normal. There is no distension.      Palpations: Abdomen is soft.      Tenderness: There is no abdominal tenderness. There is no guarding or rebound.   Genitourinary:     Comments: No ham  Musculoskeletal:      Cervical back: Neck  supple.      Right lower leg: No edema.      Left lower leg: No edema.   Skin:     General: Skin is warm and dry.   Neurological:      Mental Status: He is alert.      Cranial Nerves: Cranial nerves 2-12 are intact. No cranial nerve deficit, dysarthria or facial asymmetry.      Motor: Weakness (4/5 RUE , bicep flexion, and tricep flexion, 5/5 LUE and BLE) present.      Gait: Gait normal.   Psychiatric:         Attention and Perception: Attention and perception normal.         Mood and Affect: Mood and affect normal.         Speech: Speech normal.         Behavior: Behavior is cooperative.         Thought Content: Thought content normal.         Cognition and Memory: Cognition and memory normal.         Judgment: Judgment is inappropriate.         Fluids    Intake/Output Summary (Last 24 hours) at 12/19/2023 1352  Last data filed at 12/19/2023 1123  Gross per 24 hour   Intake --   Output 1575 ml   Net -1575 ml       Laboratory  Recent Labs     12/18/23  0140   WBC 10.1   RBC 5.51   HEMOGLOBIN 16.9   HEMATOCRIT 48.9   MCV 88.7   MCH 30.7   MCHC 34.6   RDW 47.2   PLATELETCT 178   MPV 10.2     Recent Labs     12/18/23  0434   SODIUM 138   POTASSIUM 4.7   CHLORIDE 104   CO2 23   GLUCOSE 120*   BUN 18   CREATININE 1.13   CALCIUM 8.6                   Imaging  OUTSIDE IMAGES-DX CHEST   Final Result      OUTSIDE IMAGES-CT HEAD   Final Result      MR-BRAIN-W/O    (Results Pending)   MR-VENOGRAM (MRV) HEAD    (Results Pending)        Assessment/Plan  * Acute intractable tension-type headache- (present on admission)  Assessment & Plan  Presented hypertensive with headache for 2 days with some blurry vision, with no new neurodeficit beside right-sided weakness  Likely occipital neuralgia from prior c-spine trauma and fusion  DDX included recurrent dural sinus thrombosis due to noncompliance with apixaban  MRI and MRV ordered due to high ASCVD risk and h/o venous thrombosis  Abortive cocktail - IV NS + IV Mg + IV compazine +  IV benadryl  Minimize opiates  Toradol deferred due to RACHEL and CKD2  If refractory to cocktail, would try 8 mg IV dexamethasone x1      Gastroesophageal reflux disease with esophagitis without hemorrhage- (present on admission)  Assessment & Plan  Continue omeprazole    Cervical neuralgia- (present on admission)  Assessment & Plan  APAP, warm compress, lidoderm  If headache or cervical neuralgia refractory to these, no contraindication to dexamethasone    Stage 2 chronic kidney disease- (present on admission)  Assessment & Plan  Likely due to microvascular disease from HTN  Avoid nephrotoxins    Foraminal stenosis of cervical region- (present on admission)  Assessment & Plan  Prior C-spine MRI demonstrated foraminal stenosis suggestive of radiculopathy  RUE weakness without atrophy  OT no needs  Referral to spine surgery at discharge      Pain, dental- (present on admission)  Assessment & Plan  P.o. Augmentin started  Follow-up with dentist as outpatient    Elevated troponin- (present on admission)  Assessment & Plan  Peaked at 62  Likely ongoing demand ischemia from known CAD s/p 5 MAYAR  EKG not indicative of acute ischemia  Telemetry    History of syncope- (present on admission)  Assessment & Plan  Ambulated with PT no prodrome, no orthostasis  TTEs previously negative for significant valvular disease  Likely due to uncontrolled pain or psychogenic      History of posttraumatic stress disorder (PTSD)- (present on admission)  Assessment & Plan  Continue trazodone, prazosin, and buspirone    Coronary artery disease involving native coronary artery of native heart with refractory angina pectoris (HCC)- (present on admission)  Assessment & Plan  Continue Eliquis, aspirin, lisinopril, carvedilol, Lipitor, isosorbide  Referral back to [facility]    Uncontrolled hypertension- (present on admission)  Assessment & Plan  Likely acute pain superimposed on chronic essential HTN  Continue PTA antihypertensives  At  risk of hypoperfusion and neurologic injury with rapid BP shifts, PRN antihypertensives discontinued         VTE prophylaxis: therapeutic anticoagulation with eliquis 5 mg BID     I have performed a physical exam and reviewed and updated ROS and Plan today (12/19/2023). In review of yesterday's note (12/18/2023), there are no changes except as documented above.

## 2023-12-19 NOTE — PROGRESS NOTES
Hospital Medicine Daily Progress Note    Date of Service  12/18/2023    Chief Complaint  Divine Lugo is a 53 y.o. male with HTN, CAD with stable angina s/p MAYRA x5, h/o CVA, anxiety, CKD2 admitted 12/17/2023 with headache.    Hospital Course  No notes on file    Interval Problem Update    He reports severe 10/10 constant headache for the past 2 days starting from the back of his neck and spreading like electricity into the front of his forehead.  It is constant and not relieved by the percocet he used to take for joing pain nor his wife's tramadol.  When the pain is intense he has blurry vision.  Denies associated N/V.  He reports he will sometimes black out without prodrome. It seems to happen when he gets up or walks around.  He denies recent F/C.  He has been taking his apixaban only once each day on most days.  Unable to obtain MRI/MRV today.  He reports intermittent chest pain which is not triggered by activity  Troponin decreased from 62 to 55.  EKG old infarct with V1-V3 qwaves and nonspecific J-point depressions in lateral leads.  PT recommends outpatient PT.  OT no needs.  He has moved back to [place] and will establish with PCP Leti Bartlett.  Advised that his prior cervical imaging and examination suggests radiculopathy. He will be referred to Spinal Surgery as an outpatient.  He will be referred back to [facility].    CMP reviewed, normal.    I have discussed this patient's plan of care and discharge plan at IDT rounds today with Case Management, Nursing, Nursing leadership, and other members of the IDT team.    Consultants/Specialty  None    Code Status  Full Code    Disposition  The patient is not medically cleared for discharge to home or a post-acute facility.  Anticipate discharge to: home with close outpatient follow-up    I have placed the appropriate orders for post-discharge needs.    Review of Systems  Review of Systems   Constitutional:  Negative for chills and fever.    Eyes:  Positive for blurred vision. Negative for pain.   Gastrointestinal:  Negative for nausea and vomiting.   Musculoskeletal:  Positive for falls, joint pain and neck pain.   Neurological:  Positive for dizziness, loss of consciousness and headaches.   Psychiatric/Behavioral:  Negative for depression. The patient is not nervous/anxious.         Physical Exam  Temp:  [36.5 °C (97.7 °F)-36.6 °C (97.9 °F)] 36.6 °C (97.9 °F)  Pulse:  [69-80] 72  Resp:  [16-18] 17  BP: ()/() 108/66  SpO2:  [90 %-94 %] 90 %    Physical Exam  Vitals and nursing note reviewed.   Constitutional:       General: He is not in acute distress.     Appearance: He is obese. He is not ill-appearing, toxic-appearing or diaphoretic.   HENT:      Head: Normocephalic.      Nose: Nose normal.      Mouth/Throat:      Mouth: Mucous membranes are dry.   Eyes:      General: No scleral icterus.     Conjunctiva/sclera: Conjunctivae normal.      Pupils: Pupils are equal, round, and reactive to light.   Cardiovascular:      Rate and Rhythm: Normal rate and regular rhythm.      Pulses: Normal pulses.      Heart sounds: Normal heart sounds. No murmur heard.     No friction rub. No gallop.   Pulmonary:      Effort: Pulmonary effort is normal. No respiratory distress.      Breath sounds: Normal breath sounds. No wheezing, rhonchi or rales.   Abdominal:      General: Abdomen is flat. Bowel sounds are normal. There is no distension.      Palpations: Abdomen is soft.      Tenderness: There is no abdominal tenderness. There is no guarding or rebound.   Genitourinary:     Comments: No ham  Musculoskeletal:      Cervical back: Neck supple.      Right lower leg: No edema.      Left lower leg: No edema.   Skin:     General: Skin is warm and dry.   Neurological:      Mental Status: He is alert.      Cranial Nerves: Cranial nerves 2-12 are intact. No cranial nerve deficit, dysarthria or facial asymmetry.      Motor: Weakness (4/5 RUE , bicep flexion,  and tricep flexion, 5/5 LUE and BLE) present.      Gait: Gait normal.   Psychiatric:         Attention and Perception: Attention and perception normal.         Mood and Affect: Mood and affect normal.         Speech: Speech normal.         Behavior: Behavior is cooperative.         Thought Content: Thought content normal.         Cognition and Memory: Cognition and memory normal.         Judgment: Judgment is inappropriate.         Fluids    Intake/Output Summary (Last 24 hours) at 12/18/2023 1737  Last data filed at 12/18/2023 1000  Gross per 24 hour   Intake --   Output 900 ml   Net -900 ml       Laboratory  Recent Labs     12/18/23  0140   WBC 10.1   RBC 5.51   HEMOGLOBIN 16.9   HEMATOCRIT 48.9   MCV 88.7   MCH 30.7   MCHC 34.6   RDW 47.2   PLATELETCT 178   MPV 10.2     Recent Labs     12/18/23  0434   SODIUM 138   POTASSIUM 4.7   CHLORIDE 104   CO2 23   GLUCOSE 120*   BUN 18   CREATININE 1.13   CALCIUM 8.6                   Imaging  OUTSIDE IMAGES-DX CHEST   Final Result      OUTSIDE IMAGES-CT HEAD   Final Result      MR-BRAIN-W/O    (Results Pending)   MR-VENOGRAM (MRV) HEAD    (Results Pending)        Assessment/Plan  * Acute intractable tension-type headache- (present on admission)  Assessment & Plan  Presented hypertensive with headache for 2 days with some blurry vision, with no new neurodeficit beside right-sided weakness  Likely occipital neuralgia from prior c-spine trauma and fusion  DDX included recurrent dural sinus thrombosis due to noncompliance with apixaban  MRI and MRV ordered due to high ASCVD risk and h/o venous thrombosis  Abortive cocktail - IV NS + IV Mg + IV compazine + IV benadryl  Minimize opiates  Toradol deferred due to RACHEL and CKD2  If refractory to cocktail, would try 8 mg IV dexamethasone x1      Gastroesophageal reflux disease with esophagitis without hemorrhage- (present on admission)  Assessment & Plan  Continue omeprazole    Cervical neuralgia- (present on admission)  Assessment  & Plan  APAP, warm compress, lidoderm  If headache or cervical neuralgia refractory to these, no contraindication to dexamethasone    Stage 2 chronic kidney disease- (present on admission)  Assessment & Plan  Likely due to microvascular disease from HTN  Avoid nephrotoxins    Foraminal stenosis of cervical region- (present on admission)  Assessment & Plan  Prior C-spine MRI demonstrated foraminal stenosis suggestive of radiculopathy  RUE weakness without atrophy  OT no needs  Referral to spine surgery at discharge      Pain, dental- (present on admission)  Assessment & Plan  P.o. Augmentin started  Follow-up with dentist as outpatient    Elevated troponin- (present on admission)  Assessment & Plan  Peaked at 62  Likely ongoing demand ischemia from known CAD s/p 5 MAYRA  EKG not indicative of acute ischemia  Telemetry    History of syncope- (present on admission)  Assessment & Plan  Ambulated with PT no prodrome, no orthostasis  TTEs previously negative for significant valvular disease  Likely due to uncontrolled pain or psychogenic      History of posttraumatic stress disorder (PTSD)- (present on admission)  Assessment & Plan  Continue trazodone, prazosin, and buspirone    Coronary artery disease involving native coronary artery of native heart with refractory angina pectoris (HCC)- (present on admission)  Assessment & Plan  Continue Eliquis, aspirin, lisinopril, carvedilol, Lipitor, isosorbide  Referral back to [facility]    Uncontrolled hypertension- (present on admission)  Assessment & Plan  Likely acute pain superimposed on chronic essential HTN  Continue PTA antihypertensives  At risk of hypoperfusion and neurologic injury with rapid BP shifts, PRN antihypertensives discontinued         VTE prophylaxis:    therapeutic anticoagulation with eliquis 5 mg BID      I have performed a physical exam and reviewed and updated ROS and Plan today (12/18/2023). In review of yesterday's note (12/17/2023), there  are no changes except as documented above.

## 2023-12-19 NOTE — ASSESSMENT & PLAN NOTE
Continue Eliquis, aspirin, lisinopril, carvedilol, Lipitor, isosorbide  Referral back to [facility]

## 2023-12-19 NOTE — ASSESSMENT & PLAN NOTE
Ambulated with PT no prodrome, no orthostasis  TTEs previously negative for significant valvular disease  Likely due to uncontrolled pain or psychogenic

## 2023-12-19 NOTE — ANESTHESIA POSTPROCEDURE EVALUATION
Patient: Divine Lugo    Procedure Summary       Date: 12/19/23 Room / Location: [facility]    Anesthesia Start: 1325 Anesthesia Stop: 1442    Procedure: MR-BRAIN-W/O Diagnosis:             Headache      Acute intractable headache, unspecified headache type            Headache      Acute intractable headache, unspecified headache type      (headache)    Scheduled Providers:  Responsible Provider: West Singh M.D.    Anesthesia Type: general ASA Status: 3            Final Anesthesia Type: general  Last vitals  BP   Blood Pressure: 136/79    Temp   36.4 °C (97.5 °F)    Pulse   72   Resp   18    SpO2   95 %      Anesthesia Post Evaluation    Patient location during evaluation: PACU  Patient participation: complete - patient participated  Level of consciousness: awake and alert  Pain score: 0    Airway patency: patent  Anesthetic complications: no  Cardiovascular status: hemodynamically stable  Respiratory status: acceptable  Hydration status: euvolemic    PONV: none          No notable events documented.     Nurse Pain Score: 8 (NPRS)

## 2023-12-19 NOTE — PROGRESS NOTES
12/12/2018 9:45 AM 
 
Mr. Escamilla Speaks [id number] [facility] [phone] [id number] To Whom it may concern, 
   Pt is in my office today. Please excuse from work.  Thank Jhonatan Syed MD 
     
 
 
 
 Monitor summary: SR 66-79, UT .15, QRS .08, QT .46, per strip from monitor room.

## 2023-12-19 NOTE — CARE PLAN
The patient is Stable - Low risk of patient condition declining or worsening    Shift Goals  Clinical Goals: Monitor neuro/pain control  Patient Goals: pain control  Family Goals: leigh ann    Progress made toward(s) clinical / shift goals:  patient expressed relief form cocktail of medications given for his headache. No acute change noted. Hourly rounds to ensure safety and comfort. Call bell within reach.    Patient is not progressing towards the following goals:

## 2023-12-19 NOTE — PROGRESS NOTES
Patient to MRI inpatient dept. Patient informed process and plan of care during this visit.  Anesthesiologist Samantha spoke with Patient and discussed provider's plan of care.  Consent obtained.  MRI completed without incident.  Report given to Marlys from [facility] PACU. Patient transported to [facility] PACU in stable condition.

## 2023-12-19 NOTE — PROGRESS NOTES
Pt complaining of headache 10/10. That is causing vision changes and double vision. Dr. Colin notified. MRI pending for tonight.     Dr. Colin ordering new medications for pain.

## 2023-12-19 NOTE — ANESTHESIA PROCEDURE NOTES
Airway    Date/Time: 12/19/2023 1:38 PM    Performed by: West Singh M.D.  Authorized by: West Singh M.D.    Location:  OR  Urgency:  Elective  Difficult Airway: No    Indications for Airway Management:  Anesthesia      Spontaneous Ventilation: absent    Sedation Level:  Deep  Preoxygenated: Yes    Mask Difficulty Assessment:  1 - vent by mask  Final Airway Type:  Supraglottic airway  Final Supraglottic Airway:  Standard LMA    SGA Size:  4  Number of Attempts at Approach:  1

## 2023-12-20 VITALS
RESPIRATION RATE: 18 BRPM | TEMPERATURE: 97.9 F | HEART RATE: 82 BPM | SYSTOLIC BLOOD PRESSURE: 125 MMHG | WEIGHT: 227.96 LBS | BODY MASS INDEX: 34.55 KG/M2 | HEIGHT: 68 IN | OXYGEN SATURATION: 90 % | DIASTOLIC BLOOD PRESSURE: 81 MMHG

## 2023-12-20 DIAGNOSIS — M54.2 CHRONIC NECK PAIN: ICD-10-CM

## 2023-12-20 DIAGNOSIS — G89.29 CHRONIC NECK PAIN: ICD-10-CM

## 2023-12-20 PROCEDURE — 99239 HOSP IP/OBS DSCHRG MGMT >30: CPT | Performed by: STUDENT IN AN ORGANIZED HEALTH CARE EDUCATION/TRAINING PROGRAM

## 2023-12-20 PROCEDURE — A9270 NON-COVERED ITEM OR SERVICE: HCPCS | Performed by: INTERNAL MEDICINE

## 2023-12-20 PROCEDURE — 700102 HCHG RX REV CODE 250 W/ 637 OVERRIDE(OP)

## 2023-12-20 PROCEDURE — A9270 NON-COVERED ITEM OR SERVICE: HCPCS

## 2023-12-20 PROCEDURE — 700102 HCHG RX REV CODE 250 W/ 637 OVERRIDE(OP): Performed by: INTERNAL MEDICINE

## 2023-12-20 RX ORDER — OXYCODONE HYDROCHLORIDE AND ACETAMINOPHEN 5; 325 MG/1; MG/1
1 TABLET ORAL EVERY 6 HOURS PRN
Qty: 12 TABLET | Refills: 0 | Status: SHIPPED | OUTPATIENT
Start: 2023-12-20 | End: 2023-12-23

## 2023-12-20 RX ORDER — BUSPIRONE HYDROCHLORIDE 7.5 MG/1
7.5 TABLET ORAL 2 TIMES DAILY
Qty: 90 TABLET | Refills: 0 | Status: ON HOLD
Start: 2023-12-20 | End: 2024-02-25

## 2023-12-20 RX ORDER — NICOTINE 21 MG/24HR
21 PATCH, TRANSDERMAL 24 HOURS TRANSDERMAL
Status: DISCONTINUED | OUTPATIENT
Start: 2023-12-20 | End: 2023-12-20 | Stop reason: HOSPADM

## 2023-12-20 RX ORDER — BUTALBITAL, ACETAMINOPHEN AND CAFFEINE 50; 325; 40 MG/1; MG/1; MG/1
1 TABLET ORAL EVERY 6 HOURS PRN
Status: DISCONTINUED | OUTPATIENT
Start: 2023-12-20 | End: 2023-12-20 | Stop reason: HOSPADM

## 2023-12-20 RX ORDER — OXYCODONE HYDROCHLORIDE 5 MG/1
5 TABLET ORAL
Status: DISCONTINUED | OUTPATIENT
Start: 2023-12-20 | End: 2023-12-20 | Stop reason: HOSPADM

## 2023-12-20 RX ORDER — ACETAMINOPHEN 500 MG
1000 TABLET ORAL 3 TIMES DAILY PRN
Qty: 180 TABLET | Refills: 0 | Status: SHIPPED | OUTPATIENT
Start: 2023-12-20

## 2023-12-20 RX ORDER — OXYCODONE HYDROCHLORIDE 10 MG/1
10 TABLET ORAL
Status: DISCONTINUED | OUTPATIENT
Start: 2023-12-20 | End: 2023-12-20 | Stop reason: HOSPADM

## 2023-12-20 RX ADMIN — ACETAMINOPHEN 1000 MG: 500 TABLET, FILM COATED ORAL at 07:54

## 2023-12-20 RX ADMIN — AMOXICILLIN AND CLAVULANATE POTASSIUM 1 TABLET: 875; 125 TABLET, FILM COATED ORAL at 05:01

## 2023-12-20 RX ADMIN — OXYCODONE HYDROCHLORIDE 10 MG: 10 TABLET ORAL at 05:02

## 2023-12-20 RX ADMIN — FUROSEMIDE 40 MG: 40 TABLET ORAL at 05:02

## 2023-12-20 RX ADMIN — OXYCODONE HYDROCHLORIDE 10 MG: 10 TABLET ORAL at 08:00

## 2023-12-20 RX ADMIN — CARVEDILOL 25 MG: 6.25 TABLET, FILM COATED ORAL at 05:01

## 2023-12-20 RX ADMIN — OMEPRAZOLE 40 MG: 20 CAPSULE, DELAYED RELEASE ORAL at 05:02

## 2023-12-20 RX ADMIN — ISOSORBIDE MONONITRATE 120 MG: 60 TABLET, EXTENDED RELEASE ORAL at 05:02

## 2023-12-20 RX ADMIN — OXYCODONE HYDROCHLORIDE 10 MG: 10 TABLET ORAL at 11:00

## 2023-12-20 RX ADMIN — APIXABAN 5 MG: 5 TABLET, FILM COATED ORAL at 05:01

## 2023-12-20 RX ADMIN — LISINOPRIL 40 MG: 20 TABLET ORAL at 05:02

## 2023-12-20 RX ADMIN — BUTALBITAL, ACETAMINOPHEN AND CAFFEINE 1 TABLET: 325; 50; 40 TABLET ORAL at 01:03

## 2023-12-20 RX ADMIN — ASPIRIN 81 MG: 81 TABLET, COATED ORAL at 05:01

## 2023-12-20 RX ADMIN — AMLODIPINE BESYLATE 10 MG: 5 TABLET ORAL at 05:00

## 2023-12-20 RX ADMIN — CYCLOBENZAPRINE 10 MG: 10 TABLET, FILM COATED ORAL at 00:55

## 2023-12-20 RX ADMIN — NICOTINE TRANSDERMAL SYSTEM 21 MG: 21 PATCH, EXTENDED RELEASE TRANSDERMAL at 06:49

## 2023-12-20 RX ADMIN — BUSPIRONE HYDROCHLORIDE 7.5 MG: 5 TABLET ORAL at 05:01

## 2023-12-20 RX ADMIN — ATORVASTATIN CALCIUM 80 MG: 80 TABLET, FILM COATED ORAL at 05:01

## 2023-12-20 ASSESSMENT — PAIN DESCRIPTION - PAIN TYPE
TYPE: ACUTE PAIN
TYPE: CHRONIC PAIN;ACUTE PAIN
TYPE: ACUTE PAIN

## 2023-12-20 ASSESSMENT — FIBROSIS 4 INDEX: FIB4 SCORE: 0.96

## 2023-12-20 NOTE — DISCHARGE SUMMARY
Discharge Summary    CHIEF COMPLAINT ON ADMISSION  No chief complaint on file.      Reason for Admission  headache     Admission Date  12/17/2023    CODE STATUS  Full Code    HPI & HOSPITAL COURSE  53 y.o. male with past medical history of recent CVA (noted on brain MRI during 11/2023 hospital admission) with right-sided residual weakness, cerebral sinus rhythm venous thrombosis, chronic anticoagulation with Eliquis, CAD, and 5 stents placement presented to the emergency department with intractable headache and hypertensive urgency.  Patient was started on oral and IV antihypertensives with control of BP. No evidence of acute intracranial dural venous sinus thrombosis on Head MRV and repeat brain MRI consistent with previously noted frontalposterior left CVA.  Patient tolerating meals and marked improvement with headache.  Stable patient with chronic condition was discharged home and instructed to follow-up with his primary care provider within a week.  All results and plan of action discussed with the patient for she voiced understanding and agreement with the primary care team.  Patient was instructed to return to emergency department symptoms were to worsen.    Therefore, he is discharged in good and stable condition to home with close outpatient follow-up.    The patient met 2-midnight criteria for an inpatient stay at the time of discharge.    Discharge Date  12/20/2023    FOLLOW UP ITEMS POST DISCHARGE  Primary care provider follow posthospital discharge care    DISCHARGE DIAGNOSES  Principal Problem:    Acute intractable tension-type headache (POA: Yes)  Active Problems:    Uncontrolled hypertension (POA: Yes)    Coronary artery disease involving native coronary artery of native heart with refractory angina pectoris (HCC) (POA: Yes)    History of posttraumatic stress disorder (PTSD) (POA: Yes)    History of syncope (POA: Yes)    Elevated troponin (POA: Yes)    Pain, dental (POA: Yes)    Foraminal stenosis of  cervical region (POA: Yes)    Stage 2 chronic kidney disease (POA: Yes)    Cervical neuralgia (POA: Yes)    Gastroesophageal reflux disease with esophagitis without hemorrhage (POA: Yes)  Resolved Problems:    * No resolved hospital problems. *      FOLLOW UP  Future Appointments   Date Time Provider Department Center   12/26/2023 11:00 AM Antony Dye D.O. [facility] None   12/26/2023  1:20 PM TONYA [facility]   12/28/2023 10:00 AM Bettye Headley M.D. [facility] None   1/17/2024  1:40 PM David MOSCOSO M.D. [facility] None     [facility]  [address]  [address]  [facility] [address]  [phone]        [facility] CARDIOLOGY  [address]  [facility] [id number] [phone]          MEDICATIONS ON DISCHARGE     Medication List        START taking these medications        Instructions   acetaminophen 500 MG Tabs  Commonly known as: Tylenol   Take 2 Tablets by mouth 3 times a day as needed for Mild Pain.  Dose: 1,000 mg            CHANGE how you take these medications        Instructions   trazodone 300 MG tablet  What changed: Another medication with the same name was removed. Continue taking this medication, and follow the directions you see here.  Commonly known as: Desyrel   Take 300 mg by mouth every evening. Take with 200 mg (2 tabs of 100 mg) for total dose of 500 mg  Dose: 300 mg            CONTINUE taking these medications        Instructions   amLODIPine 10 MG Tabs  Commonly known as: Norvasc   Take 10 mg by mouth every day.  Dose: 10 mg     Aspirin Low Dose 81 MG EC tablet  Generic drug: aspirin   Take 1 Tablet by mouth every day for 30 days.  (Take 1 Tablet by mouth every day for 30 days.)  Dose: 81 mg     atorvastatin 80 MG tablet  Commonly known as: Lipitor   Take 1 Tablet by mouth every day.  Dose: 80 mg     busPIRone 7.5 MG tablet  Commonly known as: Buspar   Take 1 Tablet by mouth 2 times a day.  Dose: 7.5 mg     carvedilol 6.25 MG  Tabs  Commonly known as: Coreg   Take 6.25 mg by mouth 2 times a day.  Dose: 6.25 mg     cyclobenzaprine 10 mg Tabs  Commonly known as: Flexeril   Take 1 Tablet by mouth 3 times a day as needed for Muscle Spasms.  Dose: 1 Tablet     Eliquis 5mg Tabs  Generic drug: apixaban   Take 1 Tablet by mouth 2 times a day for 30 days.  Dose: 5 mg     EPINEPHrine 0.3 MG/0.3ML Soaj solution for injection  Commonly known as: Epipen   INJECT CONTENTS OF 1 PEN SUBCUTANEOUSLY AS NEEDED FOR ALLERGIC REACTION MAY REPEAT DOSE AFTER 5 TO 15 MINTUTES     furosemide 40 MG Tabs  Commonly known as: Lasix   Take 40 mg by mouth every day.  Dose: 40 mg     isosorbide mononitrate 120 MG CR tablet  Commonly known as: Imdur   Take 1 Tablet by mouth every morning for 30 days.  Dose: 120 mg     lisinopril 40 MG tablet  Commonly known as: Prinivil   Take 40 mg by mouth every day.  Dose: 40 mg     nitroglycerin 0.4 MG Subl  Commonly known as: Nitrostat   Place 1 Tablet under the tongue as needed for Chest Pain for up to 30 days. DISSOLVE ONE TABLET UNDER THE TONGUE EVERY 5 MINUTES AS NEEDED FOR CHEST PAIN DO NOT EXCEED A TOTAL OF 3 DOSES IN 15 MINUTES  (Place 1 Tablet under the tongue as needed for Chest Pain for up to 30 days. DISSOLVE ONE TABLET UNDER THE TONGUE EVERY 5 MINUTES AS NEEDED FOR CHEST PAIN DO NOT EXCEED A TOTAL OF 3 DOSES IN 15 MINUTES)  Dose: 0.4 mg     omeprazole 40 MG delayed-release capsule  Commonly known as: PriLOSEC   Take 40 mg by mouth every day.  Dose: 40 mg     ondansetron 4 MG Tbdp  Commonly known as: Zofran ODT   Take 1 Tablet by mouth every 8 hours as needed for Nausea/Vomiting.  Dose: 4 mg     oxyCODONE-acetaminophen 5-325 MG Tabs  Commonly known as: Percocet   Take 1 Tablet by mouth every 6 hours as needed for Moderate Pain or Severe Pain.  Dose: 1 Tablet     potassium chloride SA 20 MEQ Tbcr  Commonly known as: Kdur   Take 20 mEq by mouth every day.  Dose: 20 mEq     prazosin 2 MG Caps  Commonly known as: Minipress    Take 2 mg by mouth every evening.  Dose: 2 mg              Allergies  Allergies   Allergen Reactions    Gabapentin Unspecified     Causes patient to seizures    Bloodless      Pt states he would accept blood in life or death situation    Depakote [Valproic Acid] Unspecified     Non epileptic seizure ( stated by patient )       DIET  Orders Placed This Encounter   Procedures    Diet Order Diet: Regular     Standing Status:   Standing     Number of Occurrences:   1     Order Specific Question:   Diet:     Answer:   Regular [1]       ACTIVITY  As tolerated.  Weight bearing as tolerated    CONSULTATIONS  None    PROCEDURES  None    LABORATORY  Lab Results   Component Value Date    SODIUM 138 12/18/2023    POTASSIUM 4.7 12/18/2023    CHLORIDE 104 12/18/2023    CO2 23 12/18/2023    GLUCOSE 120 (H) 12/18/2023    BUN 18 12/18/2023    CREATININE 1.13 12/18/2023    GLOMRATE 49 (L) 11/03/2023        Lab Results   Component Value Date    WBC 10.1 12/18/2023    HEMOGLOBIN 16.9 12/18/2023    HEMATOCRIT 48.9 12/18/2023    PLATELETCT 178 12/18/2023        Total time of the discharge process exceeds 32 minutes.

## 2023-12-20 NOTE — PROGRESS NOTES
Pt dressing in scrub pants and top. PIV removed. Pt has phone, , and wallet.   Pt's ride on her way from [place].   Pt to [facility].

## 2023-12-20 NOTE — DISCHARGE INSTRUCTIONS
"Ischemic Stroke  Discharge Instructions    You experienced an Ischemic Stroke.  Ischemic stroke is the most common type of stroke and happens when an artery in the brain becomes blocked by a plaque fragment or blood clot. Typically, these blockages travel from the heart or larger arteries that supply the brain.  The brain needs a constant supply of blood, which carries oxygen and nutrients it needs to function.  A stroke occurs when one of these arteries to the brain is either blocked or bursts. As a result, part of the brain does not get the blood it needs, so it starts to die.       Stroke Risk Factors    You are at increased risk of having another stroke event.  See your Patient Stroke Guide to help reduce your stroke risk. These are your specific risk factors:  Alcohol or Drug Abuse  Gender - Men are at a higher risk than women  Heart disease  High blood pressure  High Cholesterol and lipids  Inactivity or Overweight / High BMI  Not taking your medications as prescribed  Previous TIAs or \"mini strokes\"  Smoking or Tobacco Use  Substance abuse     Get help right away if you have any signs of a stroke.  \"BE FAST\" is an easy way to remember the main warning signs of a stroke:  B - Balance. Dizziness, sudden trouble walking, or loss of balance.  E - Eyes. Trouble seeing or a change in how you see.  F - Face. Sudden weakness or loss of feeling in the face. The face or eyelid may droop on one side.  A - Arms. Weakness or loss of feeling in an arm. This happens all of a sudden and most often on one side of the body.  S - Speech. Sudden trouble speaking, slurred speech, or trouble understanding what people say.  T - Time. Time to call emergency services. Write down what time symptoms started.    "

## 2023-12-20 NOTE — CARE PLAN
The patient is Stable - Low risk of patient condition declining or worsening    Shift Goals  Clinical Goals: MRI, Discharge home  Patient Goals: Discharge home, MRI  Family Goals: leigh ann    Progress made toward(s) clinical / shift goals:    MRI completed  Pt still hoping to discharge home. MRI results are still pending.       Problem: Knowledge Deficit - Standard  Goal: Patient and family/care givers will demonstrate understanding of plan of care, disease process/condition, diagnostic tests and medications  Outcome: Progressing     Problem: Pain - Standard  Goal: Alleviation of pain or a reduction in pain to the patient’s comfort goal  Outcome: Progressing     Problem: Neuro Status  Goal: Neuro status will remain stable or improve  Outcome: Progressing     Problem: Self Care  Goal: Patient will have the ability to perform ADLs independently or with assistance (bathe, groom, dress, toilet and feed)  Outcome: Progressing     Problem: Mobility  Goal: Patient's capacity to carry out activities will improve  Outcome: Progressing

## 2023-12-20 NOTE — CARE PLAN
The patient is Stable - Low risk of patient condition declining or worsening    Shift Goals  Clinical Goals: pain management  Patient Goals: sleep  Family Goals: leigh ann    Progress made toward(s) clinical / shift goals:    Problem: Knowledge Deficit - Standard  Goal: Patient and family/care givers will demonstrate understanding of plan of care, disease process/condition, diagnostic tests and medications  Outcome: Progressing     Problem: Pain - Standard  Goal: Alleviation of pain or a reduction in pain to the patient’s comfort goal  Outcome: Progressing     Problem: Neuro Status  Goal: Neuro status will remain stable or improve  Outcome: Progressing     Problem: Self Care  Goal: Patient will have the ability to perform ADLs independently or with assistance (bathe, groom, dress, toilet and feed)  Outcome: Progressing     Problem: Mobility  Goal: Patient's capacity to carry out activities will improve  Outcome: Progressing     Problem: Hemodynamics  Goal: Patient's hemodynamics, fluid balance and neurologic status will be stable or improve  Outcome: Progressing       Patient is not progressing towards the following goals:

## 2023-12-22 PROBLEM — Z76.89 ENCOUNTER TO ESTABLISH CARE: Status: ACTIVE | Noted: 2023-12-22

## 2023-12-22 LAB
BACTERIA BLD CULT: NORMAL
BACTERIA BLD CULT: NORMAL
SIGNIFICANT IND 70042: NORMAL
SIGNIFICANT IND 70042: NORMAL
SITE SITE: NORMAL
SITE SITE: NORMAL
SOURCE SOURCE: NORMAL
SOURCE SOURCE: NORMAL

## 2023-12-22 RX ORDER — OXYCODONE HYDROCHLORIDE AND ACETAMINOPHEN 5; 325 MG/1; MG/1
1 TABLET ORAL EVERY 6 HOURS PRN
Qty: 12 TABLET | Refills: 0 | OUTPATIENT
Start: 2023-12-22 | End: 2023-12-25

## 2023-12-22 NOTE — ASSESSMENT & PLAN NOTE
Ongoing-   Referral to Neurology for Stroke bridge clinic for follow up on recent CVA.   Ref to PT to help with continued therapy as outpatient

## 2023-12-22 NOTE — PROGRESS NOTES
No chief complaint on file.      Subjective:     HPI:   Divine Lugo is a 53 y.o. male here to discuss the evaluation and management of:        Problem   Encounter to Establish Care    Patient here to establish care. Has extensive previous medical history including CVA, MI HTN, and chronic pain. Has not followed up with Neurology yet, Has remained on eliquis from recent thrombus causing CVA. Needs referrals as well as further blood pressure control.      Acute Cva (Cerebrovascular Accident) (Hcc)    Patient has recent hospitalization for CVA- thrombus and was discharged on Eliquis. Was supposed to follow up with Stroke bridge clinic but has been unable to do so. Needs referral      Chronic Neck Pain    She has history of surgical intervention to her cervical spine with plate and screw fixation. She continues to have pain secondary to this. Would like to continue on chronic pain management.          ROS  See HPI       Allergies   Allergen Reactions    Gabapentin Unspecified     Causes patient to seizures    Bloodless      Pt states he would accept blood in life or death situation    Depakote [Valproic Acid] Unspecified     Non epileptic seizure ( stated by patient )       Current medicines (including changes today)  Current Outpatient Medications   Medication Sig Dispense Refill    busPIRone (BUSPAR) 7.5 MG tablet Take 1 Tablet by mouth 2 times a day. 90 Tablet 0    acetaminophen (TYLENOL) 500 MG Tab Take 2 Tablets by mouth 3 times a day as needed for Mild Pain. 180 Tablet 0    oxyCODONE-acetaminophen (PERCOCET) 5-325 MG Tab Take 1 Tablet by mouth every 6 hours as needed for Moderate Pain or Severe Pain for up to 3 days. 12 Tablet 0    potassium chloride SA (KDUR) 20 MEQ Tab CR Take 20 mEq by mouth every day.      lisinopril (PRINIVIL) 40 MG tablet Take 40 mg by mouth every day.      carvedilol (COREG) 6.25 MG Tab Take 6.25 mg by mouth 2 times a day.      prazosin (MINIPRESS) 2 MG Cap Take 2 mg by mouth every  evening.      furosemide (LASIX) 40 MG Tab Take 40 mg by mouth every day.      cyclobenzaprine (FLEXERIL) 10 mg Tab Take 1 Tablet by mouth 3 times a day as needed for Muscle Spasms.      amLODIPine (NORVASC) 10 MG Tab Take 10 mg by mouth every day.      omeprazole (PRILOSEC) 40 MG delayed-release capsule Take 40 mg by mouth every day.      trazodone (DESYREL) 300 MG tablet Take 300 mg by mouth every evening. Take with 200 mg (2 tabs of 100 mg) for total dose of 500 mg      atorvastatin (LIPITOR) 80 MG tablet Take 1 Tablet by mouth every day. 30 Tablet 0    ELIQUIS 5 MG Tab Take 1 Tablet by mouth 2 times a day for 30 days. 60 Tablet 0    EPINEPHrine (EPIPEN) 0.3 MG/0.3ML Solution Auto-injector solution for injection INJECT CONTENTS OF 1 PEN SUBCUTANEOUSLY AS NEEDED FOR ALLERGIC REACTION MAY REPEAT DOSE AFTER 5 TO 15 MINTUTES 2 Each 0    aspirin 81 MG EC tablet Take 1 Tablet by mouth every day for 30 days. 30 Tablet 0    isosorbide mononitrate (IMDUR) 120 MG CR tablet Take 1 Tablet by mouth every morning for 30 days. 30 Tablet 0    nitroglycerin (NITROSTAT) 0.4 MG SL Tab Place 1 Tablet under the tongue as needed for Chest Pain for up to 30 days. DISSOLVE ONE TABLET UNDER THE TONGUE EVERY 5 MINUTES AS NEEDED FOR CHEST PAIN DO NOT EXCEED A TOTAL OF 3 DOSES IN 15 MINUTES 25 Tablet 0    ondansetron (ZOFRAN ODT) 4 MG TABLET DISPERSIBLE Take 1 Tablet by mouth every 8 hours as needed for Nausea/Vomiting. 12 Tablet 0     No current facility-administered medications for this visit.       Social History     Tobacco Use    Smoking status: Every Day     Types: Cigarettes     Passive exposure: Current    Smokeless tobacco: Current     Types: Chew    Tobacco comments:      chews/1 can q 2 days, smoke 2  cigs per day   Vaping Use    Vaping Use: Never used   Substance Use Topics    Alcohol use: No     Comment: 23 year ago quit    Drug use: Yes     Types: Marijuana     Comment: at bedtime       Patient Active Problem List    Diagnosis  "Date Noted    Encounter to establish care 12/22/2023    Foraminal stenosis of cervical region 12/18/2023    Stage 2 chronic kidney disease 12/18/2023    Cervical neuralgia 12/18/2023    Gastroesophageal reflux disease with esophagitis without hemorrhage 12/18/2023    Acute intractable tension-type headache 12/17/2023    Elevated troponin 12/17/2023    Pain, dental 12/17/2023    History of posttraumatic stress disorder (PTSD) 11/23/2023    Acute CVA (cerebrovascular accident) ([facility]) 11/23/2023    History of syncope 11/23/2023    Right sided weakness 11/22/2023    Coronary artery disease involving native coronary artery of native heart with refractory angina pectoris ([facility]) 11/22/2023    Chronic neck pain 11/22/2023    Uncontrolled hypertension 06/07/2012    MI (myocardial infarction) ([facility]) 06/07/2012    HEMATURIA 06/07/2012    Nephrolithiasis 06/07/2012    ARF (acute renal failure) ([facility]) 06/07/2012    Obesity 06/07/2012       Family History   Problem Relation Age of Onset    Dementia Mother           Objective:     BP (!) 182/92 (BP Location: Left arm, Patient Position: Sitting, BP Cuff Size: Adult)   Pulse 85   Temp 36.5 °C (97.7 °F) (Temporal)   Resp 17   Ht 1.727 m (5' 8\")   Wt 103 kg (227 lb 4.8 oz)   SpO2 94%  Body mass index is 34.56 kg/m².    Physical Exam:  Physical Exam  Vitals reviewed.   Constitutional:       General: He is awake.      Appearance: He is well-developed.   HENT:      Head: Normocephalic.   Eyes:      Conjunctiva/sclera: Conjunctivae normal.   Cardiovascular:      Rate and Rhythm: Normal rate.   Pulmonary:      Effort: Pulmonary effort is normal. No respiratory distress.   Musculoskeletal:      Cervical back: Neck supple.   Skin:     General: Skin is warm and dry.   Neurological:      Mental Status: He is alert and oriented to person, place, and time.   Psychiatric:         Mood and Affect: Mood normal.         Behavior: Behavior normal. Behavior is cooperative.         Assessment and " Plan:     The following treatment plan was discussed:    Problem List Items Addressed This Visit       Chronic neck pain     Ongoing-   UDS ordered   Controlled substance policy signed   Ref to pain management placed            Relevant Orders    PAIN MANAGEMENT SCRN, W/ RFLX TO QNT (Completed)    Controlled Substance Treatment Agreement    Referral to Pain Management    Acute CVA (cerebrovascular accident) (HCC)     Ongoing-   Referral to Neurology for Stroke bridge clinic for follow up on recent CVA.   Ref to PT to help with continued therapy as outpatient          Relevant Orders    Referral to Physical Therapy    Referral to Neurology    History of syncope    Encounter to establish care     Discussed health history and maintenance   Flu vaccine - Provided  Colon Ca screening - FIT test   Mammogram- Not Applicable will defer to next visit for discussion   Pap smear - Not applicable - will defer to next visit   Preventative screening labs recently completed secondary to hospital admission- will repeat in 3-6 months given past history             Other Visit Diagnoses       Need for vaccination        Relevant Orders    INFLUENZA VACCINE QUAD INJ (PF) (Completed)    Bilateral hearing loss, unspecified hearing loss type        Relevant Orders    Referral to Audiology            Any change or worsening of signs or symptoms, patient encouraged to follow-up or report to emergency room for further evaluation. Patient verbalizes understanding and agrees.    Follow-Up: Follow up in 3 months       PLEASE NOTE: This dictation was created using voice recognition software. I have made every reasonable attempt to correct obvious errors, but I expect that there are errors of grammar and possibly content that I did not discover before finalizing the note.

## 2023-12-22 NOTE — ASSESSMENT & PLAN NOTE
Discussed health history and maintenance   Flu vaccine - Provided  Colon Ca screening - FIT test   Mammogram- Not Applicable will defer to next visit for discussion   Pap smear - Not applicable - will defer to next visit   Preventative screening labs recently completed secondary to hospital admission- will repeat in 3-6 months given past history

## 2023-12-26 ENCOUNTER — TELEPHONE (OUTPATIENT)
Dept: MEDICAL GROUP | Facility: MEDICAL CENTER | Age: 53
End: 2023-12-26

## 2023-12-26 ENCOUNTER — TELEPHONE (OUTPATIENT)
Dept: NEUROLOGY | Facility: MEDICAL CENTER | Age: 53
End: 2023-12-26

## 2023-12-26 ENCOUNTER — HOSPITAL ENCOUNTER (OUTPATIENT)
Dept: LAB | Facility: MEDICAL CENTER | Age: 53
End: 2023-12-26
Attending: NURSE PRACTITIONER
Payer: MEDICAID

## 2023-12-26 ENCOUNTER — OFFICE VISIT (OUTPATIENT)
Dept: MEDICAL GROUP | Facility: MEDICAL CENTER | Age: 53
End: 2023-12-26
Attending: NURSE PRACTITIONER
Payer: MEDICAID

## 2023-12-26 ENCOUNTER — OFFICE VISIT (OUTPATIENT)
Dept: NEUROLOGY | Facility: MEDICAL CENTER | Age: 53
End: 2023-12-26
Attending: PSYCHIATRY & NEUROLOGY
Payer: MEDICAID

## 2023-12-26 VITALS
DIASTOLIC BLOOD PRESSURE: 92 MMHG | BODY MASS INDEX: 32.58 KG/M2 | HEIGHT: 68 IN | SYSTOLIC BLOOD PRESSURE: 196 MMHG | RESPIRATION RATE: 17 BRPM | OXYGEN SATURATION: 95 % | WEIGHT: 215 LBS | TEMPERATURE: 97.4 F | HEART RATE: 94 BPM

## 2023-12-26 VITALS
DIASTOLIC BLOOD PRESSURE: 92 MMHG | TEMPERATURE: 97.6 F | WEIGHT: 225.09 LBS | HEIGHT: 68 IN | SYSTOLIC BLOOD PRESSURE: 174 MMHG | HEART RATE: 83 BPM | BODY MASS INDEX: 34.11 KG/M2 | OXYGEN SATURATION: 98 % | RESPIRATION RATE: 18 BRPM

## 2023-12-26 DIAGNOSIS — G43.719 INTRACTABLE CHRONIC MIGRAINE WITHOUT AURA AND WITHOUT STATUS MIGRAINOSUS: ICD-10-CM

## 2023-12-26 DIAGNOSIS — G08 CEREBRAL VENOUS SINUS THROMBOSIS: ICD-10-CM

## 2023-12-26 DIAGNOSIS — Z13.6 SCREENING FOR CARDIOVASCULAR CONDITION: ICD-10-CM

## 2023-12-26 DIAGNOSIS — G89.29 CHRONIC NECK PAIN: ICD-10-CM

## 2023-12-26 DIAGNOSIS — M54.2 CHRONIC NECK PAIN: ICD-10-CM

## 2023-12-26 DIAGNOSIS — I63.9 ISCHEMIC STROKE (HCC): ICD-10-CM

## 2023-12-26 DIAGNOSIS — I10 UNCONTROLLED HYPERTENSION: ICD-10-CM

## 2023-12-26 LAB
CHOLEST SERPL-MCNC: 213 MG/DL (ref 100–199)
EST. AVERAGE GLUCOSE BLD GHB EST-MCNC: 105 MG/DL
FASTING STATUS PATIENT QL REPORTED: NORMAL
HBA1C MFR BLD: 5.3 % (ref 4–5.6)
HDLC SERPL-MCNC: 52 MG/DL
LDLC SERPL CALC-MCNC: 138 MG/DL
TRIGL SERPL-MCNC: 114 MG/DL (ref 0–149)

## 2023-12-26 PROCEDURE — 99204 OFFICE O/P NEW MOD 45 MIN: CPT | Performed by: PSYCHIATRY & NEUROLOGY

## 2023-12-26 PROCEDURE — 80061 LIPID PANEL: CPT

## 2023-12-26 PROCEDURE — 3077F SYST BP >= 140 MM HG: CPT | Performed by: NURSE PRACTITIONER

## 2023-12-26 PROCEDURE — 3080F DIAST BP >= 90 MM HG: CPT | Performed by: NURSE PRACTITIONER

## 2023-12-26 PROCEDURE — 99212 OFFICE O/P EST SF 10 MIN: CPT | Mod: 27 | Performed by: PSYCHIATRY & NEUROLOGY

## 2023-12-26 PROCEDURE — 36415 COLL VENOUS BLD VENIPUNCTURE: CPT

## 2023-12-26 PROCEDURE — 3080F DIAST BP >= 90 MM HG: CPT | Performed by: PSYCHIATRY & NEUROLOGY

## 2023-12-26 PROCEDURE — 3077F SYST BP >= 140 MM HG: CPT | Performed by: PSYCHIATRY & NEUROLOGY

## 2023-12-26 PROCEDURE — 99214 OFFICE O/P EST MOD 30 MIN: CPT | Performed by: NURSE PRACTITIONER

## 2023-12-26 PROCEDURE — 83036 HEMOGLOBIN GLYCOSYLATED A1C: CPT

## 2023-12-26 RX ORDER — CLOPIDOGREL BISULFATE 75 MG/1
75 TABLET ORAL
COMMUNITY
End: 2023-12-26

## 2023-12-26 RX ORDER — ASPIRIN 81 MG/1
81 TABLET, CHEWABLE ORAL DAILY
Status: ON HOLD | COMMUNITY
End: 2024-02-26

## 2023-12-26 RX ORDER — CYCLOBENZAPRINE HCL 10 MG
10 TABLET ORAL 2 TIMES DAILY PRN
Qty: 60 TABLET | Refills: 0 | Status: SHIPPED
Start: 2023-12-26 | End: 2024-02-29

## 2023-12-26 RX ORDER — TOPIRAMATE 25 MG/1
TABLET ORAL
Qty: 360 TABLET | Refills: 2 | Status: ON HOLD
Start: 2023-12-26 | End: 2024-02-25

## 2023-12-26 ASSESSMENT — FIBROSIS 4 INDEX
FIB4 SCORE: 0.96
FIB4 SCORE: 0.96

## 2023-12-26 NOTE — PROGRESS NOTES
Chief Complaint   Patient presents with    Follow-Up       Subjective:     HPI:   Divine Lugo is a 53 y.o. male here to discuss the evaluation and management of:      Problem   Chronic Neck Pain    Patient in clinic today following hospitalization- States he wants to go back on Narcotic medications to control pain. Discussed with patient at his last visit that [facility] has strict pain management policy and that he would not be able to drink or use marijuana while on medications. He was not provided medications on visit as part of policy to allow for records to be obtained and labs to be completed.      Uncontrolled Hypertension    Patient with noted continued elevated blood pressure in clinic of 196/92. Discussed with patient who stated it is because I am in pain.          ROS  See HPI     Allergies   Allergen Reactions    Gabapentin Unspecified     Causes patient to seizures    Bloodless      Pt states he would accept blood in life or death situation    Depakote [Valproic Acid] Unspecified     Non epileptic seizure ( stated by patient )       Current medicines (including changes today)  Current Outpatient Medications   Medication Sig Dispense Refill    cyclobenzaprine (FLEXERIL) 10 mg Tab Take 1 Tablet by mouth 2 times a day as needed for Muscle Spasms. 60 Tablet 0    aspirin (ASA) 81 MG Chew Tab chewable tablet Chew 81 mg every day.      apixaban (ELIQUIS) 5mg Tab Take 5 mg by mouth 2 times a day.      topiramate (TOPAMAX) 25 MG Tab Week 1: 1 pill daily Week 2: 1 pill twice daily Week 3: 2 pills in the morning and 1 pill in the evening Week 4: 2 pills twice daily 360 Tablet 2    busPIRone (BUSPAR) 7.5 MG tablet Take 1 Tablet by mouth 2 times a day. 90 Tablet 0    acetaminophen (TYLENOL) 500 MG Tab Take 2 Tablets by mouth 3 times a day as needed for Mild Pain. 180 Tablet 0    potassium chloride SA (KDUR) 20 MEQ Tab CR Take 20 mEq by mouth every day.      lisinopril (PRINIVIL) 40 MG tablet Take 40 mg by mouth  every day.      carvedilol (COREG) 6.25 MG Tab Take 6.25 mg by mouth 2 times a day.      prazosin (MINIPRESS) 2 MG Cap Take 2 mg by mouth every evening.      furosemide (LASIX) 40 MG Tab Take 40 mg by mouth every day.      amLODIPine (NORVASC) 10 MG Tab Take 10 mg by mouth every day.      omeprazole (PRILOSEC) 40 MG delayed-release capsule Take 40 mg by mouth every day.      trazodone (DESYREL) 300 MG tablet Take 300 mg by mouth every evening. Take with 200 mg (2 tabs of 100 mg) for total dose of 500 mg      atorvastatin (LIPITOR) 80 MG tablet Take 1 Tablet by mouth every day. 30 Tablet 0    EPINEPHrine (EPIPEN) 0.3 MG/0.3ML Solution Auto-injector solution for injection INJECT CONTENTS OF 1 PEN SUBCUTANEOUSLY AS NEEDED FOR ALLERGIC REACTION MAY REPEAT DOSE AFTER 5 TO 15 MINTUTES 2 Each 0    nitroglycerin (NITROSTAT) 0.4 MG SL Tab Place 1 Tablet under the tongue as needed for Chest Pain for up to 30 days. DISSOLVE ONE TABLET UNDER THE TONGUE EVERY 5 MINUTES AS NEEDED FOR CHEST PAIN DO NOT EXCEED A TOTAL OF 3 DOSES IN 15 MINUTES 25 Tablet 0    ondansetron (ZOFRAN ODT) 4 MG TABLET DISPERSIBLE Take 1 Tablet by mouth every 8 hours as needed for Nausea/Vomiting. 12 Tablet 0     No current facility-administered medications for this visit.       Social History     Tobacco Use    Smoking status: Every Day     Types: Cigarettes     Passive exposure: Current    Smokeless tobacco: Current     Types: Chew    Tobacco comments:      chews/1 can q 2 days, smoke 2  cigs per day   Vaping Use    Vaping Use: Never used   Substance Use Topics    Alcohol use: No     Comment: 23 year ago quit    Drug use: Yes     Types: Marijuana     Comment: at bedtime       Patient Active Problem List    Diagnosis Date Noted    Encounter to establish care 12/22/2023    Foraminal stenosis of cervical region 12/18/2023    Stage 2 chronic kidney disease 12/18/2023    Cervical neuralgia 12/18/2023    Gastroesophageal reflux disease with esophagitis without  "hemorrhage 12/18/2023    Acute intractable tension-type headache 12/17/2023    Elevated troponin 12/17/2023    Pain, dental 12/17/2023    History of posttraumatic stress disorder (PTSD) 11/23/2023    History of syncope 11/23/2023    Right sided weakness 11/22/2023    Coronary artery disease involving native coronary artery of native heart with refractory angina pectoris ([facility]) 11/22/2023    Chronic neck pain 11/22/2023    Uncontrolled hypertension 06/07/2012    MI (myocardial infarction) (HCC) 06/07/2012    HEMATURIA 06/07/2012    Nephrolithiasis 06/07/2012    ARF (acute renal failure) ([facility]) 06/07/2012    Obesity 06/07/2012       Family History   Problem Relation Age of Onset    Dementia Mother           Objective:     BP (!) 196/92 (BP Location: Left arm, Patient Position: Sitting, BP Cuff Size: Adult long)   Pulse 94   Temp 36.3 °C (97.4 °F) (Temporal)   Resp 17   Ht 1.727 m (5' 8\")   Wt 97.5 kg (215 lb)   SpO2 95%  Body mass index is 32.69 kg/m².    Physical Exam:  Physical Exam  Vitals reviewed.   Constitutional:       General: He is awake.      Appearance: Normal appearance. He is well-developed and overweight.   HENT:      Head: Normocephalic.   Cardiovascular:      Rate and Rhythm: Normal rate.   Pulmonary:      Effort: Pulmonary effort is normal. No respiratory distress.   Musculoskeletal:      Cervical back: Neck supple.   Skin:     General: Skin is warm and dry.   Neurological:      Mental Status: He is alert and oriented to person, place, and time.   Psychiatric:         Mood and Affect: Affect is angry.         Behavior: Behavior is agitated and aggressive. Behavior is cooperative.              Assessment and Plan:     The following treatment plan was discussed:    Problem List Items Addressed This Visit       Uncontrolled hypertension     Ongoing- Uncontrolled   Patient review of records shows that he is not compliant with medications other than lisinopril.  I did discuss adjusting medications, " "however patient refused stating unless I was giving him pain medications he does not want any other changes. He states his blood pressure is only elevated due to pain and that is the only way to fix it.   Attempted to discuss risks associated with that high of blood pressure but patient not receptive to discussion, started asking where he could go to find another primary provider.   Advised patient to please present to the er if he started to develop any chest pressure or stroke like symptoms given his blood pressure and refusal of changes in management.          Chronic neck pain     Patient became very upset with provider when I stated I would not be able to prescribe him narcotics with his urine showing positive for marijuana. Stated \"[facility] would give someone pain medication if they need it- this is bullshit I need to leave this country- Is there any other medical doctors I can see\". Explained to patient that it was included in policy he signed at previous appointment- patient then stated - \"I did not read that, I can't see without my glasses\". Discussed with patient that we would have been willing to read policy to him if he had stated that.   Patient has been authorized to see [facility] for pain management  Provided patient their number to follow up  Did provide one month supply of flexeril to help with muscle spasms          Relevant Medications    cyclobenzaprine (FLEXERIL) 10 mg Tab     Other Visit Diagnoses       Screening for cardiovascular condition        Relevant Orders    Lipid Profile    HEMOGLOBIN A1C            Any change or worsening of signs or symptoms, patient encouraged to follow-up or report to emergency room for further evaluation. Patient verbalizes understanding and agrees.    Follow-Up: Follow up with cardiology, Pain management, and a primary provider as patient does not want to return to our clinic. Discussed importance of continued medical care.       PLEASE NOTE: This dictation " was created using voice recognition software. I have made every reasonable attempt to correct obvious errors, but I expect that there are errors of grammar and possibly content that I did not discover before finalizing the note.

## 2023-12-26 NOTE — TELEPHONE ENCOUNTER
Received Refill PA request via [employer]  for Topiramate 25mg tab. (Quantity:360 tab, Day Supply:100)     Insurance: [employer]  Member ID:  [id number]  BIN: [id number]  PCN: [id number]  [id number]     Ran Test claim via [place] & medication Pays for a $0.00 copay. Will outreach to patient to offer specialty pharmacy services and or release to preferred pharmacy

## 2023-12-26 NOTE — ASSESSMENT & PLAN NOTE
"Patient became very upset with provider when I stated I would not be able to prescribe him narcotics with his urine showing positive for marijuana. Stated \"Any sane doctor would give someone pain medication if they need it- this is bullshit I need to leave this country- Is there any other medical doctors I can see\". Explained to patient that it was included in policy he signed at previous appointment- patient then stated - \"I did not read that, I can't see without my glasses\". Discussed with patient that we would have been willing to read policy to him if he had stated that.   Patient has been authorized to see José Miguel for pain management  Provided patient their number to follow up  Did provide one month supply of flexeril to help with muscle spasms   "

## 2023-12-26 NOTE — PROGRESS NOTES
"    Chief Complaint   Patient presents with    New Patient     Stroke bridge       History of present illness:  Divine Lugo 53 y.o. male on Eliquis and aspirin with history of anoxic brain injury from cardiac arrest  with history of venous sinus thrombosis on anticoagulation presents to stroke bridge clinic. This patient presented with R sided weakness on 11/21/23, found to have L precentral gyrus ischemic stroke.     He has been having headaches and \"noise in the head\". He has history of migraines but lately they are more severe.     He claims that he was exposed to high levels of radiation in [place] verified with blood samples. He also endorses history of DVT and PE.     Past medical history:   Past Medical History:   Diagnosis Date    Angina     Arthritis     Back fracture     T12 THRU LUMBER FRACTURED         Backpain     CAD (coronary artery disease)     mi    Cancer (HCC)     DVT     Fracture closed, pelvis     RIGHT    HTN (hypertension) 06/07/2012    Hypertension     Lipoma NOS     Myocardial infarct (HCC)     Nephrolithiasis 06/07/2012    Nonmalignant tumors     LUNG NECK SHOULDER    Personal history of venous thrombosis and embolism     PE, RLE DVT    Pneumonia     Pulmonary embolism ([facility])     Renal disorder     KIDNEY STONES    Stroke ([facility])     Stroke determined by clinical assessment ([facility]) 11/21/2023    Unspecified urinary incontinence PE       Past surgical history:   Past Surgical History:   Procedure Laterality Date    CYSTOSCOPY STENT PLACEMENT  6/3/2012    Performed by JAVIER MEDEIROS at SURGERY [facility]    URETEROSCOPY  6/3/2012    Performed by JAVIER MEDEIROS at SURGERY [facility]    LASERTRIPSY  6/3/2012    Performed by JAVIER MEDEIROS at SURGERY [facility]    OTHER      TUMOR REMOVAL X5 ON BACK    OTHER CARDIAC SURGERY      cath here on 6/29    OTHER ORTHOPEDIC SURGERY      wrist, knee, back       Family history:   Family History   Problem Relation " Age of Onset    Dementia Mother        Social history:   Social History     Socioeconomic History    Marital status: Single     Spouse name: Not on file    Number of children: Not on file    Years of education: Not on file    Highest education level: Not on file   Occupational History    Not on file   Tobacco Use    Smoking status: Every Day     Types: Cigarettes     Passive exposure: Current    Smokeless tobacco: Current     Types: Chew    Tobacco comments:      chews/1 can q 2 days, smoke 2  cigs per day   Vaping Use    Vaping Use: Never used   Substance and Sexual Activity    Alcohol use: No     Comment: 23 year ago quit    Drug use: Yes     Types: Marijuana     Comment: at bedtime    Sexual activity: Not on file   Other Topics Concern    Not on file   Social History Narrative    ** Merged History Encounter **          Social Determinants of Health     Financial Resource Strain: Not on file   Food Insecurity: Not on file   Transportation Needs: Not on file   Physical Activity: Not on file   Stress: Not on file   Social Connections: Not on file   Intimate Partner Violence: Not on file   Housing Stability: Not on file       Current medications:   Current Outpatient Medications   Medication    aspirin (ASA) 81 MG Chew Tab chewable tablet    apixaban (ELIQUIS) 5mg Tab    topiramate (TOPAMAX) 25 MG Tab    busPIRone (BUSPAR) 7.5 MG tablet    acetaminophen (TYLENOL) 500 MG Tab    potassium chloride SA (KDUR) 20 MEQ Tab CR    lisinopril (PRINIVIL) 40 MG tablet    carvedilol (COREG) 6.25 MG Tab    prazosin (MINIPRESS) 2 MG Cap    furosemide (LASIX) 40 MG Tab    cyclobenzaprine (FLEXERIL) 10 mg Tab    amLODIPine (NORVASC) 10 MG Tab    omeprazole (PRILOSEC) 40 MG delayed-release capsule    trazodone (DESYREL) 300 MG tablet    atorvastatin (LIPITOR) 80 MG tablet    EPINEPHrine (EPIPEN) 0.3 MG/0.3ML Solution Auto-injector solution for injection    nitroglycerin (NITROSTAT) 0.4 MG SL Tab    ondansetron (ZOFRAN ODT) 4 MG TABLET  DISPERSIBLE     No current facility-administered medications for this visit.       Medication Allergy:  Allergies   Allergen Reactions    Gabapentin Unspecified     Causes patient to seizures    Bloodless      Pt states he would accept blood in life or death situation    Depakote [Valproic Acid] Unspecified     Non epileptic seizure ( stated by patient )       Physical examination:     Neurological Exam  Mental Status  Awake and alert. Speech is normal. Language is fluent with no aphasia.    Cranial Nerves  CN III, IV, VI: Extraocular movements intact bilaterally. No nystagmus. Normal smooth pursuit.  CN VII:  Right: There is no facial weakness.  Left: There is no facial weakness.    Motor   Strength is 5/5 throughout all four extremities.    Coordination  Right: Finger-to-nose normal.Left: Finger-to-nose normal.    Gait  Casual gait is normal including stance, stride, and arm swing.      Labs:  I reviewed the following labs personally:  Lab Results   Component Value Date/Time    HBA1C 5.0 2023 09:23 PM      Lab Results   Component Value Date/Time    CHOLSTRLTOT 157 2023    TRIGLYCERIDE 190 (H) 2023    HDL 35 (A) 2023    LDL 84 2023       Imagin/19 MR VENOGRAM  IMPRESSION:        No evidence of acute intracranial dural venous sinus thrombosis.     Left sigmoid sinus is occluded, likely chronic.    23 MRI BRAIN W/O   IMPRESSION:        Small foci of acute infarction in the left frontoparietal region.     A few scattered nonspecific T2 hyperintensities are present in the deep white matter, within expected limits for the age of the patient, most likely related to leukoariosis.     Transthoracic  Echo Report        Echocardiography Laboratory     CONCLUSIONS  Normal left ventricular systolic function.  Mild aortic insufficiency.     3/2023 CTA HEAD w/o   CTA NECK:   Stenosis of the left brachiocephalic vein results in extensive reflux of intravenous contrast  into the paravertebral venous plexus, partly obscuring some portions of the arteries of the neck and decreasing the accuracy of the exam.   LEFT CAROTID:  No aneurysm, dissection, traumatic vascular injury, or hemodynamically significant stenoses.   RIGHT CAROTID:  No aneurysm, dissection, traumatic vascular injury, or hemodynamically significant stenoses.   VERTEBROBASILAR:  No aneurysm, dissection, traumatic vascular injury, or hemodynamically significant stenoses.     NON-VASCULAR STRUCTURES: C6-7 ACDF.     IMPRESSION:     No acute intracranial hemorrhage, hydrocephalus or herniation.     Patent arteries in the head and neck. See comment.     Stenosis of the left brachiocephalic vein.     ASSESSMENT AND PLAN:  Problem List Items Addressed This Visit    None  Visit Diagnoses       Ischemic stroke (HCC)        Relevant Medications    apixaban (ELIQUIS) 5mg Tab    Cerebral venous sinus thrombosis        Relevant Medications    apixaban (ELIQUIS) 5mg Tab    Other Relevant Orders    BETA-2 GLYCOPROTEIN I AB,G,A,M    ANTICARDIOLIPIN AB IGG,IGM,IGA    FACTOR II DNA ANALYSIS    FACTOR V LEIDEN MUTATION    Intractable chronic migraine without aura and without status migrainosus        Relevant Medications    aspirin (ASA) 81 MG Chew Tab chewable tablet    apixaban (ELIQUIS) 5mg Tab    topiramate (TOPAMAX) 25 MG Tab            1. Ischemic stroke (HCC)    2. Cerebral venous sinus thrombosis  - BETA-2 GLYCOPROTEIN I AB,G,A,M  - ANTICARDIOLIPIN AB IGG,IGM,IGA; Future  - FACTOR II DNA ANALYSIS; Future  - FACTOR V LEIDEN MUTATION; Future    3. Intractable chronic migraine without aura and without status migrainosus  - topiramate (TOPAMAX) 25 MG Tab; Week 1: 1 pill daily Week 2: 1 pill twice daily Week 3: 2 pills in the morning and 1 pill in the evening Week 4: 2 pills twice daily  Dispense: 360 Tablet; Refill: 2    Other orders  - aspirin (ASA) 81 MG Chew Tab chewable tablet; Chew 81 mg every day.  - apixaban (ELIQUIS) 5mg Tab;  Take 5 mg by mouth 2 times a day.      Presumed Mechanism by TOAST  __  Large-artery atherosclerosis  __  Cardioembolism  __  Small-vessel occlusion  __  Stroke of other determined etiology   _x_  Stroke of undetermined etiology     Antithrombotic: Eliquis 5mg twice daily (prescribed due to history of venous sinus thrombosis, continued per neurohospitalist after recent ischemic stroke)   Blood pressure goal: < 140/90  LDL goal: < 100    53-year-old male with history of venous sinus thrombosis, as well as self endorsed history of DVT/PE, as well as a recent ischemic stroke.  He has history raises concern for hypercoagulable state.  I have ordered labs for antiphospholipid syndrome as well as venous hypercoagulable states.  Patient is on apixaban and had a stroke on apixaban.    Transesophageal echo could be considered if there is evidence for venous hypercoagulable state, to rule out PFO.  There is no evidence of patent foramen ovale on transthoracic echo.    Today, patient's main issue is a severe chronic migraine.  I prescribed topiramate for treatment of chronic migraine.  He will continue Eliquis as well as his baby aspirin which is not necessary from a neurologic standpoint but is being used for his coronary stents.    Return in about 3 months (around 3/26/2024).    Total time spent for the day for this patient unrelated to procedure time is: 49 minutes. I spent 24 minutes in face to face time and I spent 20 minutes pre-charting and 5 minutes in post-visit documentation.      MELVA SalazarO.  [facility]

## 2023-12-26 NOTE — PATIENT INSTRUCTIONS
Pain managment  [facility]  [id number]  [place] [address]  Phone: [phone]    Neurosurgery   [facility]  [id number]  [facility] [address] [phone]

## 2023-12-26 NOTE — ASSESSMENT & PLAN NOTE
Ongoing- Uncontrolled   Patient review of records shows that he is not compliant with medications other than lisinopril.  I did discuss adjusting medications, however patient refused stating unless I was giving him pain medications he does not want any other changes. He states his blood pressure is only elevated due to pain and that is the only way to fix it.   Attempted to discuss risks associated with that high of blood pressure but patient not receptive to discussion, started asking where he could go to find another primary provider.   Advised patient to please present to the er if he started to develop any chest pressure or stroke like symptoms given his blood pressure and refusal of changes in management.

## 2023-12-26 NOTE — PATIENT INSTRUCTIONS
Go to the lab to have your blood drawn for clotting disorders    I have started you on TOPIRAMATE for prevention of migraine headache     topiramate (TOPAMAX) 25 MG Tab [[id number]]    Order Details  Dose, Route, Frequency: As Directed   Dispense Quantity: 360 Tablet Refills: 2          Sig: Week 1: 1 pill daily Week 2: 1 pill twice daily Week 3: 2 pills in the morning and 1 pill in the evening Week 4: 2 pills twice daily

## 2023-12-27 ENCOUNTER — TELEPHONE (OUTPATIENT)
Dept: NEUROLOGY | Facility: MEDICAL CENTER | Age: 53
End: 2023-12-27
Payer: MEDICAID

## 2023-12-27 NOTE — TELEPHONE ENCOUNTER
Attempted to contact patient at [phone] to discuss [facility] and services/benefits offered. No answer, was not able to leave a voicemail.    Yarely Wells

## 2023-12-27 NOTE — TELEPHONE ENCOUNTER
VOICEMAIL  1. Caller Name: Divine Lugo                        Call Back Number: [phone] (home)       2. Message: patient left a 2nd voicemail he mention what the reason he only taking 2 times a day instead of 3 for the  flexeril.    3. Patient approves office to leave a detailed voicemail/MyChart message: yes

## 2023-12-28 ENCOUNTER — APPOINTMENT (OUTPATIENT)
Dept: UROLOGY | Facility: MEDICAL CENTER | Age: 53
End: 2023-12-28
Payer: MEDICAID

## 2024-01-10 ENCOUNTER — TELEPHONE (OUTPATIENT)
Dept: CARDIOLOGY | Facility: PHYSICIAN GROUP | Age: 54
End: 2024-01-10
Payer: MEDICAID

## 2024-01-10 NOTE — TELEPHONE ENCOUNTER
Spoke to patient in regards to obtaining records for NP appointment with BELK[facility]. Per patient has seen a cardiologist briefly in 2021, records in [facility].

## 2024-02-24 ENCOUNTER — APPOINTMENT (OUTPATIENT)
Dept: RADIOLOGY | Facility: MEDICAL CENTER | Age: 54
DRG: 321 | End: 2024-02-24
Attending: STUDENT IN AN ORGANIZED HEALTH CARE EDUCATION/TRAINING PROGRAM
Payer: MEDICAID

## 2024-02-24 ENCOUNTER — HOSPITAL ENCOUNTER (INPATIENT)
Facility: MEDICAL CENTER | Age: 54
LOS: 2 days | DRG: 321 | End: 2024-02-26
Attending: HOSPITALIST | Admitting: STUDENT IN AN ORGANIZED HEALTH CARE EDUCATION/TRAINING PROGRAM
Payer: MEDICAID

## 2024-02-24 DIAGNOSIS — I10 PRIMARY HYPERTENSION: ICD-10-CM

## 2024-02-24 DIAGNOSIS — R07.9 CHEST PAIN, UNSPECIFIED TYPE: ICD-10-CM

## 2024-02-24 DIAGNOSIS — I21.4 NSTEMI (NON-ST ELEVATED MYOCARDIAL INFARCTION) (HCC): ICD-10-CM

## 2024-02-24 DIAGNOSIS — I25.112 CORONARY ARTERY DISEASE INVOLVING NATIVE CORONARY ARTERY OF NATIVE HEART WITH REFRACTORY ANGINA PECTORIS (HCC): ICD-10-CM

## 2024-02-24 DIAGNOSIS — I63.9 ACUTE CVA (CEREBROVASCULAR ACCIDENT) (HCC): ICD-10-CM

## 2024-02-24 DIAGNOSIS — I21.4 NON-ST ELEVATION MYOCARDIAL INFARCTION (NSTEMI) (HCC): Chronic | ICD-10-CM

## 2024-02-24 PROBLEM — Z72.0 TOBACCO ABUSE: Status: ACTIVE | Noted: 2024-02-24

## 2024-02-24 LAB
ALBUMIN SERPL BCP-MCNC: 3.9 G/DL (ref 3.2–4.9)
ALBUMIN/GLOB SERPL: 1.3 G/DL
ALP SERPL-CCNC: 108 U/L (ref 30–99)
ALT SERPL-CCNC: 28 U/L (ref 2–50)
ANION GAP SERPL CALC-SCNC: 13 MMOL/L (ref 7–16)
APTT PPP: 35.9 SEC (ref 24.7–36)
AST SERPL-CCNC: 32 U/L (ref 12–45)
BASOPHILS # BLD AUTO: 0.4 % (ref 0–1.8)
BASOPHILS # BLD: 0.04 K/UL (ref 0–0.12)
BILIRUB SERPL-MCNC: 0.5 MG/DL (ref 0.1–1.5)
BUN SERPL-MCNC: 14 MG/DL (ref 8–22)
CALCIUM ALBUM COR SERPL-MCNC: 8.6 MG/DL (ref 8.5–10.5)
CALCIUM SERPL-MCNC: 8.5 MG/DL (ref 8.5–10.5)
CHLORIDE SERPL-SCNC: 105 MMOL/L (ref 96–112)
CO2 SERPL-SCNC: 22 MMOL/L (ref 20–33)
CREAT SERPL-MCNC: 1.17 MG/DL (ref 0.5–1.4)
EOSINOPHIL # BLD AUTO: 0.09 K/UL (ref 0–0.51)
EOSINOPHIL NFR BLD: 1 % (ref 0–6.9)
ERYTHROCYTE [DISTWIDTH] IN BLOOD BY AUTOMATED COUNT: 51.5 FL (ref 35.9–50)
GFR SERPLBLD CREATININE-BSD FMLA CKD-EPI: 74 ML/MIN/1.73 M 2
GLOBULIN SER CALC-MCNC: 3.1 G/DL (ref 1.9–3.5)
GLUCOSE SERPL-MCNC: 122 MG/DL (ref 65–99)
HCT VFR BLD AUTO: 50.4 % (ref 42–52)
HGB BLD-MCNC: 17.6 G/DL (ref 14–18)
IMM GRANULOCYTES # BLD AUTO: 0.08 K/UL (ref 0–0.11)
IMM GRANULOCYTES NFR BLD AUTO: 0.9 % (ref 0–0.9)
INR PPP: 0.89 (ref 0.87–1.13)
LYMPHOCYTES # BLD AUTO: 2.04 K/UL (ref 1–4.8)
LYMPHOCYTES NFR BLD: 22.3 % (ref 22–41)
MCH RBC QN AUTO: 30.3 PG (ref 27–33)
MCHC RBC AUTO-ENTMCNC: 34.9 G/DL (ref 32.3–36.5)
MCV RBC AUTO: 86.7 FL (ref 81.4–97.8)
MONOCYTES # BLD AUTO: 0.8 K/UL (ref 0–0.85)
MONOCYTES NFR BLD AUTO: 8.7 % (ref 0–13.4)
NEUTROPHILS # BLD AUTO: 6.1 K/UL (ref 1.82–7.42)
NEUTROPHILS NFR BLD: 66.7 % (ref 44–72)
NRBC # BLD AUTO: 0 K/UL
NRBC BLD-RTO: 0 /100 WBC (ref 0–0.2)
PLATELET # BLD AUTO: 217 K/UL (ref 164–446)
PMV BLD AUTO: 10 FL (ref 9–12.9)
POTASSIUM SERPL-SCNC: 4 MMOL/L (ref 3.6–5.5)
PROT SERPL-MCNC: 7 G/DL (ref 6–8.2)
PROTHROMBIN TIME: 12.1 SEC (ref 12–14.6)
RBC # BLD AUTO: 5.81 M/UL (ref 4.7–6.1)
SODIUM SERPL-SCNC: 140 MMOL/L (ref 135–145)
TROPONIN T SERPL-MCNC: 157 NG/L (ref 6–19)
TROPONIN T SERPL-MCNC: 212 NG/L (ref 6–19)
UFH PPP CHRO-ACNC: 0.14 IU/ML
WBC # BLD AUTO: 9.2 K/UL (ref 4.8–10.8)

## 2024-02-24 PROCEDURE — 85025 COMPLETE CBC W/AUTO DIFF WBC: CPT

## 2024-02-24 PROCEDURE — 85730 THROMBOPLASTIN TIME PARTIAL: CPT

## 2024-02-24 PROCEDURE — 85610 PROTHROMBIN TIME: CPT

## 2024-02-24 PROCEDURE — 770020 HCHG ROOM/CARE - TELE (206)

## 2024-02-24 PROCEDURE — 700102 HCHG RX REV CODE 250 W/ 637 OVERRIDE(OP): Performed by: STUDENT IN AN ORGANIZED HEALTH CARE EDUCATION/TRAINING PROGRAM

## 2024-02-24 PROCEDURE — A9270 NON-COVERED ITEM OR SERVICE: HCPCS | Performed by: STUDENT IN AN ORGANIZED HEALTH CARE EDUCATION/TRAINING PROGRAM

## 2024-02-24 PROCEDURE — 99255 IP/OBS CONSLTJ NEW/EST HI 80: CPT | Performed by: INTERNAL MEDICINE

## 2024-02-24 PROCEDURE — 84484 ASSAY OF TROPONIN QUANT: CPT | Mod: 91

## 2024-02-24 PROCEDURE — 700111 HCHG RX REV CODE 636 W/ 250 OVERRIDE (IP): Performed by: STUDENT IN AN ORGANIZED HEALTH CARE EDUCATION/TRAINING PROGRAM

## 2024-02-24 PROCEDURE — 36415 COLL VENOUS BLD VENIPUNCTURE: CPT

## 2024-02-24 PROCEDURE — 85520 HEPARIN ASSAY: CPT

## 2024-02-24 PROCEDURE — 70355 PANORAMIC X-RAY OF JAWS: CPT

## 2024-02-24 PROCEDURE — 80053 COMPREHEN METABOLIC PANEL: CPT

## 2024-02-24 PROCEDURE — 99223 1ST HOSP IP/OBS HIGH 75: CPT | Mod: 25 | Performed by: STUDENT IN AN ORGANIZED HEALTH CARE EDUCATION/TRAINING PROGRAM

## 2024-02-24 PROCEDURE — 99406 BEHAV CHNG SMOKING 3-10 MIN: CPT | Performed by: STUDENT IN AN ORGANIZED HEALTH CARE EDUCATION/TRAINING PROGRAM

## 2024-02-24 PROCEDURE — 93005 ELECTROCARDIOGRAM TRACING: CPT | Performed by: STUDENT IN AN ORGANIZED HEALTH CARE EDUCATION/TRAINING PROGRAM

## 2024-02-24 RX ORDER — ONDANSETRON 2 MG/ML
4 INJECTION INTRAMUSCULAR; INTRAVENOUS EVERY 4 HOURS PRN
Status: DISCONTINUED | OUTPATIENT
Start: 2024-02-24 | End: 2024-02-26 | Stop reason: HOSPADM

## 2024-02-24 RX ORDER — FUROSEMIDE 40 MG/1
40 TABLET ORAL DAILY
Status: DISCONTINUED | OUTPATIENT
Start: 2024-02-25 | End: 2024-02-26 | Stop reason: HOSPADM

## 2024-02-24 RX ORDER — TRAZODONE HYDROCHLORIDE 300 MG/1
300 TABLET ORAL NIGHTLY
Status: DISCONTINUED | OUTPATIENT
Start: 2024-02-24 | End: 2024-02-24

## 2024-02-24 RX ORDER — HEPARIN SODIUM 1000 [USP'U]/ML
30 INJECTION, SOLUTION INTRAVENOUS; SUBCUTANEOUS PRN
Status: DISCONTINUED | OUTPATIENT
Start: 2024-02-24 | End: 2024-02-25

## 2024-02-24 RX ORDER — AMLODIPINE BESYLATE 10 MG/1
10 TABLET ORAL DAILY
Status: DISCONTINUED | OUTPATIENT
Start: 2024-02-25 | End: 2024-02-26 | Stop reason: HOSPADM

## 2024-02-24 RX ORDER — PRAZOSIN HYDROCHLORIDE 2 MG/1
2 CAPSULE ORAL NIGHTLY
Status: DISCONTINUED | OUTPATIENT
Start: 2024-02-24 | End: 2024-02-24

## 2024-02-24 RX ORDER — ASPIRIN 81 MG/1
81 TABLET, CHEWABLE ORAL DAILY
Status: DISCONTINUED | OUTPATIENT
Start: 2024-02-25 | End: 2024-02-25

## 2024-02-24 RX ORDER — CARVEDILOL 6.25 MG/1
6.25 TABLET ORAL 2 TIMES DAILY WITH MEALS
Status: DISCONTINUED | OUTPATIENT
Start: 2024-02-24 | End: 2024-02-25

## 2024-02-24 RX ORDER — ACETAMINOPHEN 500 MG
1000 TABLET ORAL 3 TIMES DAILY PRN
Status: DISCONTINUED | OUTPATIENT
Start: 2024-02-24 | End: 2024-02-24

## 2024-02-24 RX ORDER — ATORVASTATIN CALCIUM 80 MG/1
80 TABLET, FILM COATED ORAL DAILY
Status: DISCONTINUED | OUTPATIENT
Start: 2024-02-25 | End: 2024-02-26 | Stop reason: HOSPADM

## 2024-02-24 RX ORDER — OMEPRAZOLE 20 MG/1
40 CAPSULE, DELAYED RELEASE ORAL DAILY
Status: DISCONTINUED | OUTPATIENT
Start: 2024-02-25 | End: 2024-02-26 | Stop reason: HOSPADM

## 2024-02-24 RX ORDER — HYDRALAZINE HYDROCHLORIDE 20 MG/ML
10 INJECTION INTRAMUSCULAR; INTRAVENOUS EVERY 6 HOURS PRN
Status: DISCONTINUED | OUTPATIENT
Start: 2024-02-24 | End: 2024-02-26 | Stop reason: HOSPADM

## 2024-02-24 RX ORDER — LISINOPRIL 40 MG/1
40 TABLET ORAL DAILY
Status: DISCONTINUED | OUTPATIENT
Start: 2024-02-24 | End: 2024-02-24

## 2024-02-24 RX ORDER — ACETAMINOPHEN 325 MG/1
650 TABLET ORAL EVERY 6 HOURS PRN
Status: DISCONTINUED | OUTPATIENT
Start: 2024-02-24 | End: 2024-02-25

## 2024-02-24 RX ORDER — CYCLOBENZAPRINE HCL 10 MG
10 TABLET ORAL 2 TIMES DAILY PRN
Status: DISCONTINUED | OUTPATIENT
Start: 2024-02-24 | End: 2024-02-26 | Stop reason: HOSPADM

## 2024-02-24 RX ORDER — NITROGLYCERIN 0.4 MG/1
0.4 TABLET SUBLINGUAL PRN
Status: DISCONTINUED | OUTPATIENT
Start: 2024-02-24 | End: 2024-02-26 | Stop reason: HOSPADM

## 2024-02-24 RX ORDER — HEPARIN SODIUM 5000 [USP'U]/100ML
0-30 INJECTION, SOLUTION INTRAVENOUS CONTINUOUS
Status: DISCONTINUED | OUTPATIENT
Start: 2024-02-24 | End: 2024-02-25

## 2024-02-24 RX ORDER — TOPIRAMATE 25 MG/1
25 TABLET ORAL DAILY
Status: DISCONTINUED | OUTPATIENT
Start: 2024-02-24 | End: 2024-02-24

## 2024-02-24 RX ORDER — OXYCODONE HYDROCHLORIDE 5 MG/1
5 TABLET ORAL ONCE
Status: COMPLETED | OUTPATIENT
Start: 2024-02-24 | End: 2024-02-24

## 2024-02-24 RX ORDER — BUSPIRONE HYDROCHLORIDE 7.5 MG/1
7.5 TABLET ORAL 2 TIMES DAILY
Status: DISCONTINUED | OUTPATIENT
Start: 2024-02-24 | End: 2024-02-24

## 2024-02-24 RX ADMIN — HEPARIN SODIUM 2500 UNITS: 1000 INJECTION, SOLUTION INTRAVENOUS; SUBCUTANEOUS at 19:02

## 2024-02-24 RX ADMIN — HEPARIN SODIUM 16 UNITS/KG/HR: 5000 INJECTION, SOLUTION INTRAVENOUS at 19:07

## 2024-02-24 RX ADMIN — CARVEDILOL 6.25 MG: 6.25 TABLET, FILM COATED ORAL at 20:45

## 2024-02-24 RX ADMIN — OXYCODONE 5 MG: 5 TABLET ORAL at 19:51

## 2024-02-24 RX ADMIN — HEPARIN SODIUM 12 UNITS/KG/HR: 5000 INJECTION, SOLUTION INTRAVENOUS at 17:30

## 2024-02-24 RX ADMIN — ACETAMINOPHEN 650 MG: 325 TABLET, FILM COATED ORAL at 19:02

## 2024-02-24 RX ADMIN — TRAZODONE HYDROCHLORIDE 500 MG: 150 TABLET ORAL at 21:43

## 2024-02-24 ASSESSMENT — ENCOUNTER SYMPTOMS
CLAUDICATION: 0
SENSORY CHANGE: 0
INSOMNIA: 0
SHORTNESS OF BREATH: 0
HEMOPTYSIS: 0
SPUTUM PRODUCTION: 0
CHILLS: 0
FEVER: 0
HEADACHES: 0
BACK PAIN: 0
PALPITATIONS: 0
NECK PAIN: 0
ABDOMINAL PAIN: 0
EYE DISCHARGE: 0
NAUSEA: 0
WEAKNESS: 1
TINGLING: 0
NERVOUS/ANXIOUS: 0
TREMORS: 0
HALLUCINATIONS: 0
DOUBLE VISION: 0
EYE PAIN: 0
DIARRHEA: 0
ORTHOPNEA: 0
VOMITING: 0
FOCAL WEAKNESS: 0
PHOTOPHOBIA: 0
SINUS PAIN: 0
DIZZINESS: 0

## 2024-02-24 ASSESSMENT — FIBROSIS 4 INDEX
FIB4 SCORE: 0.96
FIB4 SCORE: 0.96

## 2024-02-24 ASSESSMENT — COGNITIVE AND FUNCTIONAL STATUS - GENERAL
SUGGESTED CMS G CODE MODIFIER DAILY ACTIVITY: CH
DAILY ACTIVITIY SCORE: 24
MOBILITY SCORE: 24
SUGGESTED CMS G CODE MODIFIER MOBILITY: CH

## 2024-02-24 ASSESSMENT — PATIENT HEALTH QUESTIONNAIRE - PHQ9
3. TROUBLE FALLING OR STAYING ASLEEP OR SLEEPING TOO MUCH: NOT AT ALL
4. FEELING TIRED OR HAVING LITTLE ENERGY: NOT AT ALL
9. THOUGHTS THAT YOU WOULD BE BETTER OFF DEAD, OR OF HURTING YOURSELF: NOT AT ALL
7. TROUBLE CONCENTRATING ON THINGS, SUCH AS READING THE NEWSPAPER OR WATCHING TELEVISION: NOT AT ALL
6. FEELING BAD ABOUT YOURSELF - OR THAT YOU ARE A FAILURE OR HAVE LET YOURSELF OR YOUR FAMILY DOWN: NOT AL ALL
SUM OF ALL RESPONSES TO PHQ9 QUESTIONS 1 AND 2: 2
1. LITTLE INTEREST OR PLEASURE IN DOING THINGS: SEVERAL DAYS
2. FEELING DOWN, DEPRESSED, IRRITABLE, OR HOPELESS: SEVERAL DAYS
8. MOVING OR SPEAKING SO SLOWLY THAT OTHER PEOPLE COULD HAVE NOTICED. OR THE OPPOSITE, BEING SO FIGETY OR RESTLESS THAT YOU HAVE BEEN MOVING AROUND A LOT MORE THAN USUAL: NOT AT ALL
5. POOR APPETITE OR OVEREATING: NOT AT ALL
SUM OF ALL RESPONSES TO PHQ QUESTIONS 1-9: 2

## 2024-02-24 ASSESSMENT — LIFESTYLE VARIABLES: SUBSTANCE_ABUSE: 0

## 2024-02-24 ASSESSMENT — PAIN DESCRIPTION - PAIN TYPE
TYPE: ACUTE PAIN

## 2024-02-24 NOTE — PROGRESS NOTES
Report received from Yoshi Hale    Per RN, patient was hypertensive at 190's/110's  Lasix given with 2000mls output  325mg of ASA given with elevated enzyme results.     Troponin 161      Heparin [id number] in 500mls gtt at 12unit/kg/hr  Received 5000 unit bolus of heparin    EKG with no ischemic changes.     C/o mild chest pain.    158/114 currently  94% RA  99 HR   Medical transport to  patient at 1500

## 2024-02-24 NOTE — PROGRESS NOTES
Triage Officer Note    Hx cerebral venous thrombosis, CHF, CAD  Kicked out of his house and left his meds there so has been off of them for several days.  Presented to ED hypertensive, CHF exacerbation with CP.  Initial Trop WNL but repeat >100.  Coming on heparin gtts.  Got lasix prior to transfer

## 2024-02-25 ENCOUNTER — APPOINTMENT (OUTPATIENT)
Dept: CARDIOLOGY | Facility: MEDICAL CENTER | Age: 54
DRG: 321 | End: 2024-02-25
Payer: MEDICAID

## 2024-02-25 LAB
ACT BLD: 266 SEC (ref 74–137)
ACT BLD: 309 SEC (ref 74–137)
ALBUMIN SERPL BCP-MCNC: 3.3 G/DL (ref 3.2–4.9)
ALBUMIN/GLOB SERPL: 1.1 G/DL
ALP SERPL-CCNC: 93 U/L (ref 30–99)
ALT SERPL-CCNC: 23 U/L (ref 2–50)
ANION GAP SERPL CALC-SCNC: 14 MMOL/L (ref 7–16)
AST SERPL-CCNC: 36 U/L (ref 12–45)
BILIRUB SERPL-MCNC: 0.4 MG/DL (ref 0.1–1.5)
BUN SERPL-MCNC: 16 MG/DL (ref 8–22)
CALCIUM ALBUM COR SERPL-MCNC: 8.5 MG/DL (ref 8.5–10.5)
CALCIUM SERPL-MCNC: 7.9 MG/DL (ref 8.5–10.5)
CHLORIDE SERPL-SCNC: 105 MMOL/L (ref 96–112)
CHOLEST SERPL-MCNC: 207 MG/DL (ref 100–199)
CO2 SERPL-SCNC: 19 MMOL/L (ref 20–33)
CREAT SERPL-MCNC: 1.3 MG/DL (ref 0.5–1.4)
EKG IMPRESSION: NORMAL
EKG IMPRESSION: NORMAL
ERYTHROCYTE [DISTWIDTH] IN BLOOD BY AUTOMATED COUNT: 53.3 FL (ref 35.9–50)
GFR SERPLBLD CREATININE-BSD FMLA CKD-EPI: 65 ML/MIN/1.73 M 2
GLOBULIN SER CALC-MCNC: 2.9 G/DL (ref 1.9–3.5)
GLUCOSE SERPL-MCNC: 120 MG/DL (ref 65–99)
HCT VFR BLD AUTO: 47.4 % (ref 42–52)
HDLC SERPL-MCNC: 45 MG/DL
HGB BLD-MCNC: 16.3 G/DL (ref 14–18)
LDLC SERPL CALC-MCNC: 113 MG/DL
MCH RBC QN AUTO: 30.1 PG (ref 27–33)
MCHC RBC AUTO-ENTMCNC: 34.4 G/DL (ref 32.3–36.5)
MCV RBC AUTO: 87.6 FL (ref 81.4–97.8)
PLATELET # BLD AUTO: 222 K/UL (ref 164–446)
PMV BLD AUTO: 10.3 FL (ref 9–12.9)
POTASSIUM SERPL-SCNC: 4.3 MMOL/L (ref 3.6–5.5)
PROT SERPL-MCNC: 6.2 G/DL (ref 6–8.2)
RBC # BLD AUTO: 5.41 M/UL (ref 4.7–6.1)
SODIUM SERPL-SCNC: 138 MMOL/L (ref 135–145)
TRIGL SERPL-MCNC: 243 MG/DL (ref 0–149)
UFH PPP CHRO-ACNC: 0.4 IU/ML
UFH PPP CHRO-ACNC: 0.42 IU/ML
WBC # BLD AUTO: 9.7 K/UL (ref 4.8–10.8)

## 2024-02-25 PROCEDURE — A9270 NON-COVERED ITEM OR SERVICE: HCPCS | Performed by: STUDENT IN AN ORGANIZED HEALTH CARE EDUCATION/TRAINING PROGRAM

## 2024-02-25 PROCEDURE — A9270 NON-COVERED ITEM OR SERVICE: HCPCS

## 2024-02-25 PROCEDURE — 92921 PR PRQ TRLUML CORONARY ANGIOPLASTY ADDL BRANCH: CPT | Mod: LD | Performed by: INTERNAL MEDICINE

## 2024-02-25 PROCEDURE — 93010 ELECTROCARDIOGRAM REPORT: CPT | Mod: 59 | Performed by: INTERNAL MEDICINE

## 2024-02-25 PROCEDURE — 93458 L HRT ARTERY/VENTRICLE ANGIO: CPT | Mod: 26,59 | Performed by: INTERNAL MEDICINE

## 2024-02-25 PROCEDURE — 0270346 DILATION OF CORONARY ARTERY, ONE ARTERY, BIFURCATION, WITH DRUG-ELUTING INTRALUMINAL DEVICE, PERCUTANEOUS APPROACH: ICD-10-PCS | Performed by: INTERNAL MEDICINE

## 2024-02-25 PROCEDURE — 700102 HCHG RX REV CODE 250 W/ 637 OVERRIDE(OP)

## 2024-02-25 PROCEDURE — 92928 PRQ TCAT PLMT NTRAC ST 1 LES: CPT | Mod: LD | Performed by: INTERNAL MEDICINE

## 2024-02-25 PROCEDURE — 99152 MOD SED SAME PHYS/QHP 5/>YRS: CPT | Performed by: INTERNAL MEDICINE

## 2024-02-25 PROCEDURE — 92978 ENDOLUMINL IVUS OCT C 1ST: CPT | Mod: 26,LD | Performed by: INTERNAL MEDICINE

## 2024-02-25 PROCEDURE — 85027 COMPLETE CBC AUTOMATED: CPT

## 2024-02-25 PROCEDURE — 700105 HCHG RX REV CODE 258: Performed by: INTERNAL MEDICINE

## 2024-02-25 PROCEDURE — 80061 LIPID PANEL: CPT

## 2024-02-25 PROCEDURE — 02703ZZ DILATION OF CORONARY ARTERY, ONE ARTERY, PERCUTANEOUS APPROACH: ICD-10-PCS | Performed by: INTERNAL MEDICINE

## 2024-02-25 PROCEDURE — B240ZZ3 ULTRASONOGRAPHY OF SINGLE CORONARY ARTERY, INTRAVASCULAR: ICD-10-PCS | Performed by: INTERNAL MEDICINE

## 2024-02-25 PROCEDURE — A9270 NON-COVERED ITEM OR SERVICE: HCPCS | Performed by: INTERNAL MEDICINE

## 2024-02-25 PROCEDURE — 99233 SBSQ HOSP IP/OBS HIGH 50: CPT | Mod: 25 | Performed by: INTERNAL MEDICINE

## 2024-02-25 PROCEDURE — 99233 SBSQ HOSP IP/OBS HIGH 50: CPT | Performed by: INTERNAL MEDICINE

## 2024-02-25 PROCEDURE — 700102 HCHG RX REV CODE 250 W/ 637 OVERRIDE(OP): Performed by: STUDENT IN AN ORGANIZED HEALTH CARE EDUCATION/TRAINING PROGRAM

## 2024-02-25 PROCEDURE — 4A023N7 MEASUREMENT OF CARDIAC SAMPLING AND PRESSURE, LEFT HEART, PERCUTANEOUS APPROACH: ICD-10-PCS | Performed by: INTERNAL MEDICINE

## 2024-02-25 PROCEDURE — 80053 COMPREHEN METABOLIC PANEL: CPT

## 2024-02-25 PROCEDURE — 85520 HEPARIN ASSAY: CPT

## 2024-02-25 PROCEDURE — 700117 HCHG RX CONTRAST REV CODE 255: Performed by: INTERNAL MEDICINE

## 2024-02-25 PROCEDURE — 99153 MOD SED SAME PHYS/QHP EA: CPT

## 2024-02-25 PROCEDURE — 700101 HCHG RX REV CODE 250

## 2024-02-25 PROCEDURE — 700111 HCHG RX REV CODE 636 W/ 250 OVERRIDE (IP)

## 2024-02-25 PROCEDURE — 700102 HCHG RX REV CODE 250 W/ 637 OVERRIDE(OP): Performed by: INTERNAL MEDICINE

## 2024-02-25 PROCEDURE — 99406 BEHAV CHNG SMOKING 3-10 MIN: CPT | Performed by: INTERNAL MEDICINE

## 2024-02-25 PROCEDURE — 700111 HCHG RX REV CODE 636 W/ 250 OVERRIDE (IP): Mod: JZ | Performed by: INTERNAL MEDICINE

## 2024-02-25 PROCEDURE — 36415 COLL VENOUS BLD VENIPUNCTURE: CPT

## 2024-02-25 PROCEDURE — 93010 ELECTROCARDIOGRAM REPORT: CPT | Mod: 77,59 | Performed by: INTERNAL MEDICINE

## 2024-02-25 PROCEDURE — 93005 ELECTROCARDIOGRAM TRACING: CPT | Performed by: INTERNAL MEDICINE

## 2024-02-25 PROCEDURE — 770020 HCHG ROOM/CARE - TELE (206)

## 2024-02-25 PROCEDURE — B2111ZZ FLUOROSCOPY OF MULTIPLE CORONARY ARTERIES USING LOW OSMOLAR CONTRAST: ICD-10-PCS | Performed by: INTERNAL MEDICINE

## 2024-02-25 PROCEDURE — 700111 HCHG RX REV CODE 636 W/ 250 OVERRIDE (IP): Mod: JZ

## 2024-02-25 PROCEDURE — 85347 COAGULATION TIME ACTIVATED: CPT | Mod: 91

## 2024-02-25 RX ORDER — LOSARTAN POTASSIUM 25 MG/1
25 TABLET ORAL
Status: DISCONTINUED | OUTPATIENT
Start: 2024-02-25 | End: 2024-02-25

## 2024-02-25 RX ORDER — IBUPROFEN 200 MG
800 TABLET ORAL EVERY 6 HOURS PRN
Status: ON HOLD | COMMUNITY
End: 2024-02-26

## 2024-02-25 RX ORDER — MIDAZOLAM HYDROCHLORIDE 1 MG/ML
INJECTION INTRAMUSCULAR; INTRAVENOUS
Status: COMPLETED
Start: 2024-02-25 | End: 2024-02-25

## 2024-02-25 RX ORDER — LOSARTAN POTASSIUM 50 MG/1
50 TABLET ORAL
Status: DISCONTINUED | OUTPATIENT
Start: 2024-02-26 | End: 2024-02-26

## 2024-02-25 RX ORDER — VERAPAMIL HYDROCHLORIDE 2.5 MG/ML
INJECTION, SOLUTION INTRAVENOUS
Status: COMPLETED
Start: 2024-02-25 | End: 2024-02-25

## 2024-02-25 RX ORDER — HEPARIN SODIUM 1000 [USP'U]/ML
INJECTION, SOLUTION INTRAVENOUS; SUBCUTANEOUS
Status: COMPLETED
Start: 2024-02-25 | End: 2024-02-25

## 2024-02-25 RX ORDER — LORAZEPAM 1 MG/1
1 TABLET ORAL EVERY 4 HOURS PRN
Status: DISCONTINUED | OUTPATIENT
Start: 2024-02-25 | End: 2024-02-26 | Stop reason: HOSPADM

## 2024-02-25 RX ORDER — CLOPIDOGREL 300 MG/1
600 TABLET, FILM COATED ORAL ONCE
Status: DISCONTINUED | OUTPATIENT
Start: 2024-02-25 | End: 2024-02-25

## 2024-02-25 RX ORDER — OXYCODONE HYDROCHLORIDE 5 MG/1
5 TABLET ORAL EVERY 4 HOURS PRN
Status: DISCONTINUED | OUTPATIENT
Start: 2024-02-25 | End: 2024-02-26 | Stop reason: HOSPADM

## 2024-02-25 RX ORDER — SODIUM CHLORIDE 9 MG/ML
1.5 INJECTION, SOLUTION INTRAVENOUS CONTINUOUS
Status: ACTIVE | OUTPATIENT
Start: 2024-02-25 | End: 2024-02-25

## 2024-02-25 RX ORDER — DIPHENHYDRAMINE HCL 25 MG
25 CAPSULE ORAL NIGHTLY PRN
COMMUNITY

## 2024-02-25 RX ORDER — CARVEDILOL 12.5 MG/1
12.5 TABLET ORAL 2 TIMES DAILY WITH MEALS
Status: DISCONTINUED | OUTPATIENT
Start: 2024-02-25 | End: 2024-02-26 | Stop reason: HOSPADM

## 2024-02-25 RX ORDER — MORPHINE SULFATE 4 MG/ML
2 INJECTION INTRAVENOUS
Status: DISCONTINUED | OUTPATIENT
Start: 2024-02-25 | End: 2024-02-26

## 2024-02-25 RX ORDER — ASPIRIN 81 MG/1
81 TABLET ORAL DAILY
Status: DISCONTINUED | OUTPATIENT
Start: 2024-02-26 | End: 2024-02-25

## 2024-02-25 RX ORDER — OXYCODONE HYDROCHLORIDE AND ACETAMINOPHEN 5; 325 MG/1; MG/1
1 TABLET ORAL EVERY 4 HOURS PRN
Status: ON HOLD | COMMUNITY
Start: 2024-01-25 | End: 2024-02-26

## 2024-02-25 RX ORDER — LIDOCAINE HYDROCHLORIDE 20 MG/ML
INJECTION, SOLUTION INFILTRATION; PERINEURAL
Status: COMPLETED
Start: 2024-02-25 | End: 2024-02-25

## 2024-02-25 RX ORDER — HEPARIN SODIUM 200 [USP'U]/100ML
INJECTION, SOLUTION INTRAVENOUS
Status: COMPLETED
Start: 2024-02-25 | End: 2024-02-25

## 2024-02-25 RX ORDER — CLOPIDOGREL 300 MG/1
TABLET, FILM COATED ORAL
Status: COMPLETED
Start: 2024-02-25 | End: 2024-02-25

## 2024-02-25 RX ORDER — ACETAMINOPHEN 325 MG/1
650 TABLET ORAL EVERY 6 HOURS PRN
Status: DISCONTINUED | OUTPATIENT
Start: 2024-02-25 | End: 2024-02-26 | Stop reason: HOSPADM

## 2024-02-25 RX ORDER — CLOPIDOGREL BISULFATE 75 MG/1
75 TABLET ORAL DAILY
Status: DISCONTINUED | OUTPATIENT
Start: 2024-02-26 | End: 2024-02-26 | Stop reason: HOSPADM

## 2024-02-25 RX ORDER — LOSARTAN POTASSIUM 25 MG/1
25 TABLET ORAL ONCE
Status: COMPLETED | OUTPATIENT
Start: 2024-02-25 | End: 2024-02-25

## 2024-02-25 RX ADMIN — LORAZEPAM 1 MG: 1 TABLET ORAL at 23:56

## 2024-02-25 RX ADMIN — FUROSEMIDE 40 MG: 40 TABLET ORAL at 05:07

## 2024-02-25 RX ADMIN — CLOPIDOGREL BISULFATE 600 MG: 300 TABLET, FILM COATED ORAL at 10:23

## 2024-02-25 RX ADMIN — MIDAZOLAM HYDROCHLORIDE 2 MG: 2 INJECTION, SOLUTION INTRAMUSCULAR; INTRAVENOUS at 10:18

## 2024-02-25 RX ADMIN — HYDRALAZINE HYDROCHLORIDE 10 MG: 20 INJECTION INTRAMUSCULAR; INTRAVENOUS at 13:26

## 2024-02-25 RX ADMIN — CYCLOBENZAPRINE 10 MG: 10 TABLET, FILM COATED ORAL at 12:24

## 2024-02-25 RX ADMIN — LIDOCAINE HYDROCHLORIDE: 20 INJECTION, SOLUTION INFILTRATION; PERINEURAL at 10:01

## 2024-02-25 RX ADMIN — HEPARIN SODIUM: 1000 INJECTION, SOLUTION INTRAVENOUS; SUBCUTANEOUS at 10:01

## 2024-02-25 RX ADMIN — ACETAMINOPHEN 650 MG: 325 TABLET, FILM COATED ORAL at 05:06

## 2024-02-25 RX ADMIN — CARVEDILOL 12.5 MG: 12.5 TABLET, FILM COATED ORAL at 17:18

## 2024-02-25 RX ADMIN — NITROGLYCERIN 10 ML: 20 INJECTION INTRAVENOUS at 10:01

## 2024-02-25 RX ADMIN — MIDAZOLAM HYDROCHLORIDE 2 MG: 2 INJECTION, SOLUTION INTRAMUSCULAR; INTRAVENOUS at 10:23

## 2024-02-25 RX ADMIN — OXYCODONE 5 MG: 5 TABLET ORAL at 14:20

## 2024-02-25 RX ADMIN — ASPIRIN 81 MG 81 MG: 81 TABLET ORAL at 05:07

## 2024-02-25 RX ADMIN — ATORVASTATIN CALCIUM 80 MG: 80 TABLET, FILM COATED ORAL at 05:07

## 2024-02-25 RX ADMIN — OXYCODONE 5 MG: 5 TABLET ORAL at 21:35

## 2024-02-25 RX ADMIN — IOHEXOL 100 ML: 350 INJECTION, SOLUTION INTRAVENOUS at 10:44

## 2024-02-25 RX ADMIN — HEPARIN SODIUM 2000 UNITS: 200 INJECTION, SOLUTION INTRAVENOUS at 10:01

## 2024-02-25 RX ADMIN — MORPHINE SULFATE 2 MG: 4 INJECTION, SOLUTION INTRAMUSCULAR; INTRAVENOUS at 23:43

## 2024-02-25 RX ADMIN — FENTANYL CITRATE 100 MCG: 50 INJECTION, SOLUTION INTRAMUSCULAR; INTRAVENOUS at 10:18

## 2024-02-25 RX ADMIN — OMEPRAZOLE 40 MG: 20 CAPSULE, DELAYED RELEASE ORAL at 05:07

## 2024-02-25 RX ADMIN — SODIUM CHLORIDE 1.5 ML/KG/HR: 9 INJECTION, SOLUTION INTRAVENOUS at 12:20

## 2024-02-25 RX ADMIN — LORAZEPAM 1 MG: 1 TABLET ORAL at 20:33

## 2024-02-25 RX ADMIN — FENTANYL CITRATE 100 MCG: 50 INJECTION, SOLUTION INTRAMUSCULAR; INTRAVENOUS at 10:32

## 2024-02-25 RX ADMIN — ACETAMINOPHEN 650 MG: 325 TABLET, FILM COATED ORAL at 12:25

## 2024-02-25 RX ADMIN — APIXABAN 5 MG: 5 TABLET, FILM COATED ORAL at 17:18

## 2024-02-25 RX ADMIN — CARVEDILOL 12.5 MG: 12.5 TABLET, FILM COATED ORAL at 08:48

## 2024-02-25 RX ADMIN — TRAZODONE HYDROCHLORIDE 500 MG: 150 TABLET ORAL at 20:33

## 2024-02-25 RX ADMIN — LOSARTAN POTASSIUM 25 MG: 25 TABLET, FILM COATED ORAL at 11:33

## 2024-02-25 RX ADMIN — VERAPAMIL HYDROCHLORIDE 5 MG: 2.5 INJECTION, SOLUTION INTRAVENOUS at 10:01

## 2024-02-25 RX ADMIN — NITROGLYCERIN 0.4 MG: 0.4 TABLET, ORALLY DISINTEGRATING SUBLINGUAL at 23:23

## 2024-02-25 RX ADMIN — AMLODIPINE BESYLATE 10 MG: 10 TABLET ORAL at 05:07

## 2024-02-25 RX ADMIN — LOSARTAN POTASSIUM 25 MG: 25 TABLET, FILM COATED ORAL at 14:20

## 2024-02-25 ASSESSMENT — ENCOUNTER SYMPTOMS
SHORTNESS OF BREATH: 0
MYALGIAS: 0
PSYCHIATRIC NEGATIVE: 1
NEUROLOGICAL NEGATIVE: 1
NAUSEA: 0
NERVOUS/ANXIOUS: 0
CARDIOVASCULAR NEGATIVE: 1
EYES NEGATIVE: 1
COUGH: 0
FEVER: 0
DIZZINESS: 0
PALPITATIONS: 0
GASTROINTESTINAL NEGATIVE: 1
NECK PAIN: 1
CHILLS: 0
VOMITING: 0
ABDOMINAL PAIN: 0
WEAKNESS: 0
HEADACHES: 0
BRUISES/BLEEDS EASILY: 0
CONSTITUTIONAL NEGATIVE: 1
MUSCULOSKELETAL NEGATIVE: 1
RESPIRATORY NEGATIVE: 1

## 2024-02-25 ASSESSMENT — LIFESTYLE VARIABLES
ALCOHOL_USE: NO
HOW MANY TIMES IN THE PAST YEAR HAVE YOU HAD 5 OR MORE DRINKS IN A DAY: 0
CONSUMPTION TOTAL: NEGATIVE
AVERAGE NUMBER OF DAYS PER WEEK YOU HAVE A DRINK CONTAINING ALCOHOL: 0
TOTAL SCORE: 0
HAVE PEOPLE ANNOYED YOU BY CRITICIZING YOUR DRINKING: NO
TOTAL SCORE: 0
EVER FELT BAD OR GUILTY ABOUT YOUR DRINKING: NO
HAVE YOU EVER FELT YOU SHOULD CUT DOWN ON YOUR DRINKING: NO
DOES PATIENT WANT TO STOP DRINKING: NO
EVER HAD A DRINK FIRST THING IN THE MORNING TO STEADY YOUR NERVES TO GET RID OF A HANGOVER: NO
ON A TYPICAL DAY WHEN YOU DRINK ALCOHOL HOW MANY DRINKS DO YOU HAVE: 0
TOTAL SCORE: 0

## 2024-02-25 ASSESSMENT — PAIN DESCRIPTION - PAIN TYPE
TYPE: ACUTE PAIN

## 2024-02-25 ASSESSMENT — FIBROSIS 4 INDEX: FIB4 SCORE: 1.83

## 2024-02-25 NOTE — PROGRESS NOTES
Bedside report received from off going RN/tech: Flight crew, assumed care of patient.     Fall Risk Score:    Fall risk interventions in place: Provide patient/family education based on risk assessment  Bed type: Regular (Haseeb Score less than 17 interventions in place)  Patient on cardiac monitor: Yes  IVF/IV medications: Infusion per MAR (List Med(s)) Heparin  Oxygen: Room Air  Bedside sitter: Not Applicable   Isolation: Not applicable

## 2024-02-25 NOTE — PROCEDURES
Cardiac Catheterization and Percutaneous Intervention Procedure Report    2/25/2024    Referring MD: 2/25/24    Primary Care Provider: TONYA Bob    Indication for procedure: ACS <24 hours    Procedures:  Coronary arteriograms  Left heart catheterization  IVUS guided angioplasty and placement of a 4.5 by 12mm Synergy megatron drug-eluting stent in proximal  left anterior descending coronary artery.  Angioplasty of first diagonal branch    Final diagnosis:   single vessel coronary artery disease.  Successful percutaneous intervention of left anterior descending coronary artery.    Recommendations: Guideline directed medical therapy and risk factor management      Coronary arteriograms:  Left main: normal  Left anterior descending: Prior stent in mid LAD, proximal portion and proximal edge of stent with severe stenosis 80% stenosis, this involves first diagonal branch, which has 80% stenosis in proximal portion.  Left circumflex: luminal irregularities.   Right coronary: luminal irregularities, dominant    Left Heart Catheterization:  Left Ventriculogram: Not performed  Left Ventricular EDP: 22 mm Hg   Aortic Valve Gradient: No significant AV gradient noted    Procedure details:  Divine Lugo was brought to the cardiac catheterization lab where the right wrist was prepped and draped in the usual manner for cardiac catheterization.  Timeout was performed.  The area was anesthetized with lidocaine and a 5/6 FR sheath was inserted into the right radial artery without difficulty. A #3.5 left Maulik catheter was advanced to the ostia of the Left coronary artery and arteriograms were recorded.   A #4 right Maulik catheter was advanced to the ostia of the right coronary artery and arteriograms were recorded. Aortic valve was crossed using #4 right Maulik catheter left heart catheterization was performed.  Patient underwent percutaneous coronary revascularization as outlined below.  At the  "completion of the case the sheath was removed and hemostasis achieved utilizing a radial compression band .  Patient was pain-free and hemodynamically stable at the completion of the case.  There were no apparent complications.    Interventional Procedure:     Given the patient's clinical presentation and coronary anatomy, PCI was indicated and we proceeded with the intervention as detailed below.    Indication for PCI:  NSTE- ACD    Pre: 80 %, 15 mm length, ROMI 3 flow  Post: 0%, ROMI 3 flow    Lesion complexity  High  Severe calcification yes  Bifurcation  yes    Guide catheter: JL 3.5 was advanced to the ostia of the left coronary artery.    Guide wire: A 0.014\" mm  Runthrough was advanced into the artery and crossed the lesion.    IVUS was performed, vessel is 4 mm distally, 4.5 mm proximally. Looks like 2 layers of stent, stenosis in proximal portion of stent and proximal edge.    Balloon pre-dilatation: lesion was dilated with 3.5X10 mm wolverine and 4.0X20 mm NC balloon at 14 atms.      Stent: A 4.5 by 12 mm Synergy Megatron drug-eluting  stent was deployed in proximal  left anterior descending coronary artery at 14 CHEL proximal to prior stent in overlapping fashion.    Diagonal branch was re crossed and dilated with 2.0x20 mm balloon at 10 atms, residual stenosis ,40%. Given his in-stent restenosis issue, additional stents are not placed in diagonal    Anticoagulant: Heparin  Antiplatelet: clopidogrel  EBL <25 cc  Complications: none  Specimens: none  Contrast: 100 cc  Fluorotime : see cath lab flowsheet      Sedation: I supervised moderate sedation over a trained independent observer.    Sedation start time: 10:00  End time: 10:45      Electronically signed by   Alvaro Wharton M.D., MATHEUS  Interventional cardiologist  2/25/2024  10:51 AM          "

## 2024-02-25 NOTE — CONSULTS
Cardiology Consult Note    DOS: 2/24/2024    Consulting physician: Dr. Roldan    Chief complaint/Reason for consult: NSTEMI    HPI:  Pt is a 54 yo M. He has extensive medical history. Was living in [place] for a year or so. Currently resides in [facility] but was kicked out of his rental unit with his girlfriend, now lives out of his mobile home until he gets access back to the unit in a few weeks. Lost access to his medications. He has history of CAD and prior MI and what he describes as cardiac arrest in [place], though he does not have ICD in place. Has history of prior PCI, last one I see for ISR in 2021 with Dr. Gerardo. He is a poor historian and has history of CVA due to cerebral venous thrombosis.    Started having CP earlier today. Off/on, left sided, non-radiating, better with morphine. On interview, pain still there but he appears comfortable. Trop gone from 157 on arrival to 212 consistent with probable ACS. On heparin gtt.      ROS (+ highlighted in red):  Constitutional: Fevers/chills/fatigue/weightloss  HEENT: Blurry vision/eye pain/sore throat/hearing loss  Respiratory: Shortness of breath/cough  Cardiovascular: Chest pain/palpitations/edema/orthopnea/syncope  GI: Nausea/vomitting/diarrhea  MSK: Arthralgias/myagias/muscle weakness  Skin: Rash/sores  Neurological: Numbness/tremors/vertigo  Endocrine: Excessive thirst/polyuria/cold intolerance/heat intolerance  Psych: Depression/anxiety    Past Medical History:   Diagnosis Date    Angina     Arthritis     Back fracture     T12 THRU LUMBER FRACTURED         Backpain     CAD (coronary artery disease)     mi    Cancer (HCC)     DVT     Fracture closed, pelvis     RIGHT    HTN (hypertension) 06/07/2012    Hypertension     Lipoma NOS     Myocardial infarct (HCC)     Nephrolithiasis 06/07/2012    Nonmalignant tumors     LUNG NECK SHOULDER    Personal history of venous thrombosis and embolism     PE, RLE DVT    Pneumonia     Pulmonary embolism (HCC)      Renal disorder     KIDNEY STONES    Stroke (formerly [facility])     Stroke determined by clinical assessment (formerly [facility]) 11/21/2023    Unspecified urinary incontinence PE       Past Surgical History:   Procedure Laterality Date    CYSTOSCOPY STENT PLACEMENT  6/3/2012    Performed by JAVIER MEDEIROS at SURGERY [facility] ORS    URETEROSCOPY  6/3/2012    Performed by JAVIER MEDEIROS at SURGERY [facility] ORS    LASERTRIPSY  6/3/2012    Performed by JAVIER MEDEIROS at SURGERY [facility] ORS    OTHER      TUMOR REMOVAL X5 ON BACK    OTHER CARDIAC SURGERY      cath here on 6/29    OTHER ORTHOPEDIC SURGERY      wrist, knee, back       Social History     Socioeconomic History    Marital status: Single     Spouse name: Not on file    Number of children: Not on file    Years of education: Not on file    Highest education level: Not on file   Occupational History    Not on file   Tobacco Use    Smoking status: Every Day     Types: Cigarettes     Passive exposure: Current    Smokeless tobacco: Current     Types: Chew    Tobacco comments:      chews/1 can q 2 days, smoke 2  cigs per day   Vaping Use    Vaping Use: Never used   Substance and Sexual Activity    Alcohol use: No     Comment: 23 year ago quit    Drug use: Yes     Types: Marijuana     Comment: at bedtime    Sexual activity: Not on file   Other Topics Concern    Not on file   Social History Narrative    ** Merged History Encounter **          Social Determinants of Health     Financial Resource Strain: Not on file   Food Insecurity: Not on file   Transportation Needs: Not on file   Physical Activity: Not on file   Stress: Not on file   Social Connections: Not on file   Intimate Partner Violence: Not on file   Housing Stability: Not on file       Family History   Problem Relation Age of Onset    Dementia Mother        Allergies   Allergen Reactions    Gabapentin Unspecified     Causes patient to seizures    Bloodless      Pt states he would accept blood in life  or death situation    Depakote [Valproic Acid] Unspecified     Non epileptic seizure ( stated by patient )    Ibuprofen      Note: Unknown.    Lisinopril      Note: Built up in system and caused overdose. Body not able to process medication.    Topiramate      Note: Unknown.       Current Facility-Administered Medications   Medication Dose Route Frequency Provider Last Rate Last Admin    heparin infusion 25,000 units in 500 mL 0.45% NACL  0-30 Units/kg/hr (Adjusted) Intravenous Continuous Robert Juarez M.D. 26.6 mL/hr at 02/24/24 1907 16 Units/kg/hr at 02/24/24 1907    heparin injection 2,500 Units  30 Units/kg (Adjusted) Intravenous PRN Robert Juarez M.D.   2,500 Units at 02/24/24 1902    [START ON 2/25/2024] amLODIPine (Norvasc) tablet 10 mg  10 mg Oral DAILY Robert Juarez M.D.        [START ON 2/25/2024] aspirin (Asa) chewable tab 81 mg  81 mg Oral DAILY Robert Juarez M.D.        [START ON 2/25/2024] atorvastatin (Lipitor) tablet 80 mg  80 mg Oral DAILY Robert Juarez M.D.        carvedilol (Coreg) tablet 6.25 mg  6.25 mg Oral BID WITH MEALS Robert Juarez M.D.        cyclobenzaprine (Flexeril) tablet 10 mg  10 mg Oral BID PRN Robert Juarez M.D.        nitroglycerin (Nitrostat) tablet 0.4 mg  0.4 mg Sublingual PRN Robert Juarez M.D.        [START ON 2/25/2024] omeprazole (PriLOSEC) capsule 40 mg  40 mg Oral DAILY Robert Juarez M.D.        acetaminophen (Tylenol) tablet 650 mg  650 mg Oral Q6HRS PRN Robert Juarez M.D.   650 mg at 02/24/24 1902    [START ON 2/25/2024] furosemide (Lasix) tablet 40 mg  40 mg Oral DAILY Robert Juarez M.D.        hydrALAZINE (Apresoline) injection 10 mg  10 mg Intravenous Q6HRS PRN Robert Juarez M.D.        ondansetron (Zofran) syringe/vial injection 4 mg  4 mg Intravenous Q4HRS PRN Robert Juarez M.D.        traZODone (Desyrel) tablet 500 mg  500 mg Oral QHS Robert Juarez M.D.           Physical Exam:  Vitals:    02/24/24 1708 02/24/24 1730   BP:  (!) 176/114  "  Pulse:  (!) 104   Resp:  18   Temp:  36.7 °C (98.1 °F)   TempSrc:  Temporal   SpO2:  93%   Weight: 104 kg (230 lb 6.1 oz) 104 kg (230 lb 6.1 oz)   Height: 1.727 m (5' 7.99\")      General appearance: NAD, conversant   Eyes: anicteric sclerae, moist conjunctivae; no lid-lag; PERRLA  HENT: Atraumatic; oropharynx clear with moist mucous membranes  Neck: Trachea midline; FROM, supple, no thyromegaly or lymphadenopathy  Lungs: CTA, with normal respiratory effort and no intercostal retractions  CV: RRR, no MRGs, no JVD   Abdomen: Soft, non-tender; no masses or HSM  Extremities: No peripheral edema or extremity lymphadenopathy  Skin: Normal temperature, turgor and texture; no rash, ulcers or subcutaneous nodules  Psych: Appropriate affect, alert and oriented to person, place and time    Data:  Labs reviewed:  Trop 150s->200s    Prior echo/stress results reviewed:  LVEF 55% in 11/23    Prior cath results reviewed:    CXR interpreted by me:    EKG interpreted by me:   Sinus, old anterior infarct, old inferior infarct    Impression/Plan:  1) NSTEMI  2) CAD s/p prior MI  3) History of stroke  4) HTN    -Symptoms and biomarkers consistent w probable ACS  -His CP seems reasonably controlled for now and can be managed medically overnight I think  -No evidence of transmural involvement on EKG   -Agree with IV heparin gtt  -Plan for coronary angiography + potential revasc if appropriate tomorrow, risks/benefits discussed with him and he is agreeable to proceed  -Keep NPO after midnight    Mirza Atkins MD    "

## 2024-02-25 NOTE — PROGRESS NOTES
0706  - Report received from Nancy PHILLIPS at patient's bedside. Patient resting in bed quietly with no complaints at this time. Telemetry monitor intact et functioning. Call light and belongings within reach, safety measures intact, white board updated.     0945 - Notified telemetry monitoring that patient is going off the floor to cath lab. Patient off floor to cath lab.     1100 - Patient back from cath lab. Verified with tele monitoring that patient is on box and visible.     1928 - Reported off to Víctor PHILLIPS at patient's bedside. Stat EKG ordered as patient c/o chest pain. EKG tech notified.

## 2024-02-25 NOTE — PROGRESS NOTES
Hospital Medicine Daily Progress Note    Date of Service  2/25/2024    Chief Complaint  Divine Lugo is a 53 y.o. male admitted 2/24/2024 with chest pain    Hospital Course  53 y.o. male with past medical history of CAD status post multiple stents, prior CVA on apixaban, congestive heart failure who presented 2/24/2024 with chest pain.  Patient was a transfer here from [place] for concerns with NSTEMI.  Patient initially presented with concerns of chest pain that started at approximately 5 AM.  States that it is substernal in nature, more localized on the left side of his chest.  He states that it is nonradiating.  He has had multiple MIs in the past, and states that this feels the same.  He has had minimal improvement with nitroglycerin and pain medications.  At the outside facility EKG was unremarkable, without any ST segment elevation or depression.  Initial troponin was in the 50s, but on repeat increased to the 160s.  He was subsequently started on a heparin drip and transferred to [facility].  Patient still complains of chest pain, but states that it is slightly improved.  Cardiology has been consulted and will come evaluate the patient.     Interval Problem Update  Patient underwent cardiac catheterization today which revealed 80% stenosis in proximal LAD which was stented. He has been started on Plavix. His BP has been uncontrolled. Increase losartan to 50 mg daily. IV hydralazine PRN. Pt reports worsening of his chronic neck pain. I have ordered oxycodone. Case discussed with Cardiology, per Dr Shaji amin to resume Eliquis tonight and DC aspirin. Patient will be continued on Plavix and Eliquis     I have discussed this patient's plan of care and discharge plan at IDT rounds today with Case Management, Nursing, Nursing leadership, and other members of the IDT team.    Consultants/Specialty  cardiology    Code Status  Full Code    Disposition  The patient is not medically cleared  for discharge to home or a post-acute facility.      I have placed the appropriate orders for post-discharge needs.    Review of Systems  Review of Systems   Cardiovascular:  Positive for chest pain.   Musculoskeletal:  Positive for neck pain.        Physical Exam  Temp:  [36.2 °C (97.2 °F)-36.8 °C (98.3 °F)] (P) 36.2 °C (97.2 °F)  Pulse:  [] (P) 77  Resp:  [18] (P) 18  BP: (135-176)/() (P) 169/112  SpO2:  [91 %-95 %] (P) 93 %    Physical Exam  Vitals and nursing note reviewed.   Constitutional:       General: He is not in acute distress.  HENT:      Head: Normocephalic.      Mouth/Throat:      Mouth: Mucous membranes are moist.   Eyes:      Pupils: Pupils are equal, round, and reactive to light.   Cardiovascular:      Rate and Rhythm: Normal rate and regular rhythm.      Pulses: Normal pulses.      Heart sounds: Normal heart sounds.   Pulmonary:      Effort: Pulmonary effort is normal.      Breath sounds: Normal breath sounds.   Abdominal:      Palpations: Abdomen is soft.      Tenderness: There is no abdominal tenderness.   Musculoskeletal:         General: No swelling.      Cervical back: Neck supple.   Skin:     General: Skin is warm.      Coloration: Skin is not jaundiced.   Neurological:      General: No focal deficit present.      Mental Status: He is alert and oriented to person, place, and time.   Psychiatric:         Mood and Affect: Mood normal.         Behavior: Behavior normal.         Fluids    Intake/Output Summary (Last 24 hours) at 2/25/2024 1344  Last data filed at 2/25/2024 1200  Gross per 24 hour   Intake 665.97 ml   Output 1250 ml   Net -584.03 ml       Laboratory  Recent Labs     02/24/24  1728 02/25/24  0106   WBC 9.2 9.7   RBC 5.81 5.41   HEMOGLOBIN 17.6 16.3   HEMATOCRIT 50.4 47.4   MCV 86.7 87.6   MCH 30.3 30.1   MCHC 34.9 34.4   RDW 51.5* 53.3*   PLATELETCT 217 222   MPV 10.0 10.3     Recent Labs     02/24/24  1728 02/25/24  0106   SODIUM 140 138   POTASSIUM 4.0 4.3   CHLORIDE  105 105   CO2 22 19*   GLUCOSE 122* 120*   BUN 14 16   CREATININE 1.17 1.30   CALCIUM 8.5 7.9*     Recent Labs     02/24/24  1728   APTT 35.9   INR 0.89         Recent Labs     02/25/24  0106   TRIGLYCERIDE 243*   HDL 45   *       Imaging  [id number]   Final Result      Periapical lucency involving the right mandibular canine.      CL-LEFT HEART CATHETERIZATION WITH POSSIBLE INTERVENTION    (Results Pending)        Assessment/Plan  * NSTEMI (non-ST elevated myocardial infarction) (HCC)- (present on admission)  Assessment & Plan  Patient with known history of CHF and CAD, status post multiple stents in the past, last send proximally 2 years ago.  Now presenting with chest pain  Initial troponin at outside facility was approximate 54, and on 2-hour repeat was 161.  EKG without any ST segment elevation or depression  S/p cardiac cath and MAYRA to proximal LAD  Continue with Eliquis, plavix and high intensity statin        Tobacco abuse- (present on admission)  Assessment & Plan  Patient smokes < 1PPD.  Nicotine patch and/or gum offered.   I discussed cessation with patient including starting on nicotine patch and/or gum on discharge.  I also discussed medications to help with cessation with patient including Wellbutrin and Chantix, offered .  Smoking cessation discussed with patient for 4 minutes.     Pain, dental- (present on admission)  Assessment & Plan  Patient states that he pulled out his own teeth using pliers  Follow-up mandible panoramic    Elevated troponin- (present on admission)  Assessment & Plan  Elevated troponin outside facility.  Follow-up repeat troponin here    Coronary artery disease involving native coronary artery of native heart with refractory angina pectoris (HCC)- (present on admission)  Assessment & Plan  History of CAD status post stents.  Last 2 years ago  Continue with Eliquis, plavix and statin  Cardiology consulted, patient underwent cardiac cath which revealed 80%  stenosis of proximal LAD, MAYRA placed  Started on Plavix      Right sided weakness- (present on admission)  Assessment & Plan  Patient with history of prior strokes with residual right-sided weakness.  Normally takes apixaban along with aspirin    Primary hypertension- (present on admission)  Assessment & Plan  Uncontrolled  Increase losartan to 50 mg daily  Continue coreg and amlodipine  IV hydralazine PRN          VTE prophylaxis: heparin    I have performed a physical exam and reviewed and updated ROS and Plan today (2/25/2024). In review of yesterday's note (2/24/2024), there are no changes except as documented above.    I spent 55 minutes, reviewing the chart, obtaining and/or reviewing separately obtained history. Performing a medically appropriate examination and evaluation.  Counseling and educating the patient. Ordering and reviewing medications, tests, or procedures.  Discussing the case with Cardiology.  Documenting clinical information in EPIC. Independently interpreting results and communicating results to patient. Discussing future disposition of care with patient, RN and case management.

## 2024-02-25 NOTE — CARE PLAN
The patient is Watcher - Medium risk of patient condition declining or worsening    Shift Goals  Clinical Goals: (P) start heparin drip, therapeutic heparin, VSS  Patient Goals: (P) rest, feel beter  Family Goals: (P) leigh ann    Progress made toward(s) clinical / shift goals:  elevated troponin, heparin gtt      Problem: Knowledge Deficit - Standard  Goal: Patient and family/care givers will demonstrate understanding of plan of care, disease process/condition, diagnostic tests and medications  Outcome: Progressing     Problem: Pain - Standard  Goal: Alleviation of pain or a reduction in pain to the patient’s comfort goal  Outcome: Progressing     Problem: Optimal Care for the Acute Coronary Syndrome Patient  Goal: Optimal Care for the Acute Coronary Syndrome Patient  Outcome: Progressing  Goal: Potential Interventions for the Acute Coronary Syndrome Patient  Outcome: Progressing     Problem: Optimal Care for the Cath Lab Patient  Goal: Optimal Care for the Cath Lab Patient  Outcome: Progressing     Problem: Respiratory  Goal: Patient will achieve/maintain optimum respiratory ventilation and gas exchange  Outcome: Progressing     Problem: Hemodynamics  Goal: Patient's hemodynamics, fluid balance and neurologic status will be stable or improve  Outcome: Progressing       Patient is not progressing towards the following goals:

## 2024-02-25 NOTE — CARE PLAN
The patient is Stable - Low risk of patient condition declining or worsening    Shift Goals  Clinical Goals: maintain heparin drip, therapeutic Xa, safety, pain control  Patient Goals: pain control, feel better  Family Goals: leigh ann    Progress made toward(s) clinical / shift goals:    Problem: Knowledge Deficit - Standard  Goal: Patient and family/care givers will demonstrate understanding of plan of care, disease process/condition, diagnostic tests and medications  Outcome: Progressing  Note: POC discussed with pt, all questions answered at this time.       Problem: Pain - Standard  Goal: Alleviation of pain or a reduction in pain to the patient’s comfort goal  Outcome: Progressing  Note: Patient using numbers scale to rate pain. Non-pharm techniques used to relieve discomfort. PRNs given appropriately at request         Patient is not progressing towards the following goals:

## 2024-02-25 NOTE — ASSESSMENT & PLAN NOTE
History of CAD status post stents.  Last 2 years ago  Continue with Eliquis, plavix and statin  Cardiology consulted, patient underwent cardiac cath which revealed 80% stenosis of proximal LAD, MAYRA placed  Started on Plavix

## 2024-02-25 NOTE — PROGRESS NOTES
Cardiology Follow Up Progress Note    Date of Service  2/25/2024    Attending Physician  Roel Still M.D.    Chief Complaint   Chest pain,     HPI  Divine Lugo is a 53 y.o. male admitted 2/24/2024 with above.    Interim Events  No significant bradycardia noted within the past 24 hours.    Review of Systems  Review of Systems   Constitutional: Negative.  Negative for chills and fever.   HENT: Negative.  Negative for hearing loss.    Eyes: Negative.    Respiratory: Negative.  Negative for cough and shortness of breath.    Cardiovascular: Negative.  Negative for chest pain, palpitations and leg swelling.   Gastrointestinal: Negative.  Negative for abdominal pain, nausea and vomiting.   Genitourinary: Negative.  Negative for dysuria and urgency.   Musculoskeletal: Negative.  Negative for myalgias.   Skin: Negative.  Negative for rash.   Neurological: Negative.  Negative for dizziness, weakness and headaches.   Hematological:  Does not bruise/bleed easily.   Psychiatric/Behavioral: Negative.  The patient is not nervous/anxious.        Vital signs in last 24 hours  Temp:  [36.6 °C (97.9 °F)-36.8 °C (98.3 °F)] (P) 36.8 °C (98.3 °F)  Pulse:  [] (P) 77  Resp:  [18] (P) 18  BP: (148-176)/() (P) 155/101  SpO2:  [93 %-95 %] (P) 93 %    Physical Exam  Physical Exam  Constitutional:       Appearance: Normal appearance. He is well-developed and normal weight.   HENT:      Head: Normocephalic and atraumatic.      Mouth/Throat:      Mouth: Mucous membranes are moist.   Eyes:      Extraocular Movements: Extraocular movements intact.      Conjunctiva/sclera: Conjunctivae normal.   Cardiovascular:      Rate and Rhythm: Normal rate and regular rhythm.      Pulses: Normal pulses.      Heart sounds: Normal heart sounds.   Pulmonary:      Effort: Pulmonary effort is normal.      Breath sounds: Normal breath sounds.   Abdominal:      General: Bowel sounds are normal.      Palpations: Abdomen is soft.   Musculoskeletal:  "        General: Normal range of motion.      Cervical back: Normal range of motion and neck supple.   Skin:     General: Skin is warm and dry.   Neurological:      General: No focal deficit present.      Mental Status: He is alert and oriented to person, place, and time. Mental status is at baseline.   Psychiatric:         Mood and Affect: Mood normal.         Behavior: Behavior normal.         Thought Content: Thought content normal.         Judgment: Judgment normal.         Lab Review  Lab Results   Component Value Date/Time    WBC 9.7 02/25/2024 01:06 AM    RBC 5.41 02/25/2024 01:06 AM    HEMOGLOBIN 16.3 02/25/2024 01:06 AM    HEMATOCRIT 47.4 02/25/2024 01:06 AM    MCV 87.6 02/25/2024 01:06 AM    MCH 30.1 02/25/2024 01:06 AM    MCHC 34.4 02/25/2024 01:06 AM    MPV 10.3 02/25/2024 01:06 AM      Lab Results   Component Value Date/Time    SODIUM 138 02/25/2024 01:06 AM    POTASSIUM 4.3 02/25/2024 01:06 AM    CHLORIDE 105 02/25/2024 01:06 AM    CO2 19 (L) 02/25/2024 01:06 AM    GLUCOSE 120 (H) 02/25/2024 01:06 AM    BUN 16 02/25/2024 01:06 AM    CREATININE 1.30 02/25/2024 01:06 AM    GLOMRATE 49 (L) 11/03/2023 02:57 PM      Lab Results   Component Value Date/Time    ASTSGOT 36 02/25/2024 01:06 AM    ALTSGPT 23 02/25/2024 01:06 AM     Lab Results   Component Value Date/Time    CHOLSTRLTOT 207 (H) 02/25/2024 01:06 AM     (H) 02/25/2024 01:06 AM    HDL 45 02/25/2024 01:06 AM    TRIGLYCERIDE 243 (H) 02/25/2024 01:06 AM    TROPONINT 212 (H) 02/24/2024 07:20 PM       No results for input(s): \"NTPROBNP\" in the last 72 hours.  (Above labs reviewed.)       Current Facility-Administered Medications:     carvedilol (Coreg) tablet 12.5 mg, 12.5 mg, Oral, BID WITH MEALS, Jo JARET Coreahutina, A.P.R.N., 12.5 mg at 02/25/24 0848    losartan (Cozaar) tablet 25 mg, 25 mg, Oral, Q DAY, Jo Holland, A.P.R.N., 25 mg at 02/25/24 1133    amLODIPine (Norvasc) tablet 10 mg, 10 mg, Oral, DAILY, Robert Juarez M.D., 10 mg at " 02/25/24 0507    atorvastatin (Lipitor) tablet 80 mg, 80 mg, Oral, DAILY, Robert Juarez M.D., 80 mg at 02/25/24 0507    cyclobenzaprine (Flexeril) tablet 10 mg, 10 mg, Oral, BID PRN, Robert Juarez M.D.    nitroglycerin (Nitrostat) tablet 0.4 mg, 0.4 mg, Sublingual, PRN, Robert Juarez M.D.    omeprazole (PriLOSEC) capsule 40 mg, 40 mg, Oral, DAILY, Robert Juarez M.D., 40 mg at 02/25/24 0507    acetaminophen (Tylenol) tablet 650 mg, 650 mg, Oral, Q6HRS PRN, Robert Juarez M.D., 650 mg at 02/25/24 0506    furosemide (Lasix) tablet 40 mg, 40 mg, Oral, DAILY, Robert Juarez M.D., 40 mg at 02/25/24 0507    hydrALAZINE (Apresoline) injection 10 mg, 10 mg, Intravenous, Q6HRS PRN, Robert Juarez M.D.    ondansetron (Zofran) syringe/vial injection 4 mg, 4 mg, Intravenous, Q4HRS PRN, Robert Juarez M.D.    traZODone (Desyrel) tablet 500 mg, 500 mg, Oral, QHS, Robert Juarez M.D., 500 mg at 02/24/24 2143  (Medications reviewed.)    Cardiac Imaging and Procedures Review  CARDIAC STUDIES/PROCEDURES:    ECHOCARDIOGRAM CONCLUSIONS (11/24/23)  Normal left ventricular systolic function.  Mild aortic insufficiency.  (study result reviewed)     EKG performed on (02/24/24) was reviewed: EKG personally interpreted shows sinus rhythm with anterior and inferior Q waves.    Assessment/Plan  NSTEMI, chest pain, coronary artery disease with prior stent placements: He is experiencing chest pain as described. We will schedule for cardiac catheterization.  He understands the risks and benefits and agrees with plan.  Hypertension and hyperlipidemia: Stable on medical therapy.  History of previous stroke treated with chronic anticoagulation therapy (Eliquis): He remains clinically stable without recurrence of stroke symptoms.   Chronic tobacco use: Smoking cessation recommended with discussions of health effects and plans for over 3 minutes.    Thank you for allowing me to participate in the care of this patient.  I will continue to  follow this patient    Please contact me with any questions.    Jad Girard M.D.   Cardiologist, [facility]  [phone]

## 2024-02-25 NOTE — H&P
Hospital Medicine History & Physical Note    Date of Service  2/24/2024    Primary Care Physician  PHILLIP Bob.    Consultants  cardiology    Specialist Names: Sun    Code Status  Full Code    Chief Complaint  No chief complaint on file.      History of Presenting Illness  Divine Lugo is a 53 y.o. male with past medical history of CAD status post multiple stents, prior CVA on apixaban, congestive heart failure who presented 2/24/2024 with chest pain.  Patient was a transfer here from [place] for concerns with NSTEMI.  Patient initially presented with concerns of chest pain that started at approximately 5 AM.  States that it is substernal in nature, more localized on the left side of his chest.  He states that it is nonradiating.  He has had multiple MIs in the past, and states that this feels the same.  He has had minimal improvement with nitroglycerin and pain medications.  At the outside facility EKG was unremarkable, without any ST segment elevation or depression.  Initial troponin was in the 50s, but on repeat increased to the 160s.  He was subsequently started on a heparin drip and transferred to [facility].  Patient still complains of chest pain, but states that it is slightly improved.  Cardiology has been consulted and will come evaluate the patient.    I discussed the plan of care with patient.    Review of Systems  Review of Systems   Constitutional:  Negative for chills and fever.   HENT:  Negative for congestion, ear discharge, nosebleeds and sinus pain.    Eyes:  Negative for double vision, photophobia, pain and discharge.   Respiratory:  Negative for hemoptysis, sputum production and shortness of breath.    Cardiovascular:  Positive for chest pain. Negative for palpitations, orthopnea, claudication and leg swelling.   Gastrointestinal:  Negative for abdominal pain, diarrhea, nausea and vomiting.   Genitourinary:  Negative for frequency, hematuria and  urgency.   Musculoskeletal:  Negative for back pain and neck pain.   Neurological:  Positive for weakness. Negative for dizziness, tingling, tremors, sensory change, focal weakness and headaches.   Psychiatric/Behavioral:  Negative for hallucinations and substance abuse. The patient is not nervous/anxious and does not have insomnia.        Past Medical History   has a past medical history of Angina, Arthritis, Back fracture, Backpain, CAD (coronary artery disease), Cancer ([facility]), DVT, Fracture closed, pelvis, HTN (hypertension) (06/07/2012), Hypertension, Lipoma NOS, Myocardial infarct ([facility]), Nephrolithiasis (06/07/2012), Nonmalignant tumors, Personal history of venous thrombosis and embolism, Pneumonia, Pulmonary embolism ([facility]), Renal disorder, Stroke ([facility]), Stroke determined by clinical assessment ([facility]) (11/21/2023), and Unspecified urinary incontinence (PE).    Surgical History   has a past surgical history that includes other orthopedic surgery; other; other cardiac surgery; cystoscopy stent placement (6/3/2012); ureteroscopy (6/3/2012); and lasertripsy (6/3/2012).     Family History  family history includes Dementia in his mother.   Family history reviewed with patient. There is no family history that is pertinent to the chief complaint.     Social History   reports that he has been smoking cigarettes. He has been exposed to tobacco smoke. His smokeless tobacco use includes chew. He reports current drug use. Drug: Marijuana. He reports that he does not drink alcohol.    Allergies  Allergies   Allergen Reactions    Gabapentin Unspecified     Causes patient to seizures    Bloodless      Pt states he would accept blood in life or death situation    Depakote [Valproic Acid] Unspecified     Non epileptic seizure ( stated by patient )    Ibuprofen      Note: Unknown.    Lisinopril      Note: Built up in system and caused overdose. Body not able to process medication.    Topiramate      Note: Unknown.        Medications  Prior to Admission Medications   Prescriptions Last Dose Informant Patient Reported? Taking?   EPINEPHrine (EPIPEN) 0.3 MG/0.3ML Solution Auto-injector solution for injection  Patient No No   Sig: INJECT CONTENTS OF 1 PEN SUBCUTANEOUSLY AS NEEDED FOR ALLERGIC REACTION MAY REPEAT DOSE AFTER 5 TO 15 MINTUTES   acetaminophen (TYLENOL) 500 MG Tab   No No   Sig: Take 2 Tablets by mouth 3 times a day as needed for Mild Pain.   amLODIPine (NORVASC) 10 MG Tab  Patient Yes No   Sig: Take 10 mg by mouth every day.   apixaban (ELIQUIS) 5mg Tab   Yes No   Sig: Take 5 mg by mouth 2 times a day.   aspirin (ASA) 81 MG Chew Tab chewable tablet   Yes No   Sig: Chew 81 mg every day.   atorvastatin (LIPITOR) 80 MG tablet  Patient No No   Sig: Take 1 Tablet by mouth every day.   busPIRone (BUSPAR) 7.5 MG tablet   No No   Sig: Take 1 Tablet by mouth 2 times a day.   carvedilol (COREG) 6.25 MG Tab  Patient Yes No   Sig: Take 6.25 mg by mouth 2 times a day.   cyclobenzaprine (FLEXERIL) 10 mg Tab   No No   Sig: Take 1 Tablet by mouth 2 times a day as needed for Muscle Spasms.   furosemide (LASIX) 40 MG Tab  Patient Yes No   Sig: Take 40 mg by mouth every day.   lisinopril (PRINIVIL) 40 MG tablet  Patient Yes No   Sig: Take 40 mg by mouth every day.   nitroglycerin (NITROSTAT) 0.4 MG SL Tab  Patient No No   Sig: Place 1 Tablet under the tongue as needed for Chest Pain for up to 30 days. DISSOLVE ONE TABLET UNDER THE TONGUE EVERY 5 MINUTES AS NEEDED FOR CHEST PAIN DO NOT EXCEED A TOTAL OF 3 DOSES IN 15 MINUTES   omeprazole (PRILOSEC) 40 MG delayed-release capsule  Patient Yes No   Sig: Take 40 mg by mouth every day.   ondansetron (ZOFRAN ODT) 4 MG TABLET DISPERSIBLE  Patient No No   Sig: Take 1 Tablet by mouth every 8 hours as needed for Nausea/Vomiting.   potassium chloride SA (KDUR) 20 MEQ Tab CR  Patient Yes No   Sig: Take 20 mEq by mouth every day.   prazosin (MINIPRESS) 2 MG Cap  Patient Yes No   Sig: Take 2 mg by  mouth every evening.   topiramate (TOPAMAX) 25 MG Tab   No No   Sig: Week 1: 1 pill daily Week 2: 1 pill twice daily Week 3: 2 pills in the morning and 1 pill in the evening Week 4: 2 pills twice daily   trazodone (DESYREL) 300 MG tablet  Patient Yes No   Sig: Take 300 mg by mouth every evening. Take with 200 mg (2 tabs of 100 mg) for total dose of 500 mg      Facility-Administered Medications: None       Physical Exam                             Physical Exam  Constitutional:       General: He is not in acute distress.     Appearance: Normal appearance. He is normal weight. He is not ill-appearing, toxic-appearing or diaphoretic.   HENT:      Head: Normocephalic and atraumatic.      Mouth/Throat:      Mouth: Mucous membranes are moist.   Eyes:      Pupils: Pupils are equal, round, and reactive to light.   Cardiovascular:      Rate and Rhythm: Normal rate and regular rhythm.      Pulses: Normal pulses.      Heart sounds: Normal heart sounds. No murmur heard.     No friction rub. No gallop.   Pulmonary:      Effort: Pulmonary effort is normal. No respiratory distress.      Breath sounds: Normal breath sounds. No stridor. No wheezing, rhonchi or rales.   Chest:      Chest wall: No tenderness.   Abdominal:      General: There is no distension.      Palpations: There is no mass.      Tenderness: There is no abdominal tenderness. There is no guarding or rebound.      Hernia: No hernia is present.   Musculoskeletal:         General: No swelling, tenderness, deformity or signs of injury.      Right lower leg: No edema.      Left lower leg: No edema.   Skin:     General: Skin is warm and dry.      Capillary Refill: Capillary refill takes less than 2 seconds.      Coloration: Skin is not jaundiced or pale.      Findings: No bruising, erythema, lesion or rash.   Neurological:      Mental Status: He is alert and oriented to person, place, and time. Mental status is at baseline.      Cranial Nerves: No cranial nerve deficit.     "  Sensory: No sensory deficit.      Motor: Weakness present.      Coordination: Coordination normal.   Psychiatric:         Mood and Affect: Mood normal.         Laboratory:  Recent Labs     02/24/24  1728   WBC 9.2   RBC 5.81   HEMOGLOBIN 17.6   HEMATOCRIT 50.4   MCV 86.7   MCH 30.3   MCHC 34.9   RDW 51.5*   PLATELETCT 217   MPV 10.0         No results for input(s): \"ALTSGPT\", \"ASTSGOT\", \"ALKPHOSPHAT\", \"TBILIRUBIN\", \"DBILIRUBIN\", \"GAMMAGT\", \"AMYLASE\", \"LIPASE\", \"ALB\", \"PREALBUMIN\", \"GLUCOSE\" in the last 72 hours.      No results for input(s): \"NTPROBNP\" in the last 72 hours.      No results for input(s): \"TROPONINT\" in the last 72 hours.    Imaging:  [id number]    (Results Pending)       X-Ray:  I have personally reviewed the images and compared with prior images.  EKG:  I have personally reviewed the images and compared with prior images.    Assessment/Plan:  Justification for Admission Status  I anticipate this patient will require at least two midnights for appropriate medical management, necessitating inpatient admission because NSTEMI on heparin drip    Patient will need a Telemetry bed on MEDICAL service .  The need is secondary to NSTEMI on heparin drip.    * NSTEMI (non-ST elevated myocardial infarction) (HCC)- (present on admission)  Assessment & Plan  Patient with known history of CHF and CAD, status post multiple stents in the past, last send proximally 2 years ago.  Now presenting with chest pain  Initial troponin at outside facility was approximate 54, and on 2-hour repeat was 161.  EKG without any ST segment elevation or depression  Patient still with active chest pain, but improved breathing after nitroglycerin and pain medications  Currently on a heparin drip  Continue with aspirin and statin  Cardiology consulted, they will see the patient. Possible [facility] tomorrow  NPO MN      Tobacco abuse  Assessment & Plan  Patient smokes < 1PPD.  Nicotine patch and/or gum offered.   I discussed " cessation with patient including starting on nicotine patch and/or gum on discharge.  I also discussed medications to help with cessation with patient including Wellbutrin and Chantix, offered .  Smoking cessation discussed with patient for 4 minutes.     Pain, dental- (present on admission)  Assessment & Plan  Patient states that he pulled out his own teeth using pliers  Follow-up mandible panoramic    Elevated troponin- (present on admission)  Assessment & Plan  Elevated troponin outside facility.  Follow-up repeat troponin here    Coronary artery disease involving native coronary artery of native heart with refractory angina pectoris (HCC)- (present on admission)  Assessment & Plan  History of CAD status post 5-6 stents.  Last 2 years ago  Continue with aspirin and statin  Normally on apixaban, but currently on heparin drip  Cardiology consulted      Right sided weakness- (present on admission)  Assessment & Plan  Patient with history of prior strokes with residual right-sided weakness.  Normally takes apixaban along with aspirin        VTE prophylaxis: therapeutic anticoagulation with heparin drip

## 2024-02-25 NOTE — PROGRESS NOTES
1715 - Patient arrived to room 808 from [facility]. Oriented to room et floor. Placed on telemetry, verified with central monitoring. Allowed time for questions. Plan for admission discussed. Belongings documented in chart. Call light and belongings within reach, safety measures and fall precautions in place, white board updated.     1806- MD Juarez notified of critical troponin of 157. NNO. Cardiology on consult.     1809 - Notified telemetry monitoring that patient is going to radiology for Xray     1829 - Notified telemetry monitoring that patient is back in his room from radiology.     1913 - Reported off to Nancy PHILLIPS at patient's bedside.

## 2024-02-25 NOTE — CARE PLAN
The patient is Stable - Low risk of patient condition declining or worsening    Shift Goals  Clinical Goals: maintain heparin drip, therapeutic Xa, safety, pain control  Patient Goals: pain control, feel better  Family Goals: leigh ann    Progress made toward(s) clinical / shift goals:  Possible heart cath today        Patient is not progressing towards the following goals:

## 2024-02-25 NOTE — PROGRESS NOTES
Bedside report received from off going RN/tech: Nancy PHILLIPS, assumed care of patient.     Fall Risk Score: LOW RISK  Fall risk interventions in place: Place yellow fall risk ID band on patient and Provide patient/family education based on risk assessment  Bed type: Regular (Haseeb Score less than 17 interventions in place)  Patient on cardiac monitor: Yes  IVF/IV medications: Infusion per MAR (List Med(s)) Heparin  Oxygen: Room Air  Bedside sitter: Not Applicable   Isolation: Not applicable

## 2024-02-25 NOTE — ASSESSMENT & PLAN NOTE
Patient with history of prior strokes with residual right-sided weakness.  Normally takes apixaban along with aspirin

## 2024-02-25 NOTE — PROGRESS NOTES
.4 Eyes Skin Assessment Completed by Clarita PHILLIPS and Muriel ZEPEDA.    Head WDL  Ears WDL  Nose WDL  Mouth Redness cracked molar, top left  Neck WDL  Breast/Chest WDL  Shoulder Blades WDL  Spine WDL  (R) Arm/Elbow/Hand WDL  (L) Arm/Elbow/Hand WDL  Abdomen WDL  Groin WDL  Scrotum/Coccyx/Buttocks WDL  (R) Leg WDL  (L) Leg WDL  (R) Heel/Foot/Toe WDL  (L) Heel/Foot/Toe WDL          Devices In Places Tele Box, Blood Pressure Cuff, and Pulse Ox      Interventions In Place Pillows    Possible Skin Injury No    Pictures Uploaded Into Epic N/A  Wound Consult Placed N/A  RN Wound Prevention Protocol Ordered No

## 2024-02-25 NOTE — ASSESSMENT & PLAN NOTE
Uncontrolled  Increase losartan to 50 mg daily  Continue coreg and amlodipine  IV hydralazine PRN

## 2024-02-25 NOTE — ASSESSMENT & PLAN NOTE
Patient smokes < 1PPD.  Nicotine patch and/or gum offered.   I discussed cessation with patient including starting on nicotine patch and/or gum on discharge.  I also discussed medications to help with cessation with patient including Wellbutrin and Chantix, offered .  Smoking cessation discussed with patient for 4 minutes.

## 2024-02-25 NOTE — PROGRESS NOTES
Med rec completed per patient.  Allergies reviewed with patient.  - takes ibuprofen despite listed allergy  Home antibiotics in past 30 days: none   Anticoagulants/antiplatelets in past 14 days:   - Aspirin 81mg daily   - Eliquis 5mg BID,  Has not taken either in ~ 2 weeks.     Has not taken any prescriptions except NTG in last 2 weeks (unable to access medications).     Per pt, has been going through a bottle of tylenol and a bottle of ibuprofen every week.   PharmD aware.   Preferred [facility]: [facility], Pharmacy Intern

## 2024-02-25 NOTE — CARE PLAN
The patient is Stable - Low risk of patient condition declining or worsening    Shift Goals  Clinical Goals: maintain heparin drip, therapeutic Xa, safety, pain control  Patient Goals: pain control, feel better  Family Goals: leigh ann    Progress made toward(s) clinical / shift goals:  Possible heart cath today      Problem: Knowledge Deficit - Standard  Goal: Patient and family/care givers will demonstrate understanding of plan of care, disease process/condition, diagnostic tests and medications  2/25/2024 0712 by Clarita Black R.N.  Outcome: Progressing  2/25/2024 0711 by Clarita Black R.N.  Outcome: Progressing  2/24/2024 1843 by Clarita Black R.N.  Outcome: Progressing     Problem: Pain - Standard  Goal: Alleviation of pain or a reduction in pain to the patient’s comfort goal  2/25/2024 0712 by Clarita Black R.N.  Outcome: Progressing  2/25/2024 0711 by Clarita Black R.N.  Outcome: Progressing  2/24/2024 1843 by Clarita Black R.N.  Outcome: Progressing     Problem: Optimal Care for the Acute Coronary Syndrome Patient  Goal: Optimal Care for the Acute Coronary Syndrome Patient  2/25/2024 0712 by Clarita Black R.N.  Outcome: Progressing  2/25/2024 0711 by Clarita Black R.N.  Outcome: Progressing  2/24/2024 1843 by Clarita Black R.N.  Outcome: Progressing  Goal: Potential Interventions for the Acute Coronary Syndrome Patient  2/25/2024 0712 by Clarita Black R.N.  Outcome: Progressing  2/25/2024 0711 by Clarita Black R.N.  Outcome: Progressing  2/24/2024 1843 by Clarita Black R.N.  Outcome: Progressing     Problem: Optimal Care for the Cath Lab Patient  Goal: Optimal Care for the Cath Lab Patient  2/25/2024 0712 by Clarita Black R.N.  Outcome: Progressing  2/25/2024 0711 by Clarita Black R.N.  Outcome: Progressing  2/24/2024 1843 by Clarita Black R.N.  Outcome: Progressing     Problem: Respiratory  Goal: Patient will achieve/maintain optimum respiratory ventilation and gas  exchange  2/25/2024 0712 by Clarita Black R.N.  Outcome: Progressing  2/25/2024 0711 by Clarita Black R.N.  Outcome: Progressing  2/24/2024 1843 by Clarita Black R.N.  Outcome: Progressing     Problem: Hemodynamics  Goal: Patient's hemodynamics, fluid balance and neurologic status will be stable or improve  2/25/2024 0712 by Clarita Black R.N.  Outcome: Progressing  2/25/2024 0711 by Clarita Black R.N.  Outcome: Progressing  2/24/2024 1843 by Calrita Black R.N.  Outcome: Progressing       Patient is not progressing towards the following goals:

## 2024-02-26 ENCOUNTER — PHARMACY VISIT (OUTPATIENT)
Dept: PHARMACY | Facility: MEDICAL CENTER | Age: 54
End: 2024-02-26
Payer: COMMERCIAL

## 2024-02-26 ENCOUNTER — APPOINTMENT (OUTPATIENT)
Dept: RADIOLOGY | Facility: MEDICAL CENTER | Age: 54
DRG: 321 | End: 2024-02-26
Payer: MEDICAID

## 2024-02-26 VITALS
DIASTOLIC BLOOD PRESSURE: 109 MMHG | HEART RATE: 87 BPM | RESPIRATION RATE: 18 BRPM | HEIGHT: 68 IN | OXYGEN SATURATION: 95 % | SYSTOLIC BLOOD PRESSURE: 160 MMHG | TEMPERATURE: 97.9 F | WEIGHT: 230.38 LBS | BODY MASS INDEX: 34.92 KG/M2

## 2024-02-26 PROBLEM — R53.1 RIGHT SIDED WEAKNESS: Status: RESOLVED | Noted: 2023-11-22 | Resolved: 2024-02-26

## 2024-02-26 PROBLEM — K08.89 PAIN, DENTAL: Status: RESOLVED | Noted: 2023-12-17 | Resolved: 2024-02-26

## 2024-02-26 PROBLEM — R79.89 ELEVATED TROPONIN: Status: RESOLVED | Noted: 2023-12-17 | Resolved: 2024-02-26

## 2024-02-26 LAB
ANION GAP SERPL CALC-SCNC: 11 MMOL/L (ref 7–16)
BUN SERPL-MCNC: 18 MG/DL (ref 8–22)
CALCIUM SERPL-MCNC: 8.5 MG/DL (ref 8.5–10.5)
CHLORIDE SERPL-SCNC: 106 MMOL/L (ref 96–112)
CO2 SERPL-SCNC: 23 MMOL/L (ref 20–33)
CREAT SERPL-MCNC: 1.22 MG/DL (ref 0.5–1.4)
EKG IMPRESSION: NORMAL
EKG IMPRESSION: NORMAL
ERYTHROCYTE [DISTWIDTH] IN BLOOD BY AUTOMATED COUNT: 52.5 FL (ref 35.9–50)
GFR SERPLBLD CREATININE-BSD FMLA CKD-EPI: 70 ML/MIN/1.73 M 2
GLUCOSE SERPL-MCNC: 110 MG/DL (ref 65–99)
HCT VFR BLD AUTO: 48.3 % (ref 42–52)
HGB BLD-MCNC: 16.8 G/DL (ref 14–18)
MCH RBC QN AUTO: 30.7 PG (ref 27–33)
MCHC RBC AUTO-ENTMCNC: 34.8 G/DL (ref 32.3–36.5)
MCV RBC AUTO: 88.1 FL (ref 81.4–97.8)
PLATELET # BLD AUTO: 218 K/UL (ref 164–446)
PMV BLD AUTO: 9.6 FL (ref 9–12.9)
POTASSIUM SERPL-SCNC: 4.3 MMOL/L (ref 3.6–5.5)
RBC # BLD AUTO: 5.48 M/UL (ref 4.7–6.1)
SODIUM SERPL-SCNC: 140 MMOL/L (ref 135–145)
WBC # BLD AUTO: 9.9 K/UL (ref 4.8–10.8)

## 2024-02-26 PROCEDURE — A9270 NON-COVERED ITEM OR SERVICE: HCPCS | Performed by: STUDENT IN AN ORGANIZED HEALTH CARE EDUCATION/TRAINING PROGRAM

## 2024-02-26 PROCEDURE — 700102 HCHG RX REV CODE 250 W/ 637 OVERRIDE(OP)

## 2024-02-26 PROCEDURE — 700102 HCHG RX REV CODE 250 W/ 637 OVERRIDE(OP): Performed by: INTERNAL MEDICINE

## 2024-02-26 PROCEDURE — 97535 SELF CARE MNGMENT TRAINING: CPT

## 2024-02-26 PROCEDURE — 700111 HCHG RX REV CODE 636 W/ 250 OVERRIDE (IP)

## 2024-02-26 PROCEDURE — A9270 NON-COVERED ITEM OR SERVICE: HCPCS | Performed by: INTERNAL MEDICINE

## 2024-02-26 PROCEDURE — 85027 COMPLETE CBC AUTOMATED: CPT

## 2024-02-26 PROCEDURE — 93010 ELECTROCARDIOGRAM REPORT: CPT | Performed by: INTERNAL MEDICINE

## 2024-02-26 PROCEDURE — A9270 NON-COVERED ITEM OR SERVICE: HCPCS

## 2024-02-26 PROCEDURE — 99239 HOSP IP/OBS DSCHRG MGMT >30: CPT | Performed by: INTERNAL MEDICINE

## 2024-02-26 PROCEDURE — 71045 X-RAY EXAM CHEST 1 VIEW: CPT

## 2024-02-26 PROCEDURE — 80048 BASIC METABOLIC PNL TOTAL CA: CPT

## 2024-02-26 PROCEDURE — 36415 COLL VENOUS BLD VENIPUNCTURE: CPT

## 2024-02-26 PROCEDURE — RXMED WILLOW AMBULATORY MEDICATION CHARGE: Performed by: INTERNAL MEDICINE

## 2024-02-26 PROCEDURE — 700102 HCHG RX REV CODE 250 W/ 637 OVERRIDE(OP): Performed by: STUDENT IN AN ORGANIZED HEALTH CARE EDUCATION/TRAINING PROGRAM

## 2024-02-26 RX ORDER — CARVEDILOL 12.5 MG/1
12.5 TABLET ORAL 2 TIMES DAILY
Qty: 60 TABLET | Refills: 1 | Status: ON HOLD | OUTPATIENT
Start: 2024-02-26 | End: 2024-03-01

## 2024-02-26 RX ORDER — AMLODIPINE BESYLATE 10 MG/1
10 TABLET ORAL DAILY
Qty: 30 TABLET | Refills: 1 | Status: ON HOLD | OUTPATIENT
Start: 2024-02-26 | End: 2024-03-01

## 2024-02-26 RX ORDER — KETOROLAC TROMETHAMINE 15 MG/ML
15 INJECTION, SOLUTION INTRAMUSCULAR; INTRAVENOUS EVERY 6 HOURS PRN
Status: DISCONTINUED | OUTPATIENT
Start: 2024-02-26 | End: 2024-02-26 | Stop reason: HOSPADM

## 2024-02-26 RX ORDER — LOSARTAN POTASSIUM 100 MG/1
100 TABLET ORAL DAILY
Qty: 30 TABLET | Refills: 1 | Status: ON HOLD | OUTPATIENT
Start: 2024-02-27 | End: 2024-03-01

## 2024-02-26 RX ORDER — ISOSORBIDE MONONITRATE 30 MG/1
30 TABLET, EXTENDED RELEASE ORAL
Status: DISCONTINUED | OUTPATIENT
Start: 2024-02-26 | End: 2024-02-26 | Stop reason: HOSPADM

## 2024-02-26 RX ORDER — LOSARTAN POTASSIUM 50 MG/1
100 TABLET ORAL
Status: DISCONTINUED | OUTPATIENT
Start: 2024-02-27 | End: 2024-02-26 | Stop reason: HOSPADM

## 2024-02-26 RX ORDER — ASPIRIN 325 MG
325 TABLET ORAL ONCE
Status: COMPLETED | OUTPATIENT
Start: 2024-02-26 | End: 2024-02-26

## 2024-02-26 RX ORDER — OXYCODONE HYDROCHLORIDE AND ACETAMINOPHEN 5; 325 MG/1; MG/1
1 TABLET ORAL EVERY 6 HOURS PRN
Qty: 20 TABLET | Refills: 0 | Status: ON HOLD | OUTPATIENT
Start: 2024-02-26 | End: 2024-03-01

## 2024-02-26 RX ORDER — FUROSEMIDE 40 MG/1
40 TABLET ORAL DAILY
Qty: 30 TABLET | Refills: 1 | Status: SHIPPED | OUTPATIENT
Start: 2024-02-26

## 2024-02-26 RX ORDER — ATORVASTATIN CALCIUM 80 MG/1
80 TABLET, FILM COATED ORAL DAILY
Qty: 30 TABLET | Refills: 1 | Status: ON HOLD | OUTPATIENT
Start: 2024-02-26 | End: 2024-03-01

## 2024-02-26 RX ORDER — HYDROMORPHONE HYDROCHLORIDE 1 MG/ML
1 INJECTION, SOLUTION INTRAMUSCULAR; INTRAVENOUS; SUBCUTANEOUS
Status: DISCONTINUED | OUTPATIENT
Start: 2024-02-26 | End: 2024-02-26 | Stop reason: HOSPADM

## 2024-02-26 RX ORDER — CLOPIDOGREL BISULFATE 75 MG/1
75 TABLET ORAL DAILY
Qty: 90 TABLET | Refills: 1 | Status: ON HOLD | OUTPATIENT
Start: 2024-02-27 | End: 2024-03-01

## 2024-02-26 RX ORDER — ISOSORBIDE MONONITRATE 30 MG/1
30 TABLET, EXTENDED RELEASE ORAL DAILY
Qty: 30 TABLET | Refills: 0 | Status: ON HOLD | OUTPATIENT
Start: 2024-02-26 | End: 2024-03-01

## 2024-02-26 RX ADMIN — ATORVASTATIN CALCIUM 80 MG: 80 TABLET, FILM COATED ORAL at 05:10

## 2024-02-26 RX ADMIN — OMEPRAZOLE 40 MG: 20 CAPSULE, DELAYED RELEASE ORAL at 05:10

## 2024-02-26 RX ADMIN — CLOPIDOGREL BISULFATE 75 MG: 75 TABLET ORAL at 05:11

## 2024-02-26 RX ADMIN — APIXABAN 5 MG: 5 TABLET, FILM COATED ORAL at 05:10

## 2024-02-26 RX ADMIN — LOSARTAN POTASSIUM 50 MG: 50 TABLET, FILM COATED ORAL at 05:11

## 2024-02-26 RX ADMIN — OXYCODONE 5 MG: 5 TABLET ORAL at 05:11

## 2024-02-26 RX ADMIN — FUROSEMIDE 40 MG: 40 TABLET ORAL at 05:10

## 2024-02-26 RX ADMIN — ACETAMINOPHEN 650 MG: 325 TABLET, FILM COATED ORAL at 02:01

## 2024-02-26 RX ADMIN — CARVEDILOL 12.5 MG: 12.5 TABLET, FILM COATED ORAL at 07:34

## 2024-02-26 RX ADMIN — HYDROMORPHONE HYDROCHLORIDE 1 MG: 1 INJECTION, SOLUTION INTRAMUSCULAR; INTRAVENOUS; SUBCUTANEOUS at 02:06

## 2024-02-26 RX ADMIN — LIDOCAINE HYDROCHLORIDE 30 ML: 20 SOLUTION ORAL; TOPICAL at 02:12

## 2024-02-26 RX ADMIN — NITROGLYCERIN 0.4 MG: 0.4 TABLET, ORALLY DISINTEGRATING SUBLINGUAL at 01:30

## 2024-02-26 RX ADMIN — KETOROLAC TROMETHAMINE 15 MG: 15 INJECTION, SOLUTION INTRAMUSCULAR; INTRAVENOUS at 03:26

## 2024-02-26 RX ADMIN — ASPIRIN 325 MG: 325 TABLET ORAL at 02:01

## 2024-02-26 RX ADMIN — AMLODIPINE BESYLATE 10 MG: 10 TABLET ORAL at 05:10

## 2024-02-26 RX ADMIN — NITROGLYCERIN 0.4 MG: 0.4 TABLET, ORALLY DISINTEGRATING SUBLINGUAL at 01:21

## 2024-02-26 ASSESSMENT — FIBROSIS 4 INDEX: FIB4 SCORE: 1.86

## 2024-02-26 NOTE — THERAPY
Physical Therapy Contact Note    Patient Name: Divine Lugo  Age:  53 y.o., Sex:  male  Medical Record #: [id number]  Today's Date: 2/26/2024    Patient received sitting on couch and agreeable to PT session. Cardiac rehab handout was given and discussed including home walking program, activity pacing, rest breaks, RPE scale, talk test, and modifiable/non modifiable risk factors. All of patients questions answered. Pt politely declined further mobility and reports he has been up and ambulating numerous times with no mobility concerns. Anticipate pt can return home with no further PT needs. No further acute care PT needs.        02/26/24 1945   Education Group   Education Provided Role of Physical Therapist   Role of Physical Therapist Patient Response Patient;Acceptance;Explanation;Verbal Demonstration   Additional Comments given and discussed cardiac rehab handout   Anticipated Discharge Equipment and Recommendations   DC Equipment Recommendations None   Discharge Recommendations Anticipate that the patient will have no further physical therapy needs after discharge from the hospital   Interdisciplinary Plan of Care Collaboration   IDT Collaboration with  Nursing   Session Information   Date / Session Number  2/26 - eval only

## 2024-02-26 NOTE — PROGRESS NOTES
Bedside report received from off going RN Clarita:, assumed care of patient.     Fall Risk Score: MODERATE RISK  Fall risk interventions in place: Place yellow fall risk ID band on patient, Provide patient/family education based on risk assessment, Educate patient/family to call staff for assistance when getting out of bed, Place fall precaution signage outside patient door, and Refuses - escalate to charge  Bed type: Regular (Haseeb Score less than 17 interventions in place)  Patient on cardiac monitor: Yes  IVF/IV medications: Not Applicable   Oxygen: Room Air  Bedside sitter: Not Applicable   Isolation: Not applicable    Patient refusing bed alarm, educated on increased risk of falls and importance of safety while in the hospital and that a fall may result in serious injury. Pt verbalized understanding of the importance of a bed alarm and refuses x2, Charge RN notified.

## 2024-02-26 NOTE — PROGRESS NOTES
Bedside report received from off going RN/tech: Jerman, assumed care of patient.     Fall Risk Score: LOW RISK  Fall risk interventions in place: Place yellow fall risk ID band on patient, Provide patient/family education based on risk assessment, Educate patient/family to call staff for assistance when getting out of bed, Place fall precaution signage outside patient door, Place patient in room close to nursing station, Utilize bed/chair fall alarm, and Bed alarm connected correctly  Bed type: Regular (Haseeb Score less than 17 interventions in place)  Patient on cardiac monitor: Yes  IVF/IV medications: Not Applicable   Oxygen: Room Air  Bedside sitter: Not Applicable   Isolation: Not applicable

## 2024-02-26 NOTE — CARE PLAN
The patient is Watcher - Medium risk of patient condition declining or worsening    Shift Goals  Clinical Goals: Safety, Monitor Chest pain, VS, control BP  Patient Goals: painb control, sleep  Family Goals: na    Progress made toward(s) clinical / shift goals:    Problem: Hemodynamics  Goal: Patient's hemodynamics, fluid balance and neurologic status will be stable or improve  Outcome: Progressing     Problem: Fall Risk  Goal: Patient will remain free from falls  Outcome: Progressing       Patient is not progressing towards the following goals:

## 2024-02-26 NOTE — DISCHARGE PLANNING
DC Transport Scheduled    Transport Company Scheduled:  [employer]  Spoke with Anastasiya at [facility] to schedule transport.  [facility] Trip #: [id number]    Scheduled Date: 2/26/2024  Scheduled Time: [id number]    Destination: Home   Destination Name: [facility]  Destination address:  [place] [facility] [address] [address]      Notified care team of scheduled transport via Voalte.     If there are any changes needed to the DC transportation scheduled, please contact [facility] at ext. [phone] between the hours of [phone] Mon-Fri. If outside those hours, contact the ED Case Manager at ext. [phone].

## 2024-02-26 NOTE — DISCHARGE INSTRUCTIONS
Radial Catherization Discharge Instructions      Do not subject hand/arm to any forceful movements for 24 hours    i.e. supporting weight when rising from the chair or bed.   Do not drive a car for 24 hours  You may remove the dressing tomorrow  You may shower on the day following your procedure.  Do not take a tub bath or submerge the puncture site in water for 3 days following the procedure.  No Lifting more than 3-5 pounds with affected wrist for 5 days  Follow up with cardio and vascular   2-4 weeks.  Increase fluids for 2 days post procedure.  Continue all previous medications unless otherwise instructed.    If bleeding should occur following discharge:  Sit down and apply firm pressure to site with your fingers for 10 minutes  If the bleeding stops, continue to sit quietly, keeping your wrist straight for 2 hours.  Notify physician as soon as possible ( [phone])  If bleeding does not stop after 10 minutes, or if there is a large amount of bleeding or spurting, call 911 immediately.  Do not drive yourself to the hospital.    Diagnosis:  Acute Coronary Syndrome (ACS) is a diagnosis that encompasses cardiac-related chest pain and heart attack. ACS occurs when the blood flow to the heart muscle is severely reduced or cut off completely due to a slow process called atherosclerosis.  Atherosclerosis is a disease in which the coronary arteries become narrow from a buildup of fat, cholesterol, and other substances that combine to form plaque. If the plaque breaks, a blood clot will form and block the blood flow to the heart muscle. This lack of blood flow can cause damage or death to the heart muscle which is called a heart attack or Myocardial Infarction (MI). There are two different types of MIs:  ST Elevation Myocardial Infarction or STEMI (the most severe type of heart attack) and Non-ST Elevation Myocardial Infarction or NSTEMI.    Treatment Plan:  Cardiac Diet  - Low fat, low salt, low cholesterol   Cardiac  Rehab  - Your doctor has ordered you a referral to [facility].  Call [phone] to schedule an appointment.  Attend my follow-up appointment with my Cardiologist.  Take my medications as prescribed by my doctor  Exercise daily  Lower my bad cholesterol and raise my good cholesterol, lower my blood pressure, and Reduce stress    Medications:  Certain medications are used to treat ACS.  Remember to always take medications as prescribed and never stop talking medications unless told by your doctor.    You have been prescribed the following medicatons:    Anti-platelet/blood thinner - Your Anti-platelet/Blood thinning medication is called Clopidogrel, and is used in combination with aspirin to prevent clots from forming in your heart and/or around your stent.  Your doctor will determine how long you need to be on this medicine.  Beta-Blocker - Beta-Blocker Carvedilol is used to lower blood pressure and heart rate, and/or helps your heart heal after a heart attack.  Statin - Statin Atorvastatin is used to lower cholesterol.  Nitroglycerine - Nitroglycerine is used to relieve chest pain.

## 2024-02-26 NOTE — DISCHARGE PLANNING
Case Management Discharge Planning    Admission Date: 2/24/2024  GMLOS: 2  ALOS: 2    6-Clicks ADL Score: 24  6-Clicks Mobility Score: 24      Anticipated Discharge Dispo: Discharge Disposition: Discharged to home/self care (01)    DME Needed: No    Action(s) Taken: Patient discussed in rounds, medically cleared per provider. [employer] will need transportation home to [place].     RN CM requested transport for 12pm via [facility] benefits to patient's home -[facility]; [address], [place] [address]. Confirmed address with patient, his girlfriend will be at location waiting for him.    Awaiting confirmation from Paige Castillo Completed: None    Medically Clear: Yes    Next Steps: F/U with IDT team regarding D/C needs/ barriers.     Barriers to Discharge: Transportation    Is the patient up for discharge tomorrow: No

## 2024-02-26 NOTE — DIETARY
Nutrition Services: Cardiac Education Consult   Day 2 of admit.  Divine Lugo is a 53 y.o. male with admitting DX of NSTEMI (non-ST elevated myocardial infarction) ([facility]) [I21.4]    RD able to visit pt at bedside to provide modified cardiac diet education - patient reports he is homeless and does not have access to a kitchen.  He states that he lives in a [facility] trailer with a mattress and utilizes the food pantry and food stamps. RD discussed importance of incorporating fruits and vegetables with meals and limiting processed foods when possible, provided handout reinforcing topics discussed.  Emphasized the MyPlate method and discussed products that contain saturated fats.  Pt demonstrated readiness and evidence of learning. RD able to answer all questions to patient's satisfaction.     No other education needs identified at this time. Consider referral to outpatient nutrition services for continuation of education as indicated or per pt preferences.     Please re-consult RD as indicated.

## 2024-02-26 NOTE — PROGRESS NOTES
"  NOC APRN CROSS COVER NOTE      Patient has been having waxing and waning chest pain all evening. Patient is post stent placement to LAD.     EKGs are improved from prior to stent placement. Chest x-ray without acute cardiopulmonary abnormality.     Patient has received two doses of PO ativan, three doses of sublingual nitro and one dose of 2 mg IV morphine. All given with minimal to no relief.    Patient seen and examined at bedside. Pain is left sided with no radiation, in a \"crescent moon\" pattern directly over his heart. Pain is not reproducible to palpation but on occasion seems worse with deep inspiration.     Orders for 325 mg PO aspirin once, GI cocktail once and 1 mg IV dilaudid q 3 hours PRN for severe pain.      Elisa Marcus ACN-AG, NOC Hospitalist APRN        "

## 2024-02-26 NOTE — PROGRESS NOTES
Patient being discharged. Pt educated on discharge instructions and new prescriptions, verbalized understanding. Follow up appointment made with cardio and Vascular . PIV removed, monitor checked in. Patient going home  via  [employer]

## 2024-02-26 NOTE — PROGRESS NOTES
Monitor Summary  Rhythm: SR  Rate: 74-87  Ectopy: rPVC  Measurement: .13/.09/.42        12 hr chart check

## 2024-02-26 NOTE — DISCHARGE SUMMARY
Discharge Summary    CHIEF COMPLAINT ON ADMISSION  No chief complaint on file.      Reason for Admission  NSTEMI     Admission Date  2/24/2024    CODE STATUS  Full Code    HPI & HOSPITAL COURSE  53 y.o. male with past medical history of CAD status post multiple stents, prior CVA on apixaban, congestive heart failure who presented 2/24/2024 with chest pain.  Patient was a transfer here from [place] for concerns with NSTEMI.  Patient initially presented with concerns of chest pain that started at approximately 5 AM.  States that it is substernal in nature, more localized on the left side of his chest.  He states that it is nonradiating.  He has had multiple MIs in the past, and states that this feels the same.  He has had minimal improvement with nitroglycerin and pain medications.  At the outside facility EKG was unremarkable, without any ST segment elevation or depression.  Initial troponin was in the 50s, but on repeat increased to the 160s.  He was subsequently started on a heparin drip and transferred to [facility].  Patient still complains of chest pain, but states that it is slightly improved.  Cardiology has been consulted and will come evaluate the patient.  Patient underwent cardiac catheterization that revealed her stent in mid LAD, proximal portion proximal of the stent with severe stenosis of 80%, this involves first diagonal branch which has 80% stenosis.  Patient underwent PCI and placement of MAYRA to LAD.  He was monitored postprocedure.  He continues to report some chest pain and was given sublingual nitro and morphine.  His symptoms improved.  His blood pressure was elevated therefore his losartan was increased to 100 mg daily.  Patient is cleared for discharge from a cardiology standpoint.  Case discussed with Cardiology, per Dr Shaji amin to resume Eliquis and DC aspirin. Patient will be continued on Plavix and Eliquis  . Will be discharged home as instructed to take his  medications as prescribed.    Therefore, he is discharged in good and stable condition to home with close outpatient follow-up.    The patient met 2-midnight criteria for an inpatient stay at the time of discharge.    Discharge Date  2/26/24    FOLLOW UP ITEMS POST DISCHARGE  PCP and Cardiology     DISCHARGE DIAGNOSES  Principal Problem:    NSTEMI (non-ST elevated myocardial infarction) (HCC) (POA: Yes)  Active Problems:    Primary hypertension (POA: Yes)      Overview: Patient with noted continued elevated blood pressure in clinic of 196/92.       Discussed with patient who stated it is because I am in pain.     Coronary artery disease involving native coronary artery of native heart with refractory angina pectoris (HCC) (POA: Yes)    Tobacco abuse (POA: Yes)  Resolved Problems:    Right sided weakness (POA: Yes)    Elevated troponin (POA: Yes)    Pain, dental (POA: Yes)      FOLLOW UP  Future Appointments   Date Time Provider Department Center   3/25/2024  1:15 PM TONYA Curry None     No follow-up provider specified.    MEDICATIONS ON DISCHARGE     Medication List        START taking these medications        Instructions   clopidogrel 75 MG Tabs  Start taking on: February 27, 2024  Commonly known as: Plavix   Take 1 Tablet by mouth every day.  Dose: 75 mg     losartan 100 MG Tabs  Start taking on: February 27, 2024  Commonly known as: Cozaar   Take 1 Tablet by mouth every day.  Dose: 100 mg            CHANGE how you take these medications        Instructions   acetaminophen 500 MG Tabs  What changed: additional instructions  Commonly known as: Tylenol   Take 2 Tablets by mouth 3 times a day as needed for Mild Pain.  Dose: 1,000 mg     carvedilol 12.5 MG Tabs  What changed:   medication strength  how much to take  Commonly known as: Coreg   Take 1 Tablet by mouth 2 times a day.  Dose: 12.5 mg     cyclobenzaprine 10 mg Tabs  What changed: when to take this  Commonly known as: Flexeril   Take 1  Tablet by mouth 2 times a day as needed for Muscle Spasms.  Dose: 10 mg            CONTINUE taking these medications        Instructions   amLODIPine 10 MG Tabs  Commonly known as: Norvasc   Take 1 Tablet by mouth every day.  Dose: 10 mg     apixaban 5mg Tabs  Commonly known as: Eliquis   Take 1 Tablet by mouth 2 times a day.  Dose: 5 mg     atorvastatin 80 MG tablet  Commonly known as: Lipitor   Take 1 Tablet by mouth every day.  Dose: 80 mg     Benadryl Allergy 25 MG capsule  Generic drug: diphenhydrAMINE   Take 25 mg by mouth at bedtime as needed (congestion).  Dose: 25 mg     EPINEPHrine 0.3 MG/0.3ML Soaj solution for injection  Commonly known as: Epipen   INJECT CONTENTS OF 1 PEN SUBCUTANEOUSLY AS NEEDED FOR ALLERGIC REACTION MAY REPEAT DOSE AFTER 5 TO 15 MINTUTES     furosemide 40 MG Tabs  Commonly known as: Lasix   Take 1 Tablet by mouth every day.  Dose: 40 mg     nitroglycerin 0.4 MG Subl  Commonly known as: Nitrostat   Place 1 Tablet under the tongue as needed for Chest Pain for up to 30 days. DISSOLVE ONE TABLET UNDER THE TONGUE EVERY 5 MINUTES AS NEEDED FOR CHEST PAIN DO NOT EXCEED A TOTAL OF 3 DOSES IN 15 MINUTES  (Place 1 Tablet under the tongue as needed for Chest Pain for up to 30 days. DISSOLVE ONE TABLET UNDER THE TONGUE EVERY 5 MINUTES AS NEEDED FOR CHEST PAIN DO NOT EXCEED A TOTAL OF 3 DOSES IN 15 MINUTES)  Dose: 0.4 mg     NON SPECIFIED   Take 1 Tablet by mouth 2 times a day. KRATOM  Dose: 1 Tablet     omeprazole 40 MG delayed-release capsule  Commonly known as: PriLOSEC   Take 40 mg by mouth every day.  Dose: 40 mg     ondansetron 4 MG Tbdp  Commonly known as: Zofran ODT   Take 1 Tablet by mouth every 8 hours as needed for Nausea/Vomiting.  Dose: 4 mg     oxyCODONE-acetaminophen 5-325 MG Tabs  Commonly known as: Percocet   Take 1 Tablet by mouth every four hours as needed for Severe Pain.  Dose: 1 Tablet     potassium chloride SA 20 MEQ Tbcr  Commonly known as: Kdur   Take 20 mEq by mouth every  day.  Dose: 20 mEq     trazodone 300 MG tablet  Commonly known as: Desyrel   Take 300 mg by mouth every evening. Take with 200 mg (2 tabs of 100 mg) for total dose of 500 mg  Dose: 300 mg            STOP taking these medications      aspirin 81 MG Chew chewable tablet  Commonly known as: Asa     ibuprofen 200 MG Tabs  Commonly known as: Motrin              Allergies  Allergies   Allergen Reactions    Gabapentin Unspecified     Causes patient to seizures    Depakote [Valproic Acid] Unspecified     Non epileptic seizure ( stated by patient )    Ibuprofen      Hx kidney disease, still takes at home     Lisinopril      Note: Built up in system and caused overdose. Body not able to process medication.    Topiramate      Note: Unknown.       DIET  Orders Placed This Encounter   Procedures    Diet Order Diet: Cardiac     Standing Status:   Standing     Number of Occurrences:   1     Order Specific Question:   Diet:     Answer:   Cardiac [6]       ACTIVITY  As tolerated.  Weight bearing as tolerated    CONSULTATIONS  Cardiology Dr Girard    PROCEDURES  PCI    LABORATORY  Lab Results   Component Value Date    SODIUM 140 02/26/2024    POTASSIUM 4.3 02/26/2024    CHLORIDE 106 02/26/2024    CO2 23 02/26/2024    GLUCOSE 110 (H) 02/26/2024    BUN 18 02/26/2024    CREATININE 1.22 02/26/2024    GLOMRATE 49 (L) 11/03/2023        Lab Results   Component Value Date    WBC 9.9 02/26/2024    HEMOGLOBIN 16.8 02/26/2024    HEMATOCRIT 48.3 02/26/2024    PLATELETCT 218 02/26/2024        Total time of the discharge process exceeds 35 minutes.

## 2024-02-26 NOTE — PROGRESS NOTES
Report received from outgoing nurse, care transferred at 1900. Patient currently in SR 87 on the monitor and A&Ox 4. Patient complaining of pressure and tightness in the left chest that worsens with palpation and deep breaths.     2015 - Patient states it feels like a lot of anxiety. STAT EKG ordered and reviewed at bedside by RN and Charge RN. Whiteboard updated with plan for the day and names of staff. No other questions or concerns at this time from the patient. Call light within reach, fall precautions in place.     Pt educated on risks of caffeine and the amount included in soda, including that caffeine may worsen anxiety and/or chest pain. Patient verbalizes understanding but declines other drinks at this time.     2130 - Patient states chest pain is gone but chronic pain in neck and shoulder is still present, medication and heat provided.    2330 - STAT EKG performed at bedside for episodic increasing chest pain, most recent EKG done while chest pain 10/10. Nitro tab given 2323 while pain was a 7/10. KEVIN Marcus updated and shown EKG. EKG not showing any ST segment changes.    2356 - Morphine ordered and given, chest pain now a 7/10. Ativan given in coordination with provider. Patient agreeable to reducing caffeine intake. Pt states that their chest pain comes and goes the more they think about having another MI and that they are fearful of dying. Therapeutic communication used in attempting to sympathize with patient and reduce anxiety.    0030 - chest pain now down to a 5/10, intermittently a 7/10.    0141 - Patient still complaining of 9-10/10 chest pain that is episodic and reoccurring. Same pain as described prior: tightness/pressure in mid left chest non-radiating, reproducible w/ deep breaths and palpation. Two doses of nitro attempted, still 10/10, Provider, Charge RN, and CIC rapid RN updated. CXR, ASA, and GI cocktail ordered.     0315 - Patient complaining of severe 10/10 chest pain once more  stating he did get some relief for a bit. Reached out to Dr Girard with cardiology for opinions, recommendations passed along to APRN, Toradol ordered.     2020 - Patient states their chest pain is down to a 5/10 after Toradol given, now able to sleep.

## 2024-02-28 ENCOUNTER — HOSPITAL ENCOUNTER (INPATIENT)
Facility: MEDICAL CENTER | Age: 54
LOS: 2 days | DRG: 321 | End: 2024-03-01
Attending: STUDENT IN AN ORGANIZED HEALTH CARE EDUCATION/TRAINING PROGRAM | Admitting: HOSPITALIST
Payer: MEDICAID

## 2024-02-28 ENCOUNTER — APPOINTMENT (OUTPATIENT)
Dept: RADIOLOGY | Facility: MEDICAL CENTER | Age: 54
DRG: 321 | End: 2024-02-28
Attending: STUDENT IN AN ORGANIZED HEALTH CARE EDUCATION/TRAINING PROGRAM
Payer: MEDICAID

## 2024-02-28 DIAGNOSIS — I21.4 NON-ST ELEVATION MYOCARDIAL INFARCTION (NSTEMI) (HCC): Chronic | ICD-10-CM

## 2024-02-28 DIAGNOSIS — R07.9 CHEST PAIN, UNSPECIFIED TYPE: ICD-10-CM

## 2024-02-28 DIAGNOSIS — I10 PRIMARY HYPERTENSION: ICD-10-CM

## 2024-02-28 DIAGNOSIS — I63.9 ACUTE CVA (CEREBROVASCULAR ACCIDENT) (HCC): ICD-10-CM

## 2024-02-28 DIAGNOSIS — I21.4 NSTEMI (NON-ST ELEVATED MYOCARDIAL INFARCTION) (HCC): ICD-10-CM

## 2024-02-28 DIAGNOSIS — I25.112 CORONARY ARTERY DISEASE INVOLVING NATIVE CORONARY ARTERY OF NATIVE HEART WITH REFRACTORY ANGINA PECTORIS (HCC): ICD-10-CM

## 2024-02-28 LAB
ALBUMIN SERPL BCP-MCNC: 3.6 G/DL (ref 3.2–4.9)
ALBUMIN/GLOB SERPL: 1.2 G/DL
ALP SERPL-CCNC: 103 U/L (ref 30–99)
ALT SERPL-CCNC: 96 U/L (ref 2–50)
AMPHET UR QL SCN: NEGATIVE
ANION GAP SERPL CALC-SCNC: 13 MMOL/L (ref 7–16)
APTT PPP: 48.9 SEC (ref 24.7–36)
AST SERPL-CCNC: 63 U/L (ref 12–45)
BARBITURATES UR QL SCN: NEGATIVE
BASOPHILS # BLD AUTO: 0.4 % (ref 0–1.8)
BASOPHILS # BLD: 0.04 K/UL (ref 0–0.12)
BENZODIAZ UR QL SCN: NEGATIVE
BILIRUB SERPL-MCNC: 0.5 MG/DL (ref 0.1–1.5)
BUN SERPL-MCNC: 20 MG/DL (ref 8–22)
BZE UR QL SCN: NEGATIVE
CALCIUM ALBUM COR SERPL-MCNC: 8.6 MG/DL (ref 8.5–10.5)
CALCIUM SERPL-MCNC: 8.3 MG/DL (ref 8.5–10.5)
CANNABINOIDS UR QL SCN: POSITIVE
CHLORIDE SERPL-SCNC: 107 MMOL/L (ref 96–112)
CO2 SERPL-SCNC: 22 MMOL/L (ref 20–33)
CREAT SERPL-MCNC: 1.34 MG/DL (ref 0.5–1.4)
EKG IMPRESSION: NORMAL
EOSINOPHIL # BLD AUTO: 0.16 K/UL (ref 0–0.51)
EOSINOPHIL NFR BLD: 1.6 % (ref 0–6.9)
ERYTHROCYTE [DISTWIDTH] IN BLOOD BY AUTOMATED COUNT: 52.3 FL (ref 35.9–50)
FENTANYL UR QL: POSITIVE
GFR SERPLBLD CREATININE-BSD FMLA CKD-EPI: 63 ML/MIN/1.73 M 2
GLOBULIN SER CALC-MCNC: 2.9 G/DL (ref 1.9–3.5)
GLUCOSE SERPL-MCNC: 101 MG/DL (ref 65–99)
HCT VFR BLD AUTO: 45.7 % (ref 42–52)
HGB BLD-MCNC: 16.1 G/DL (ref 14–18)
IMM GRANULOCYTES # BLD AUTO: 0.15 K/UL (ref 0–0.11)
IMM GRANULOCYTES NFR BLD AUTO: 1.5 % (ref 0–0.9)
INR PPP: 1.01 (ref 0.87–1.13)
LYMPHOCYTES # BLD AUTO: 2.69 K/UL (ref 1–4.8)
LYMPHOCYTES NFR BLD: 27.1 % (ref 22–41)
MCH RBC QN AUTO: 30.7 PG (ref 27–33)
MCHC RBC AUTO-ENTMCNC: 35.2 G/DL (ref 32.3–36.5)
MCV RBC AUTO: 87 FL (ref 81.4–97.8)
METHADONE UR QL SCN: NEGATIVE
MONOCYTES # BLD AUTO: 0.88 K/UL (ref 0–0.85)
MONOCYTES NFR BLD AUTO: 8.9 % (ref 0–13.4)
NEUTROPHILS # BLD AUTO: 5.99 K/UL (ref 1.82–7.42)
NEUTROPHILS NFR BLD: 60.5 % (ref 44–72)
NRBC # BLD AUTO: 0 K/UL
NRBC BLD-RTO: 0 /100 WBC (ref 0–0.2)
OPIATES UR QL SCN: POSITIVE
OXYCODONE UR QL SCN: POSITIVE
PCP UR QL SCN: NEGATIVE
PLATELET # BLD AUTO: 230 K/UL (ref 164–446)
PMV BLD AUTO: 10.1 FL (ref 9–12.9)
POTASSIUM SERPL-SCNC: 4.4 MMOL/L (ref 3.6–5.5)
PROPOXYPH UR QL SCN: NEGATIVE
PROT SERPL-MCNC: 6.5 G/DL (ref 6–8.2)
PROTHROMBIN TIME: 13.4 SEC (ref 12–14.6)
RBC # BLD AUTO: 5.25 M/UL (ref 4.7–6.1)
SODIUM SERPL-SCNC: 142 MMOL/L (ref 135–145)
T PALLIDUM AB SER QL IA: NORMAL
TROPONIN T SERPL-MCNC: 451 NG/L (ref 6–19)
UFH PPP CHRO-ACNC: 1.08 IU/ML
UFH PPP CHRO-ACNC: >1.1 IU/ML
WBC # BLD AUTO: 9.9 K/UL (ref 4.8–10.8)

## 2024-02-28 PROCEDURE — 700102 HCHG RX REV CODE 250 W/ 637 OVERRIDE(OP): Performed by: HOSPITALIST

## 2024-02-28 PROCEDURE — 96365 THER/PROPH/DIAG IV INF INIT: CPT

## 2024-02-28 PROCEDURE — 36415 COLL VENOUS BLD VENIPUNCTURE: CPT

## 2024-02-28 PROCEDURE — 93005 ELECTROCARDIOGRAM TRACING: CPT | Performed by: STUDENT IN AN ORGANIZED HEALTH CARE EDUCATION/TRAINING PROGRAM

## 2024-02-28 PROCEDURE — 84484 ASSAY OF TROPONIN QUANT: CPT

## 2024-02-28 PROCEDURE — 85025 COMPLETE CBC W/AUTO DIFF WBC: CPT

## 2024-02-28 PROCEDURE — 96375 TX/PRO/DX INJ NEW DRUG ADDON: CPT

## 2024-02-28 PROCEDURE — 700102 HCHG RX REV CODE 250 W/ 637 OVERRIDE(OP): Mod: UD | Performed by: STUDENT IN AN ORGANIZED HEALTH CARE EDUCATION/TRAINING PROGRAM

## 2024-02-28 PROCEDURE — 99406 BEHAV CHNG SMOKING 3-10 MIN: CPT | Performed by: HOSPITALIST

## 2024-02-28 PROCEDURE — A9270 NON-COVERED ITEM OR SERVICE: HCPCS | Performed by: HOSPITALIST

## 2024-02-28 PROCEDURE — 99223 1ST HOSP IP/OBS HIGH 75: CPT | Mod: 25 | Performed by: HOSPITALIST

## 2024-02-28 PROCEDURE — A9270 NON-COVERED ITEM OR SERVICE: HCPCS | Mod: UD | Performed by: STUDENT IN AN ORGANIZED HEALTH CARE EDUCATION/TRAINING PROGRAM

## 2024-02-28 PROCEDURE — 85520 HEPARIN ASSAY: CPT | Mod: 91

## 2024-02-28 PROCEDURE — 99285 EMERGENCY DEPT VISIT HI MDM: CPT

## 2024-02-28 PROCEDURE — 93005 ELECTROCARDIOGRAM TRACING: CPT

## 2024-02-28 PROCEDURE — 96366 THER/PROPH/DIAG IV INF ADDON: CPT

## 2024-02-28 PROCEDURE — 80053 COMPREHEN METABOLIC PANEL: CPT

## 2024-02-28 PROCEDURE — 85730 THROMBOPLASTIN TIME PARTIAL: CPT

## 2024-02-28 PROCEDURE — 86780 TREPONEMA PALLIDUM: CPT

## 2024-02-28 PROCEDURE — 96376 TX/PRO/DX INJ SAME DRUG ADON: CPT

## 2024-02-28 PROCEDURE — 700111 HCHG RX REV CODE 636 W/ 250 OVERRIDE (IP): Mod: UD | Performed by: STUDENT IN AN ORGANIZED HEALTH CARE EDUCATION/TRAINING PROGRAM

## 2024-02-28 PROCEDURE — 99406 BEHAV CHNG SMOKING 3-10 MIN: CPT

## 2024-02-28 PROCEDURE — 770020 HCHG ROOM/CARE - TELE (206)

## 2024-02-28 PROCEDURE — 85610 PROTHROMBIN TIME: CPT

## 2024-02-28 PROCEDURE — 80307 DRUG TEST PRSMV CHEM ANLYZR: CPT

## 2024-02-28 PROCEDURE — 71045 X-RAY EXAM CHEST 1 VIEW: CPT

## 2024-02-28 RX ORDER — OMEPRAZOLE 20 MG/1
40 CAPSULE, DELAYED RELEASE ORAL DAILY
Status: DISCONTINUED | OUTPATIENT
Start: 2024-02-29 | End: 2024-03-01 | Stop reason: HOSPADM

## 2024-02-28 RX ORDER — AMLODIPINE BESYLATE 10 MG/1
10 TABLET ORAL DAILY
Status: DISCONTINUED | OUTPATIENT
Start: 2024-02-29 | End: 2024-03-01 | Stop reason: HOSPADM

## 2024-02-28 RX ORDER — LOSARTAN POTASSIUM 50 MG/1
100 TABLET ORAL DAILY
Status: DISCONTINUED | OUTPATIENT
Start: 2024-02-29 | End: 2024-03-01 | Stop reason: HOSPADM

## 2024-02-28 RX ORDER — TRAZODONE HYDROCHLORIDE 100 MG/1
500 TABLET ORAL NIGHTLY
Status: DISCONTINUED | OUTPATIENT
Start: 2024-02-29 | End: 2024-03-01 | Stop reason: HOSPADM

## 2024-02-28 RX ORDER — CLOPIDOGREL BISULFATE 75 MG/1
75 TABLET ORAL DAILY
Status: DISCONTINUED | OUTPATIENT
Start: 2024-02-29 | End: 2024-03-01 | Stop reason: HOSPADM

## 2024-02-28 RX ORDER — DIAZEPAM 5 MG/1
5 TABLET ORAL EVERY 6 HOURS PRN
Status: DISCONTINUED | OUTPATIENT
Start: 2024-02-28 | End: 2024-03-01 | Stop reason: HOSPADM

## 2024-02-28 RX ORDER — HEPARIN SODIUM 5000 [USP'U]/100ML
0-30 INJECTION, SOLUTION INTRAVENOUS CONTINUOUS
Status: DISCONTINUED | OUTPATIENT
Start: 2024-02-28 | End: 2024-02-28

## 2024-02-28 RX ORDER — ISOSORBIDE MONONITRATE 30 MG/1
30 TABLET, EXTENDED RELEASE ORAL DAILY
Status: DISCONTINUED | OUTPATIENT
Start: 2024-02-29 | End: 2024-03-01 | Stop reason: HOSPADM

## 2024-02-28 RX ORDER — ATORVASTATIN CALCIUM 80 MG/1
80 TABLET, FILM COATED ORAL DAILY
Status: DISCONTINUED | OUTPATIENT
Start: 2024-02-29 | End: 2024-03-01 | Stop reason: HOSPADM

## 2024-02-28 RX ORDER — HEPARIN SODIUM 5000 [USP'U]/100ML
0-30 INJECTION, SOLUTION INTRAVENOUS CONTINUOUS
Status: DISCONTINUED | OUTPATIENT
Start: 2024-02-28 | End: 2024-02-29

## 2024-02-28 RX ORDER — ACETAMINOPHEN 325 MG/1
650 TABLET ORAL EVERY 6 HOURS PRN
Status: DISCONTINUED | OUTPATIENT
Start: 2024-02-28 | End: 2024-03-01 | Stop reason: HOSPADM

## 2024-02-28 RX ORDER — OXYCODONE HYDROCHLORIDE 5 MG/1
5 TABLET ORAL EVERY 4 HOURS PRN
Status: DISCONTINUED | OUTPATIENT
Start: 2024-02-28 | End: 2024-03-01 | Stop reason: HOSPADM

## 2024-02-28 RX ORDER — CARVEDILOL 12.5 MG/1
12.5 TABLET ORAL 2 TIMES DAILY WITH MEALS
Status: DISCONTINUED | OUTPATIENT
Start: 2024-02-29 | End: 2024-03-01 | Stop reason: HOSPADM

## 2024-02-28 RX ADMIN — OXYCODONE 5 MG: 5 TABLET ORAL at 22:45

## 2024-02-28 RX ADMIN — HEPARIN SODIUM 12 UNITS/KG/HR: 5000 INJECTION, SOLUTION INTRAVENOUS at 20:18

## 2024-02-28 RX ADMIN — NITROGLYCERIN 0.5 INCH: 20 OINTMENT TOPICAL at 20:22

## 2024-02-28 RX ADMIN — FENTANYL CITRATE 50 MCG: 50 INJECTION, SOLUTION INTRAMUSCULAR; INTRAVENOUS at 21:15

## 2024-02-28 RX ADMIN — NITROGLYCERIN 0.5 INCH: 20 OINTMENT TOPICAL at 23:54

## 2024-02-28 RX ADMIN — FENTANYL CITRATE 50 MCG: 50 INJECTION, SOLUTION INTRAMUSCULAR; INTRAVENOUS at 20:14

## 2024-02-28 ASSESSMENT — PAIN DESCRIPTION - PAIN TYPE
TYPE: ACUTE PAIN

## 2024-02-28 ASSESSMENT — COGNITIVE AND FUNCTIONAL STATUS - GENERAL
CLIMB 3 TO 5 STEPS WITH RAILING: A LOT
DRESSING REGULAR LOWER BODY CLOTHING: A LOT
WALKING IN HOSPITAL ROOM: A LOT
TOILETING: A LOT
DRESSING REGULAR UPPER BODY CLOTHING: A LOT
HELP NEEDED FOR BATHING: A LOT
EATING MEALS: A LOT
STANDING UP FROM CHAIR USING ARMS: A LOT
MOVING FROM LYING ON BACK TO SITTING ON SIDE OF FLAT BED: A LOT
PERSONAL GROOMING: A LOT
TURNING FROM BACK TO SIDE WHILE IN FLAT BAD: A LOT
SUGGESTED CMS G CODE MODIFIER DAILY ACTIVITY: CL
MOVING TO AND FROM BED TO CHAIR: A LOT
DAILY ACTIVITIY SCORE: 12
MOBILITY SCORE: 12
SUGGESTED CMS G CODE MODIFIER MOBILITY: CL

## 2024-02-28 ASSESSMENT — FIBROSIS 4 INDEX: FIB4 SCORE: 1.86

## 2024-02-28 NOTE — DOCUMENTATION QUERY
[facility]                                                                       Query Response Note      PATIENT:               RAMSEY BRAND  ACCT #:                  [id number]  MRN:                     [id number]  :                      1970  ADMIT DATE:       2024 5:06 PM  DISCH DATE:        2024 12:06 PM  RESPONDING  PROVIDER #:        [id number]           QUERY TEXT:    Congestive Heart Failure is documented in the Medical Record.  After study, please specify the type and acuity of heart failure for this admission.        The patient's Clinical Indicators include:  NOTED    Triage note - Presented to ED hypertensive, CHF exacerbation with CP.   ...history of CAD status post multiple stents, prior CVA..., congestive heart failure who presented with 23 with chest pain  Heart Catheterization: single vessel coronary artery disease - drug-eluting stent in proximal  left anterior descending coronary artery.   -CXR: Low lung volumes without definite acute cardiopulmonary abnormality.  Norvasc/ Coreg/ Lasix PO Daily  () ECHO: EF 55% - Indeterminate diastolic function- Right atrium & ventricle normal in size.   History diastolic dysfunction  CAD/History multiple MIs    Uncontrolled Hypertension     Thank you,  Maggi Sloan RN, CCS  Clinical Documentation Integrity  Connect via Voalte  Options provided:   -- Chronic diastolic Heart failure   -- Acute on chronic diastolic heart failure   -- Patient does not have heart failure; Ruled out   -- Other explanation, Pls specify      Query created by: Maggi Sloan on 2024 10:16 AM    RESPONSE TEXT:    Chronic diastolic Heart failure          Electronically signed by:  JOZEF SERNA MD 2024 10:40 AM

## 2024-02-29 ENCOUNTER — APPOINTMENT (OUTPATIENT)
Dept: CARDIOLOGY | Facility: MEDICAL CENTER | Age: 54
DRG: 321 | End: 2024-02-29
Attending: INTERNAL MEDICINE
Payer: MEDICAID

## 2024-02-29 ENCOUNTER — APPOINTMENT (OUTPATIENT)
Dept: RADIOLOGY | Facility: MEDICAL CENTER | Age: 54
DRG: 321 | End: 2024-02-29
Attending: INTERNAL MEDICINE
Payer: MEDICAID

## 2024-02-29 LAB
ACT BLD: 244 SEC (ref 74–137)
ALBUMIN SERPL BCP-MCNC: 3.8 G/DL (ref 3.2–4.9)
ALBUMIN/GLOB SERPL: 1.5 G/DL
ALP SERPL-CCNC: 105 U/L (ref 30–99)
ALT SERPL-CCNC: 84 U/L (ref 2–50)
ANION GAP SERPL CALC-SCNC: 13 MMOL/L (ref 7–16)
APTT PPP: 49.7 SEC (ref 24.7–36)
APTT PPP: 89.9 SEC (ref 24.7–36)
AST SERPL-CCNC: 43 U/L (ref 12–45)
BASOPHILS # BLD AUTO: 0.5 % (ref 0–1.8)
BASOPHILS # BLD: 0.05 K/UL (ref 0–0.12)
BILIRUB SERPL-MCNC: 0.7 MG/DL (ref 0.1–1.5)
BUN SERPL-MCNC: 18 MG/DL (ref 8–22)
CALCIUM ALBUM COR SERPL-MCNC: 8.4 MG/DL (ref 8.5–10.5)
CALCIUM SERPL-MCNC: 8.2 MG/DL (ref 8.5–10.5)
CHLORIDE SERPL-SCNC: 105 MMOL/L (ref 96–112)
CO2 SERPL-SCNC: 21 MMOL/L (ref 20–33)
CREAT SERPL-MCNC: 1.14 MG/DL (ref 0.5–1.4)
EKG IMPRESSION: NORMAL
EOSINOPHIL # BLD AUTO: 0.16 K/UL (ref 0–0.51)
EOSINOPHIL NFR BLD: 1.7 % (ref 0–6.9)
ERYTHROCYTE [DISTWIDTH] IN BLOOD BY AUTOMATED COUNT: 52.1 FL (ref 35.9–50)
GFR SERPLBLD CREATININE-BSD FMLA CKD-EPI: 76 ML/MIN/1.73 M 2
GLOBULIN SER CALC-MCNC: 2.6 G/DL (ref 1.9–3.5)
GLUCOSE SERPL-MCNC: 106 MG/DL (ref 65–99)
HCT VFR BLD AUTO: 46.7 % (ref 42–52)
HGB BLD-MCNC: 16.4 G/DL (ref 14–18)
IMM GRANULOCYTES # BLD AUTO: 0.14 K/UL (ref 0–0.11)
IMM GRANULOCYTES NFR BLD AUTO: 1.5 % (ref 0–0.9)
LYMPHOCYTES # BLD AUTO: 2.49 K/UL (ref 1–4.8)
LYMPHOCYTES NFR BLD: 25.9 % (ref 22–41)
MCH RBC QN AUTO: 30.8 PG (ref 27–33)
MCHC RBC AUTO-ENTMCNC: 35.1 G/DL (ref 32.3–36.5)
MCV RBC AUTO: 87.8 FL (ref 81.4–97.8)
MONOCYTES # BLD AUTO: 0.98 K/UL (ref 0–0.85)
MONOCYTES NFR BLD AUTO: 10.2 % (ref 0–13.4)
NEUTROPHILS # BLD AUTO: 5.8 K/UL (ref 1.82–7.42)
NEUTROPHILS NFR BLD: 60.2 % (ref 44–72)
NRBC # BLD AUTO: 0 K/UL
NRBC BLD-RTO: 0 /100 WBC (ref 0–0.2)
PLATELET # BLD AUTO: 212 K/UL (ref 164–446)
PMV BLD AUTO: 10 FL (ref 9–12.9)
POTASSIUM SERPL-SCNC: 4.2 MMOL/L (ref 3.6–5.5)
PROT SERPL-MCNC: 6.4 G/DL (ref 6–8.2)
RBC # BLD AUTO: 5.32 M/UL (ref 4.7–6.1)
SODIUM SERPL-SCNC: 139 MMOL/L (ref 135–145)
TROPONIN T SERPL-MCNC: 389 NG/L (ref 6–19)
TROPONIN T SERPL-MCNC: 393 NG/L (ref 6–19)
WBC # BLD AUTO: 9.6 K/UL (ref 4.8–10.8)

## 2024-02-29 PROCEDURE — 93931 UPPER EXTREMITY STUDY: CPT | Mod: RT

## 2024-02-29 PROCEDURE — 96366 THER/PROPH/DIAG IV INF ADDON: CPT

## 2024-02-29 PROCEDURE — 99233 SBSQ HOSP IP/OBS HIGH 50: CPT | Performed by: STUDENT IN AN ORGANIZED HEALTH CARE EDUCATION/TRAINING PROGRAM

## 2024-02-29 PROCEDURE — 85730 THROMBOPLASTIN TIME PARTIAL: CPT

## 2024-02-29 PROCEDURE — 93010 ELECTROCARDIOGRAM REPORT: CPT | Mod: 77 | Performed by: INTERNAL MEDICINE

## 2024-02-29 PROCEDURE — 96375 TX/PRO/DX INJ NEW DRUG ADDON: CPT

## 2024-02-29 PROCEDURE — 36415 COLL VENOUS BLD VENIPUNCTURE: CPT

## 2024-02-29 PROCEDURE — 85025 COMPLETE CBC W/AUTO DIFF WBC: CPT

## 2024-02-29 PROCEDURE — 99153 MOD SED SAME PHYS/QHP EA: CPT

## 2024-02-29 PROCEDURE — 93458 L HRT ARTERY/VENTRICLE ANGIO: CPT

## 2024-02-29 PROCEDURE — 92928 PRQ TCAT PLMT NTRAC ST 1 LES: CPT | Mod: LD | Performed by: INTERNAL MEDICINE

## 2024-02-29 PROCEDURE — B240ZZ3 ULTRASONOGRAPHY OF SINGLE CORONARY ARTERY, INTRAVASCULAR: ICD-10-PCS | Performed by: INTERNAL MEDICINE

## 2024-02-29 PROCEDURE — 99152 MOD SED SAME PHYS/QHP 5/>YRS: CPT | Performed by: INTERNAL MEDICINE

## 2024-02-29 PROCEDURE — 770020 HCHG ROOM/CARE - TELE (206)

## 2024-02-29 PROCEDURE — 700101 HCHG RX REV CODE 250

## 2024-02-29 PROCEDURE — 80053 COMPREHEN METABOLIC PANEL: CPT

## 2024-02-29 PROCEDURE — 700111 HCHG RX REV CODE 636 W/ 250 OVERRIDE (IP)

## 2024-02-29 PROCEDURE — 700111 HCHG RX REV CODE 636 W/ 250 OVERRIDE (IP): Performed by: INTERNAL MEDICINE

## 2024-02-29 PROCEDURE — 700111 HCHG RX REV CODE 636 W/ 250 OVERRIDE (IP): Performed by: HOSPITALIST

## 2024-02-29 PROCEDURE — 93005 ELECTROCARDIOGRAM TRACING: CPT | Performed by: STUDENT IN AN ORGANIZED HEALTH CARE EDUCATION/TRAINING PROGRAM

## 2024-02-29 PROCEDURE — 85347 COAGULATION TIME ACTIVATED: CPT

## 2024-02-29 PROCEDURE — 99255 IP/OBS CONSLTJ NEW/EST HI 80: CPT | Mod: 25 | Performed by: INTERNAL MEDICINE

## 2024-02-29 PROCEDURE — 700117 HCHG RX CONTRAST REV CODE 255: Performed by: INTERNAL MEDICINE

## 2024-02-29 PROCEDURE — 93458 L HRT ARTERY/VENTRICLE ANGIO: CPT | Mod: 26,59 | Performed by: INTERNAL MEDICINE

## 2024-02-29 PROCEDURE — A9270 NON-COVERED ITEM OR SERVICE: HCPCS | Performed by: HOSPITALIST

## 2024-02-29 PROCEDURE — 93005 ELECTROCARDIOGRAM TRACING: CPT | Performed by: HOSPITALIST

## 2024-02-29 PROCEDURE — 93005 ELECTROCARDIOGRAM TRACING: CPT | Performed by: INTERNAL MEDICINE

## 2024-02-29 PROCEDURE — 4A023N7 MEASUREMENT OF CARDIAC SAMPLING AND PRESSURE, LEFT HEART, PERCUTANEOUS APPROACH: ICD-10-PCS | Performed by: INTERNAL MEDICINE

## 2024-02-29 PROCEDURE — 92978 ENDOLUMINL IVUS OCT C 1ST: CPT | Mod: 26 | Performed by: INTERNAL MEDICINE

## 2024-02-29 PROCEDURE — 93010 ELECTROCARDIOGRAM REPORT: CPT | Performed by: INTERNAL MEDICINE

## 2024-02-29 PROCEDURE — 0270346 DILATION OF CORONARY ARTERY, ONE ARTERY, BIFURCATION, WITH DRUG-ELUTING INTRALUMINAL DEVICE, PERCUTANEOUS APPROACH: ICD-10-PCS | Performed by: INTERNAL MEDICINE

## 2024-02-29 PROCEDURE — B2111ZZ FLUOROSCOPY OF MULTIPLE CORONARY ARTERIES USING LOW OSMOLAR CONTRAST: ICD-10-PCS | Performed by: INTERNAL MEDICINE

## 2024-02-29 PROCEDURE — 84484 ASSAY OF TROPONIN QUANT: CPT | Mod: 91

## 2024-02-29 PROCEDURE — 700102 HCHG RX REV CODE 250 W/ 637 OVERRIDE(OP): Performed by: HOSPITALIST

## 2024-02-29 RX ORDER — TRAZODONE HYDROCHLORIDE 100 MG/1
200 TABLET ORAL NIGHTLY
Status: ON HOLD | COMMUNITY
End: 2024-03-18

## 2024-02-29 RX ORDER — ASPIRIN 81 MG/1
81 TABLET, CHEWABLE ORAL DAILY
Status: DISCONTINUED | OUTPATIENT
Start: 2024-02-29 | End: 2024-03-01 | Stop reason: HOSPADM

## 2024-02-29 RX ORDER — CYCLOBENZAPRINE HCL 5 MG
5-10 TABLET ORAL 3 TIMES DAILY PRN
COMMUNITY

## 2024-02-29 RX ORDER — HEPARIN SODIUM 200 [USP'U]/100ML
INJECTION, SOLUTION INTRAVENOUS
Status: COMPLETED
Start: 2024-02-29 | End: 2024-02-29

## 2024-02-29 RX ORDER — LABETALOL HYDROCHLORIDE 5 MG/ML
10 INJECTION, SOLUTION INTRAVENOUS EVERY 6 HOURS PRN
Status: DISCONTINUED | OUTPATIENT
Start: 2024-02-29 | End: 2024-03-01 | Stop reason: HOSPADM

## 2024-02-29 RX ORDER — MIDAZOLAM HYDROCHLORIDE 1 MG/ML
INJECTION INTRAMUSCULAR; INTRAVENOUS
Status: COMPLETED
Start: 2024-02-29 | End: 2024-02-29

## 2024-02-29 RX ORDER — HYDRALAZINE HYDROCHLORIDE 20 MG/ML
20 INJECTION INTRAMUSCULAR; INTRAVENOUS ONCE
Status: COMPLETED | OUTPATIENT
Start: 2024-02-29 | End: 2024-02-29

## 2024-02-29 RX ORDER — HEPARIN SODIUM 5000 [USP'U]/100ML
INJECTION, SOLUTION INTRAVENOUS CONTINUOUS
Status: DISCONTINUED | OUTPATIENT
Start: 2024-02-29 | End: 2024-02-29

## 2024-02-29 RX ORDER — HEPARIN SODIUM 1000 [USP'U]/ML
INJECTION, SOLUTION INTRAVENOUS; SUBCUTANEOUS
Status: COMPLETED
Start: 2024-02-29 | End: 2024-02-29

## 2024-02-29 RX ORDER — VERAPAMIL HYDROCHLORIDE 2.5 MG/ML
INJECTION, SOLUTION INTRAVENOUS
Status: COMPLETED
Start: 2024-02-29 | End: 2024-02-29

## 2024-02-29 RX ORDER — LIDOCAINE HYDROCHLORIDE 20 MG/ML
INJECTION, SOLUTION INFILTRATION; PERINEURAL
Status: COMPLETED
Start: 2024-02-29 | End: 2024-02-29

## 2024-02-29 RX ORDER — LABETALOL HYDROCHLORIDE 5 MG/ML
10 INJECTION, SOLUTION INTRAVENOUS ONCE
Status: COMPLETED | OUTPATIENT
Start: 2024-02-29 | End: 2024-02-29

## 2024-02-29 RX ADMIN — HYDRALAZINE HYDROCHLORIDE 20 MG: 20 INJECTION, SOLUTION INTRAMUSCULAR; INTRAVENOUS at 04:56

## 2024-02-29 RX ADMIN — HEPARIN SODIUM: 1000 INJECTION, SOLUTION INTRAVENOUS; SUBCUTANEOUS at 14:27

## 2024-02-29 RX ADMIN — DIAZEPAM 5 MG: 5 TABLET ORAL at 02:02

## 2024-02-29 RX ADMIN — LABETALOL HYDROCHLORIDE 10 MG: 5 INJECTION, SOLUTION INTRAVENOUS at 03:38

## 2024-02-29 RX ADMIN — LABETALOL HYDROCHLORIDE 10 MG: 5 INJECTION, SOLUTION INTRAVENOUS at 17:45

## 2024-02-29 RX ADMIN — LOSARTAN POTASSIUM 100 MG: 50 TABLET, FILM COATED ORAL at 06:56

## 2024-02-29 RX ADMIN — AMLODIPINE BESYLATE 10 MG: 10 TABLET ORAL at 06:55

## 2024-02-29 RX ADMIN — OXYCODONE 5 MG: 5 TABLET ORAL at 03:35

## 2024-02-29 RX ADMIN — IOHEXOL 94 ML: 350 INJECTION, SOLUTION INTRAVENOUS at 16:03

## 2024-02-29 RX ADMIN — FENTANYL CITRATE 100 MCG: 50 INJECTION, SOLUTION INTRAMUSCULAR; INTRAVENOUS at 15:09

## 2024-02-29 RX ADMIN — TRAZODONE HYDROCHLORIDE 500 MG: 100 TABLET ORAL at 20:49

## 2024-02-29 RX ADMIN — ASPIRIN 81 MG 81 MG: 81 TABLET ORAL at 05:42

## 2024-02-29 RX ADMIN — ATORVASTATIN CALCIUM 80 MG: 80 TABLET, FILM COATED ORAL at 05:42

## 2024-02-29 RX ADMIN — CLOPIDOGREL BISULFATE 75 MG: 75 TABLET ORAL at 05:42

## 2024-02-29 RX ADMIN — VERAPAMIL HYDROCHLORIDE 2.5 MG: 2.5 INJECTION, SOLUTION INTRAVENOUS at 14:27

## 2024-02-29 RX ADMIN — OXYCODONE 5 MG: 5 TABLET ORAL at 20:49

## 2024-02-29 RX ADMIN — MIDAZOLAM HYDROCHLORIDE 1.5 MG: 2 INJECTION, SOLUTION INTRAMUSCULAR; INTRAVENOUS at 16:02

## 2024-02-29 RX ADMIN — CARVEDILOL 12.5 MG: 12.5 TABLET, FILM COATED ORAL at 17:00

## 2024-02-29 RX ADMIN — HEPARIN SODIUM 2000 UNITS: 200 INJECTION, SOLUTION INTRAVENOUS at 14:27

## 2024-02-29 RX ADMIN — LIDOCAINE HYDROCHLORIDE: 20 INJECTION, SOLUTION INFILTRATION; PERINEURAL at 14:27

## 2024-02-29 RX ADMIN — NITROGLYCERIN 1 INCH: 20 OINTMENT TOPICAL at 13:27

## 2024-02-29 RX ADMIN — DIAZEPAM 5 MG: 5 TABLET ORAL at 13:31

## 2024-02-29 RX ADMIN — OXYCODONE 5 MG: 5 TABLET ORAL at 16:30

## 2024-02-29 RX ADMIN — DIAZEPAM 5 MG: 5 TABLET ORAL at 22:24

## 2024-02-29 RX ADMIN — OMEPRAZOLE 40 MG: 20 CAPSULE, DELAYED RELEASE ORAL at 05:35

## 2024-02-29 RX ADMIN — TRAZODONE HYDROCHLORIDE 500 MG: 100 TABLET ORAL at 02:32

## 2024-02-29 RX ADMIN — NITROGLYCERIN 1 INCH: 20 OINTMENT TOPICAL at 17:37

## 2024-02-29 RX ADMIN — OXYCODONE 5 MG: 5 TABLET ORAL at 09:21

## 2024-02-29 RX ADMIN — FENTANYL CITRATE 100 MCG: 50 INJECTION, SOLUTION INTRAMUSCULAR; INTRAVENOUS at 15:07

## 2024-02-29 RX ADMIN — MIDAZOLAM HYDROCHLORIDE 2 MG: 2 INJECTION, SOLUTION INTRAMUSCULAR; INTRAVENOUS at 15:06

## 2024-02-29 RX ADMIN — NITROGLYCERIN 1 INCH: 20 OINTMENT TOPICAL at 06:53

## 2024-02-29 RX ADMIN — FENTANYL CITRATE 100 MCG: 50 INJECTION, SOLUTION INTRAMUSCULAR; INTRAVENOUS at 15:50

## 2024-02-29 RX ADMIN — MIDAZOLAM HYDROCHLORIDE 2 MG: 2 INJECTION, SOLUTION INTRAMUSCULAR; INTRAVENOUS at 15:09

## 2024-02-29 RX ADMIN — NITROGLYCERIN 10 ML: 20 INJECTION INTRAVENOUS at 14:27

## 2024-02-29 RX ADMIN — ISOSORBIDE MONONITRATE 30 MG: 30 TABLET, EXTENDED RELEASE ORAL at 06:57

## 2024-02-29 RX ADMIN — FENTANYL CITRATE 25 MCG: 50 INJECTION, SOLUTION INTRAMUSCULAR; INTRAVENOUS at 16:01

## 2024-02-29 ASSESSMENT — ENCOUNTER SYMPTOMS
SORE THROAT: 0
HEMOPTYSIS: 0
DOUBLE VISION: 0
VOMITING: 0
CONSTIPATION: 0
PHOTOPHOBIA: 0
ABDOMINAL PAIN: 0
CLAUDICATION: 0
TREMORS: 0
BLOOD IN STOOL: 0
DIARRHEA: 0
POLYDIPSIA: 0
DIAPHORESIS: 0
BACK PAIN: 0
ORTHOPNEA: 1
PALPITATIONS: 1
NAUSEA: 1
NECK PAIN: 0
BRUISES/BLEEDS EASILY: 0
CHILLS: 0
COUGH: 0
DIZZINESS: 1
WHEEZING: 0
EYE PAIN: 0
SHORTNESS OF BREATH: 1
HEADACHES: 0
FALLS: 0
FLANK PAIN: 0
BLURRED VISION: 0
WEAKNESS: 0
MYALGIAS: 0
PND: 1
TINGLING: 0
STRIDOR: 0
DEPRESSION: 0
HALLUCINATIONS: 0
FEVER: 0
HEARTBURN: 0
SPUTUM PRODUCTION: 0
SINUS PAIN: 0

## 2024-02-29 ASSESSMENT — COGNITIVE AND FUNCTIONAL STATUS - GENERAL
SUGGESTED CMS G CODE MODIFIER DAILY ACTIVITY: CH
MOBILITY SCORE: 24
DAILY ACTIVITIY SCORE: 24
SUGGESTED CMS G CODE MODIFIER MOBILITY: CH

## 2024-02-29 ASSESSMENT — LIFESTYLE VARIABLES
TOTAL SCORE: 0
ON A TYPICAL DAY WHEN YOU DRINK ALCOHOL HOW MANY DRINKS DO YOU HAVE: 0
SUBSTANCE_ABUSE: 0
EVER FELT BAD OR GUILTY ABOUT YOUR DRINKING: NO
HAVE YOU EVER FELT YOU SHOULD CUT DOWN ON YOUR DRINKING: NO
ALCOHOL_USE: NO
TOTAL SCORE: 0
EVER HAD A DRINK FIRST THING IN THE MORNING TO STEADY YOUR NERVES TO GET RID OF A HANGOVER: NO
DOES PATIENT WANT TO STOP DRINKING: NO
AVERAGE NUMBER OF DAYS PER WEEK YOU HAVE A DRINK CONTAINING ALCOHOL: 0
TOTAL SCORE: 0
CONSUMPTION TOTAL: NEGATIVE
HOW MANY TIMES IN THE PAST YEAR HAVE YOU HAD 5 OR MORE DRINKS IN A DAY: 0
HAVE PEOPLE ANNOYED YOU BY CRITICIZING YOUR DRINKING: NO

## 2024-02-29 ASSESSMENT — PAIN DESCRIPTION - PAIN TYPE
TYPE: ACUTE PAIN
TYPE: ACUTE PAIN;CHRONIC PAIN
TYPE: ACUTE PAIN
TYPE: ACUTE PAIN
TYPE: ACUTE PAIN;CHRONIC PAIN

## 2024-02-29 ASSESSMENT — PATIENT HEALTH QUESTIONNAIRE - PHQ9
8. MOVING OR SPEAKING SO SLOWLY THAT OTHER PEOPLE COULD HAVE NOTICED. OR THE OPPOSITE, BEING SO FIGETY OR RESTLESS THAT YOU HAVE BEEN MOVING AROUND A LOT MORE THAN USUAL: NOT AT ALL
5. POOR APPETITE OR OVEREATING: NOT AT ALL
SUM OF ALL RESPONSES TO PHQ9 QUESTIONS 1 AND 2: 1
4. FEELING TIRED OR HAVING LITTLE ENERGY: NOT AT ALL
6. FEELING BAD ABOUT YOURSELF - OR THAT YOU ARE A FAILURE OR HAVE LET YOURSELF OR YOUR FAMILY DOWN: NOT AL ALL
1. LITTLE INTEREST OR PLEASURE IN DOING THINGS: NOT AT ALL
7. TROUBLE CONCENTRATING ON THINGS, SUCH AS READING THE NEWSPAPER OR WATCHING TELEVISION: NOT AT ALL
9. THOUGHTS THAT YOU WOULD BE BETTER OFF DEAD, OR OF HURTING YOURSELF: NOT AT ALL
3. TROUBLE FALLING OR STAYING ASLEEP OR SLEEPING TOO MUCH: NOT AT ALL
SUM OF ALL RESPONSES TO PHQ QUESTIONS 1-9: 1
2. FEELING DOWN, DEPRESSED, IRRITABLE, OR HOPELESS: SEVERAL DAYS

## 2024-02-29 ASSESSMENT — FIBROSIS 4 INDEX: FIB4 SCORE: 1.2

## 2024-02-29 NOTE — ASSESSMENT & PLAN NOTE
Patient will be admitted to the telemetry unit with close cardiac monitoring, serial EKG and troponin  Patient has been given  aspirin and continue plavix and is started on IV heparin, monitor APTT  Continue Coreg and atorvastatin  Start Nitropaste  NPO at midnight  Cardiology has been consulted

## 2024-02-29 NOTE — H&P
Hospital Medicine History & Physical Note    Date of Service  2/28/2024    Primary Care Physician  Atif Gonsalez M.D.    Consultants      Specialist Names:     Code Status  Full Code    Chief Complaint  Chief Complaint   Patient presents with    Chest Pain     Pt woke up at 3 pm with chest pain  A transfer case from [facility] as a case of NSTEMI       History of Presenting Illness  Divine Lugo is a 53 y.o. male who presented 2/28/2024 with past medical history of coronary artery disease with multiple stents, CVA, hypertension, history of PE, who presents to the hospital for chest pain that has been persistent since his previous admission.  The chest pain radiates to the left arm and left neck.  The chest pain is exertional.  Patient was discharged 2 days ago after a ACS event that led to a MAYRA placement in the proximal left LAD.  It is associated with nausea, lightheadedness, dizziness, orthopnea and exertional shortness of breath.  Patient states that he is compliant with his home medications and Lasix.    Chest x-ray interpreted by me found no acute pulmonary process  EKG interpreted by me found normal sinus rhythm, LVH, Q waves in inferior leads without ST segment changes    I discussed the plan of care with patient.    Review of Systems  Review of Systems   Constitutional:  Negative for chills, diaphoresis, fever and malaise/fatigue.   HENT:  Negative for congestion, ear discharge, ear pain, hearing loss, nosebleeds, sinus pain, sore throat and tinnitus.    Eyes:  Negative for blurred vision, double vision, photophobia and pain.   Respiratory:  Positive for shortness of breath. Negative for cough, hemoptysis, sputum production, wheezing and stridor.    Cardiovascular:  Positive for chest pain, palpitations, orthopnea and PND. Negative for claudication and leg swelling.   Gastrointestinal:  Positive for nausea. Negative for abdominal pain, blood in stool, constipation, diarrhea,  heartburn, melena and vomiting.   Genitourinary:  Negative for dysuria, flank pain, frequency, hematuria and urgency.   Musculoskeletal:  Negative for back pain, falls, joint pain, myalgias and neck pain.   Skin:  Negative for itching and rash.   Neurological:  Positive for dizziness. Negative for tingling, tremors, weakness and headaches.   Endo/Heme/Allergies:  Negative for environmental allergies and polydipsia. Does not bruise/bleed easily.   Psychiatric/Behavioral:  Negative for depression, hallucinations, substance abuse and suicidal ideas.        Past Medical History   has a past medical history of Angina, Arthritis, Back fracture, Backpain, CAD (coronary artery disease), Cancer ([facility]), DVT, Fracture closed, pelvis, HTN (hypertension) (06/07/2012), Hypertension, Lipoma NOS, Myocardial infarct ([facility]), Nephrolithiasis (06/07/2012), Nonmalignant tumors, Personal history of venous thrombosis and embolism, Pneumonia, Pulmonary embolism ([facility]), Renal disorder, Stroke ([facility]), Stroke determined by clinical assessment ([facility]) (11/21/2023), and Unspecified urinary incontinence (PE).    Surgical History   has a past surgical history that includes other orthopedic surgery; other; other cardiac surgery; cystoscopy stent placement (6/3/2012); ureteroscopy (6/3/2012); and lasertripsy (6/3/2012).     Family History  family history includes Dementia in his mother.   Family history reviewed with patient. There is no family history that is pertinent to the chief complaint.     Social History   reports that he has been smoking cigarettes. He has been exposed to tobacco smoke. His smokeless tobacco use includes chew. He reports current drug use. Drug: Marijuana. He reports that he does not drink alcohol.    Allergies  Allergies   Allergen Reactions    Gabapentin Unspecified     Causes patient to seizures    Depakote [Valproic Acid] Unspecified     Non epileptic seizure ( stated by patient )    Ibuprofen      Hx kidney disease, still  takes at home     Lisinopril      Note: Built up in system and caused overdose. Body not able to process medication.    Topiramate      Note: Unknown.       Medications  Prior to Admission Medications   Prescriptions Last Dose Informant Patient Reported? Taking?   EPINEPHrine (EPIPEN) 0.3 MG/0.3ML Solution Auto-injector solution for injection  Patient No No   Sig: INJECT CONTENTS OF 1 PEN SUBCUTANEOUSLY AS NEEDED FOR ALLERGIC REACTION MAY REPEAT DOSE AFTER 5 TO 15 MINTUTES   NON SPECIFIED  Patient Yes No   Sig: Take 1 Tablet by mouth 2 times a day. KRATOM   acetaminophen (TYLENOL) 500 MG Tab  Patient No No   Sig: Take 2 Tablets by mouth 3 times a day as needed for Mild Pain.   amLODIPine (NORVASC) 10 MG Tab   No No   Sig: Take 1 Tablet by mouth every day.   apixaban (ELIQUIS) 5mg Tab   No No   Sig: Take 1 Tablet by mouth 2 times a day.   atorvastatin (LIPITOR) 80 MG tablet   No No   Sig: Take 1 Tablet by mouth every day.   carvedilol (COREG) 12.5 MG Tab   No No   Sig: Take 1 Tablet by mouth 2 times a day.   clopidogrel (PLAVIX) 75 MG Tab   No No   Sig: Take 1 Tablet by mouth every day.   cyclobenzaprine (FLEXERIL) 10 mg Tab  Patient No No   Sig: Take 1 Tablet by mouth 2 times a day as needed for Muscle Spasms.   Patient taking differently: Take 10 mg by mouth 3 times a day as needed for Muscle Spasms.   diphenhydrAMINE (BENADRYL ALLERGY) 25 MG capsule  Patient Yes No   Sig: Take 25 mg by mouth at bedtime as needed (congestion).   furosemide (LASIX) 40 MG Tab   No No   Sig: Take 1 Tablet by mouth every day.   isosorbide mononitrate SR (IMDUR) 30 MG TABLET SR 24 HR   No No   Sig: Take 1 Tablet by mouth every day.   losartan (COZAAR) 100 MG Tab   No No   Sig: Take 1 Tablet by mouth every day.   nitroglycerin (NITROSTAT) 0.4 MG SL Tab  Patient No No   Sig: Place 1 Tablet under the tongue as needed for Chest Pain for up to 30 days. DISSOLVE ONE TABLET UNDER THE TONGUE EVERY 5 MINUTES AS NEEDED FOR CHEST PAIN DO NOT  EXCEED A TOTAL OF 3 DOSES IN 15 MINUTES   omeprazole (PRILOSEC) 40 MG delayed-release capsule  Patient Yes No   Sig: Take 40 mg by mouth every day.   ondansetron (ZOFRAN ODT) 4 MG TABLET DISPERSIBLE  Patient No No   Sig: Take 1 Tablet by mouth every 8 hours as needed for Nausea/Vomiting.   oxyCODONE-acetaminophen (PERCOCET) 5-325 MG Tab   No No   Sig: Take 1 Tablet by mouth every 6 hours as needed for Severe Pain for up to 5 days.   potassium chloride SA (KDUR) 20 MEQ Tab CR  Patient Yes No   Sig: Take 20 mEq by mouth every day.   trazodone (DESYREL) 300 MG tablet  Patient Yes No   Sig: Take 300 mg by mouth every evening. Take with 200 mg (2 tabs of 100 mg) for total dose of 500 mg      Facility-Administered Medications: None       Physical Exam  Temp:  [36.8 °C (98.2 °F)] 36.8 °C (98.2 °F)  Pulse:  [76-83] 83  Resp:  [15-20] 20  BP: (152-180)/() 173/87  SpO2:  [92 %-94 %] 93 %  Blood Pressure: (!) 174/101   Temperature: 36.8 °C (98.2 °F)   Pulse: 79   Respiration: 20   Pulse Oximetry: 94 %       Physical Exam  Vitals and nursing note reviewed.   Constitutional:       General: He is not in acute distress.     Appearance: Normal appearance. He is not ill-appearing, toxic-appearing or diaphoretic.   HENT:      Head: Normocephalic and atraumatic.      Nose: No congestion or rhinorrhea.      Mouth/Throat:      Pharynx: No posterior oropharyngeal erythema.   Eyes:      General: No scleral icterus.        Right eye: No discharge.   Cardiovascular:      Rate and Rhythm: Normal rate and regular rhythm.      Pulses: Normal pulses.      Heart sounds: Normal heart sounds. No murmur heard.     No friction rub. No gallop.   Pulmonary:      Effort: Pulmonary effort is normal. No respiratory distress.      Breath sounds: Normal breath sounds. No stridor. No wheezing, rhonchi or rales.   Abdominal:      General: There is no distension.      Tenderness: There is no abdominal tenderness.   Musculoskeletal:         General: No  "swelling, tenderness, deformity or signs of injury.      Cervical back: Normal range of motion.      Right lower leg: No edema.      Left lower leg: No edema.   Skin:     Capillary Refill: Capillary refill takes more than 3 seconds.      Coloration: Skin is not jaundiced or pale.      Findings: No bruising, erythema, lesion or rash.   Neurological:      General: No focal deficit present.      Mental Status: He is alert and oriented to person, place, and time.         Laboratory:  Recent Labs     02/26/24 0034 02/28/24 1953   WBC 9.9 9.9   RBC 5.48 5.25   HEMOGLOBIN 16.8 16.1   HEMATOCRIT 48.3 45.7   MCV 88.1 87.0   MCH 30.7 30.7   MCHC 34.8 35.2   RDW 52.5* 52.3*   PLATELETCT 218 230   MPV 9.6 10.1     Recent Labs     02/26/24 0034 02/28/24 2045   SODIUM 140 142   POTASSIUM 4.3 4.4   CHLORIDE 106 107   CO2 23 22   GLUCOSE 110* 101*   BUN 18 20   CREATININE 1.22 1.34   CALCIUM 8.5 8.3*     Recent Labs     02/26/24 0034 02/28/24 2045   ALTSGPT  --  96*   ASTSGOT  --  63*   ALKPHOSPHAT  --  103*   TBILIRUBIN  --  0.5   GLUCOSE 110* 101*     Recent Labs     02/28/24 1953   APTT 48.9*   INR 1.01     No results for input(s): \"NTPROBNP\" in the last 72 hours.      Recent Labs     02/28/24 2045   TROPONINT 451*       Imaging:  DX-CHEST-PORTABLE (1 VIEW)   Final Result         1. No acute cardiopulmonary abnormalities are identified.          X-Ray:  I have personally reviewed the images and compared with prior images.  EKG:  I have personally reviewed the images and compared with prior images.    Assessment/Plan:  Justification for Admission Status  I anticipate this patient will require at least two midnights for appropriate medical management, necessitating inpatient admission because NSTEMI    Patient will need a Telemetry bed on MEDICAL service .  The need is secondary to NSTEMI.    * NSTEMI (non-ST elevated myocardial infarction) (HCC)- (present on admission)  Assessment & Plan  Patient will be admitted to the " telemetry unit with close cardiac monitoring, serial EKG and troponin  Patient has been given  aspirin and continue plavix and is started on IV heparin, monitor APTT  Continue Coreg and atorvastatin  Start Nitropaste  NPO at midnight  Cardiology has been consulted      Chronic neck pain- (present on admission)  Assessment & Plan  Continue Valium for muscle spasms  The patient was given fentanyl in the ER prior to his urine drug screen    Primary hypertension- (present on admission)  Assessment & Plan  Patient presented with uncontrolled blood pressure  We will restart his home blood pressure medications and he will likely need to be adjusted  In addition he does have nitro paste and IV labetalol as needed    Tobacco abuse- (present on admission)  Assessment & Plan  Tobacco cessation education provided for more than 5 minutes.  We discussed the risks of smoking including increased risk of heart disease, stroke, cancer and COPD. We discussed the benefits of quitting smoking. We discussed options of nicotine patch, wellbutrin and chantix.  The patient has the intention to quit smoking. Nicotine replacement protocol will be provided to the patient.          VTE prophylaxis: SCDs/TEDs

## 2024-02-29 NOTE — ASSESSMENT & PLAN NOTE
Patient presented with uncontrolled blood pressure  We will restart his home blood pressure medications and he will likely need to be adjusted  In addition he does have nitro paste and IV labetalol as needed

## 2024-02-29 NOTE — PROGRESS NOTES
4 Eyes Skin Assessment Completed by JACQUELINE Corona and JACQUELINE Jacobs.    Head WDL  Ears WDL  Nose WDL  Mouth WDL  Neck WDL  Breast/Chest WDL  Shoulder Blades WDL  Spine WDL  (R) Arm/Elbow/Hand WDL  (L) Arm/Elbow/Hand WDL  Abdomen WDL  Groin WDL  Scrotum/Coccyx/Buttocks WDL  (R) Leg WDL  (L) Leg WDL  (R) Heel/Foot/Toe WDL  (L) Heel/Foot/Toe WDL          Devices In Places Tele Box and Pulse Ox      Interventions In Place Pressure Redistribution Mattress    Possible Skin Injury No    Pictures Uploaded Into Epic N/A  Wound Consult Placed N/A  RN Wound Prevention Protocol Ordered No

## 2024-02-29 NOTE — ED NOTES
Med rec complete per patient/Significant other over phone  Allergies reviewed.   Outpatient antibiotics in the last 30 days? No   Anticoagulants taken in the last 14 days? Yes   Anticoagulant: Eliquis, Last dose: 2/28/24 @ 1000.     Pharmacy patient utilizes: [email or web address] ([phone])    Melva Pickering, CELEhT

## 2024-02-29 NOTE — PROGRESS NOTES
Hospital Medicine Daily Progress Note    Date of Service  2/29/2024    Chief Complaint  Divine Lugo is a 53 y.o. male admitted 2/28/2024 with Chest pain    Hospital Course  Divine Lugo is a 53 y.o. male who presented 2/28/2024 with past medical history of coronary artery disease with multiple stents, CVA, hypertension, history of PE, who presents to the hospital for chest pain that has been persistent since his previous admission.  The chest pain radiates to the left arm and left neck.  The chest pain is exertional.  Patient was discharged 2 days ago after a ACS event that led to a MAYRA placement in the proximal left LAD.  It is associated with nausea, lightheadedness, dizziness, orthopnea and exertional shortness of breath.  Patient states that he is compliant with his home medications and Lasix.     Chest x-ray interpreted by me found no acute pulmonary process  EKG interpreted by me found normal sinus rhythm, LVH, Q waves in inferior leads without ST segment changes    Interval Problem Update  Examined in patient room, patient is afebrile pulse in the 60s to 80s respirate 19-24 systolic blood pressure 117-147 pulse ox 80 to 96% on room air.  Labs reviewed unremarkable CBC, CMP mild hyperglycemia mildly low calcium ALT 84Troponin T from 451 393, on heparin drip.  I discussed with Dr. Armstrong plans to take patient to Cath Lab this afternoon.  No acute complaints, no ongoing chest pain. Keep NPO for now, can eat following cath.    I have discussed this patient's plan of care and discharge plan at IDT rounds today with Case Management, Nursing, Nursing leadership, and other members of the IDT team.    Consultants/Specialty  cardiology    Code Status  Full Code    Disposition  The patient is not medically cleared for discharge to home or a post-acute facility.      I have placed the appropriate orders for post-discharge needs.    Review of Systems  ROS   Review of Systems   Constitutional:  Negative  for chills, diaphoresis, fever and malaise/fatigue.   HENT:  Negative for congestion, ear discharge, ear pain, hearing loss, nosebleeds, sinus pain, sore throat and tinnitus.    Eyes:  Negative for blurred vision, double vision, photophobia and pain.   Respiratory:  Positive for shortness of breath. Negative for cough, hemoptysis, sputum production, wheezing and stridor.    Cardiovascular:  Positive for chest pain, palpitations, orthopnea and PND. Negative for claudication and leg swelling.   Gastrointestinal:  Positive for nausea. Negative for abdominal pain, blood in stool, constipation, diarrhea, heartburn, melena and vomiting.   Genitourinary:  Negative for dysuria, flank pain, frequency, hematuria and urgency.   Musculoskeletal:  Negative for back pain, falls, joint pain, myalgias and neck pain.   Skin:  Negative for itching and rash.   Neurological:  Positive for dizziness. Negative for tingling, tremors, weakness and headaches.   Endo/Heme/Allergies:  Negative for environmental allergies and polydipsia. Does not bruise/bleed easily.   Psychiatric/Behavioral:  Negative for depression, hallucinations, substance abuse and suicidal ideas.     Physical Exam  Temp:  [36.8 °C (98.2 °F)] 36.8 °C (98.2 °F)  Pulse:  [62-89] 73  Resp:  [12-24] 24  BP: ()/() 117/67  SpO2:  [91 %-97 %] 92 %    Physical Exam  Vitals and nursing note reviewed.   Constitutional:       General: He is not in acute distress.     Appearance: He is obese. He is not toxic-appearing.   HENT:      Head: Normocephalic and atraumatic.      Nose: Nose normal. No rhinorrhea.      Mouth/Throat:      Mouth: Mucous membranes are dry.      Pharynx: Oropharynx is clear.   Eyes:      General: No scleral icterus.     Extraocular Movements: Extraocular movements intact.      Conjunctiva/sclera: Conjunctivae normal.   Cardiovascular:      Rate and Rhythm: Normal rate and regular rhythm.      Pulses: Normal pulses.   Pulmonary:      Effort: Pulmonary  effort is normal. No respiratory distress.      Breath sounds: Normal breath sounds. No wheezing, rhonchi or rales.   Abdominal:      General: Bowel sounds are normal.      Palpations: Abdomen is soft.      Tenderness: There is no abdominal tenderness. There is no guarding or rebound.   Musculoskeletal:         General: No tenderness or deformity. Normal range of motion.      Cervical back: Normal range of motion and neck supple. No rigidity.      Right lower leg: No edema.      Left lower leg: No edema.   Skin:     General: Skin is warm and dry.      Capillary Refill: Capillary refill takes 2 to 3 seconds.      Findings: No erythema or rash.   Neurological:      General: No focal deficit present.      Mental Status: He is alert and oriented to person, place, and time.      Cranial Nerves: No cranial nerve deficit.      Sensory: No sensory deficit.      Motor: No weakness.      Coordination: Coordination normal.   Psychiatric:         Mood and Affect: Mood normal.         Behavior: Behavior normal.         Thought Content: Thought content normal.         Judgment: Judgment normal.         Fluids    Intake/Output Summary (Last 24 hours) at 2/29/2024 1123  Last data filed at 2/29/2024 0458  Gross per 24 hour   Intake --   Output 800 ml   Net -800 ml       Laboratory  Recent Labs     02/28/24 1953 02/29/24  0340   WBC 9.9 9.6   RBC 5.25 5.32   HEMOGLOBIN 16.1 16.4   HEMATOCRIT 45.7 46.7   MCV 87.0 87.8   MCH 30.7 30.8   MCHC 35.2 35.1   RDW 52.3* 52.1*   PLATELETCT 230 212   MPV 10.1 10.0     Recent Labs     02/28/24 2045 02/29/24  0340   SODIUM 142 139   POTASSIUM 4.4 4.2   CHLORIDE 107 105   CO2 22 21   GLUCOSE 101* 106*   BUN 20 18   CREATININE 1.34 1.14   CALCIUM 8.3* 8.2*     Recent Labs     02/28/24 1953 02/29/24  0622   APTT 48.9* 49.7*   INR 1.01  --                Imaging  DX-CHEST-PORTABLE (1 VIEW)   Final Result         1. No acute cardiopulmonary abnormalities are identified.      CL-LEFT HEART  CATHETERIZATION WITH POSSIBLE INTERVENTION    (Results Pending)   EC-ECHOCARDIOGRAM COMPLETE W/O CONT    (Results Pending)        Assessment/Plan  * NSTEMI (non-ST elevated myocardial infarction) (HCC)- (present on admission)  Assessment & Plan  Patient will be admitted to the telemetry unit with close cardiac monitoring, serial EKG and troponin  Patient has been given  aspirin and continue plavix and is started on IV heparin, monitor APTT  Continue Coreg and atorvastatin  Start Nitropaste  NPO at midnight  Cardiology has been consulted      Tobacco abuse- (present on admission)  Assessment & Plan  Tobacco cessation education provided for more than 5 minutes.  We discussed the risks of smoking including increased risk of heart disease, stroke, cancer and COPD. We discussed the benefits of quitting smoking. We discussed options of nicotine patch, wellbutrin and chantix.  The patient has the intention to quit smoking. Nicotine replacement protocol will be provided to the patient.      Chronic neck pain- (present on admission)  Assessment & Plan  Continue Valium for muscle spasms  The patient was given fentanyl in the ER prior to his urine drug screen    Primary hypertension- (present on admission)  Assessment & Plan  Patient presented with uncontrolled blood pressure  We will restart his home blood pressure medications and he will likely need to be adjusted  In addition he does have nitro paste and IV labetalol as needed         VTE prophylaxis:    therapeutic anticoagulation with weight-based heparin gtt, pharmacy to adjust PRN      I have performed a physical exam and reviewed and updated ROS and Plan today (2/29/2024). In review of yesterday's note (2/28/2024), there are no changes except as documented above.  Total time spent 52 minutes. I spent greater than 50% of the time for patient care, counseling, and coordination on this date, including unit/floor time, and face-to-face time with the patient as per interval  events, my own review of patient's imaging and lab analysis and developing my assessment and plan above.

## 2024-02-29 NOTE — CONSULTS
CARDIOLOGY CONSULTATION NOTE      Reason of Consult: NSTEMI    Consulting Physician: Sylvester Painter MD    HPI:  53-year-old male with history of premature ASCVD / MI / PCI [id number] (LAD and RCA), current smoker, CVA, HTN, PTSD, PE on Apixaban, prior renal cancer status post chemo/surgery 2014, mild/moderate AI is here for chest pain.    Recently hospitalized for NSTEMI 2/24/24, status post proximal LAD PCI (Synergy Megatron 4.5 x 12 mm MAYRA) and PTCA of D1 (2/25/24 with Dr. Wharton).  Discharged on apixaban and Plavix.    Since discharge he noticed left sided CP different than his presenting CP on 2/24, without radiation, progressively worsened over last 2 days and woke him up from sleep last night. He was not sure if related to panic attack. Adherent to his meds. Cut down on smoking from 2ppd to 3 cig/day. Nitro somewhat helped his pain. In the ER feeling better with residual soreness.    ,  2/25/24    Trop T: 157 (2/24/24) ---> 212 - 451 - 389 - 393 (2/29/24)    Personally reviewed EKGs:  2/29/24: mild inf ST depressions new    TTE 11/2023:   Normal LVEF, Mild AI    PCI at [facility]: 2/25/24 personally reviewed images  IVUS guided angioplasty and placement of a 4.5 by 12mm Synergy megatron drug-eluting stent in proximal  left anterior descending coronary artery. Prox to pre-existing mid LAD stent  Angioplasty of first diagonal branch (large territory)    Coronary angiography/left heart catheterization 9/4/21: Florencio Morfin  · Moderate LAD ISR with negative IFR  · Moderate RCA with abnormal IFR s/p successful PCI: Synergy 2.5 x 38mm MAYRA  · Moderate AI by aortogram  · Normal LVEDP and no significant stenosis with pullback.       Past Medical History:   Diagnosis Date    Angina     Arthritis     Back fracture     T12 THRU LUMBER FRACTURED         Backpain     CAD (coronary artery disease)     mi    Cancer (HCC)     DVT     Fracture closed, pelvis     RIGHT    HTN (hypertension) 06/07/2012     Hypertension     Lipoma NOS     Myocardial infarct (HCC)     Nephrolithiasis 06/07/2012    Nonmalignant tumors     LUNG NECK SHOULDER    Personal history of venous thrombosis and embolism     PE, RLE DVT    Pneumonia     Pulmonary embolism (HCC)     Renal disorder     KIDNEY STONES    Stroke ([facility])     Stroke determined by clinical assessment ([facility]) 11/21/2023    Unspecified urinary incontinence PE       Social History     Socioeconomic History    Marital status: Single     Spouse name: Not on file    Number of children: Not on file    Years of education: Not on file    Highest education level: Not on file   Occupational History    Not on file   Tobacco Use    Smoking status: Every Day     Types: Cigarettes     Passive exposure: Current    Smokeless tobacco: Current     Types: Chew    Tobacco comments:      chews/1 can q 2 days, smoke 2  cigs per day   Vaping Use    Vaping Use: Never used   Substance and Sexual Activity    Alcohol use: No     Comment: 23 year ago quit    Drug use: Yes     Types: Marijuana     Comment: at bedtime    Sexual activity: Not on file   Other Topics Concern    Not on file   Social History Narrative    ** Merged History Encounter **          Social Determinants of Health     Financial Resource Strain: Not on file   Food Insecurity: Not on file   Transportation Needs: Not on file   Physical Activity: Not on file   Stress: Not on file   Social Connections: Not on file   Intimate Partner Violence: Not on file   Housing Stability: Not on file       No current facility-administered medications on file prior to encounter.     Current Outpatient Medications on File Prior to Encounter   Medication Sig Dispense Refill    cyclobenzaprine (FLEXERIL) 5 mg tablet Take 5-10 mg by mouth 3 times a day as needed.      traZODone (DESYREL) 100 MG Tab Take 200 mg by mouth every evening. 150 mg X 2 tablets with 100 mg X 2 tablets for a total nightly dose of 500 mg      carvedilol (COREG) 12.5 MG Tab Take 1 Tablet  by mouth 2 times a day. 60 Tablet 1    atorvastatin (LIPITOR) 80 MG tablet Take 1 Tablet by mouth every day. 30 Tablet 1    apixaban (ELIQUIS) 5mg Tab Take 1 Tablet by mouth 2 times a day. 60 Tablet 1    amLODIPine (NORVASC) 10 MG Tab Take 1 Tablet by mouth every day. 30 Tablet 1    furosemide (LASIX) 40 MG Tab Take 1 Tablet by mouth every day. 30 Tablet 1    clopidogrel (PLAVIX) 75 MG Tab Take 1 Tablet by mouth every day. 90 Tablet 1    losartan (COZAAR) 100 MG Tab Take 1 Tablet by mouth every day. 30 Tablet 1    isosorbide mononitrate SR (IMDUR) 30 MG TABLET SR 24 HR Take 1 Tablet by mouth every day. 30 Tablet 0    oxyCODONE-acetaminophen (PERCOCET) 5-325 MG Tab Take 1 Tablet by mouth every 6 hours as needed for Severe Pain for up to 5 days. 20 Tablet 0    NON SPECIFIED Take 1 Tablet by mouth 2 times a day. KRATOM      potassium chloride SA (KDUR) 20 MEQ Tab CR Take 20 mEq by mouth every day.      omeprazole (PRILOSEC) 40 MG delayed-release capsule Take 40 mg by mouth every day.      traZODone (DESYREL) 150 MG Tab Take 300 mg by mouth every evening. 150 mg X 2 tablets with 100 mg X 2 tablets for a total nightly dose of 500 mg      diphenhydrAMINE (BENADRYL ALLERGY) 25 MG capsule Take 25 mg by mouth at bedtime as needed (congestion).      acetaminophen (TYLENOL) 500 MG Tab Take 2 Tablets by mouth 3 times a day as needed for Mild Pain. 180 Tablet 0    EPINEPHrine (EPIPEN) 0.3 MG/0.3ML Solution Auto-injector solution for injection INJECT CONTENTS OF 1 PEN SUBCUTANEOUSLY AS NEEDED FOR ALLERGIC REACTION MAY REPEAT DOSE AFTER 5 TO 15 MINTUTES 2 Each 0    nitroglycerin (NITROSTAT) 0.4 MG SL Tab Place 1 Tablet under the tongue as needed for Chest Pain for up to 30 days. DISSOLVE ONE TABLET UNDER THE TONGUE EVERY 5 MINUTES AS NEEDED FOR CHEST PAIN DO NOT EXCEED A TOTAL OF 3 DOSES IN 15 MINUTES 25 Tablet 0    ondansetron (ZOFRAN ODT) 4 MG TABLET DISPERSIBLE Take 1 Tablet by mouth every 8 hours as needed for Nausea/Vomiting.  12 Tablet 0       Current Facility-Administered Medications   Medication Dose Frequency Provider Last Rate Last Admin    aspirin (Asa) chewable tab 81 mg  81 mg DAILY Arnol Still M.D.   81 mg at 02/29/24 0542    heparin infusion 25,000 Units in 500 mL 0.45% NACL   Continuous Arnol Still M.D. 29 mL/hr at 02/29/24 0719 1,450 Units/hr at 02/29/24 0719    labetalol (Normodyne/Trandate) injection 10 mg  10 mg Q6HRS PRN Arnol Still M.D.   10 mg at 02/29/24 0338    nitroglycerin (Nitro-Bid) 2 % ointment 1 Inch  1 Inch Q6HRS Arnol Still M.D.   1 Inch at 02/29/24 0653    acetaminophen (Tylenol) tablet 650 mg  650 mg Q6HRS PRN Arnol Still M.D.        oxyCODONE immediate-release (Roxicodone) tablet 5 mg  5 mg Q4HRS PRN Arnol Still M.D.   5 mg at 02/29/24 0335    amLODIPine (Norvasc) tablet 10 mg  10 mg DAILY Arnol Still M.D.   10 mg at 02/29/24 0655    atorvastatin (Lipitor) tablet 80 mg  80 mg DAILY Arnol Still M.D.   80 mg at 02/29/24 0542    carvedilol (Coreg) tablet 12.5 mg  12.5 mg BID WITH MEALS Arnol Still M.D.        clopidogrel (Plavix) tablet 75 mg  75 mg DAILY Arnol Still M.D.   75 mg at 02/29/24 0542    isosorbide mononitrate SR (Imdur) tablet 30 mg  30 mg DAILY Arnol Still M.D.   30 mg at 02/29/24 0657    losartan (Cozaar) tablet 100 mg  100 mg DAILY Arnol Still M.D.   100 mg at 02/29/24 0656    traZODone (Desyrel) tablet 500 mg  500 mg Nightly Arnol Still M.D.   500 mg at 02/29/24 0232    diazePAM (Valium) tablet 5 mg  5 mg Q6HRS PRN Arnol Still M.D.   5 mg at 02/29/24 0202    omeprazole (PriLOSEC) capsule 40 mg  40 mg DAILY Arnol Still M.D.   40 mg at 02/29/24 0535   Last reviewed on 2/29/2024 12:36 AM by Clementina Jon     Gabapentin, Depakote [valproic acid], Ibuprofen, Lisinopril, and Topiramate    Family History   Problem Relation Age of Onset    Dementia Mother        ROS: As per HPI all other systems reviewed and negative     Physical Exam   Blood pressure (!) 147/69,  "pulse 81, temperature 36.8 °C (98.2 °F), temperature source Temporal, resp. rate 20, height 1.727 m (5' 8\"), weight 101 kg (223 lb), SpO2 94%.    Constitutional:  Appears in NAD,  HENT: Normocephalic  Neck: No JVD present  Cardiovascular: Normal rate. Exam reveals no gallop and no friction rub. No murmur heard.   Pulmonary/Chest: CTAB   Musculoskeletal:  Pulses present. No atrophy. Strength normal.  Extremities: Exhibits no edema. No clubbing or cyanosis.   Skin: Skin is warm and dry.   Neuro: Non-focal, CN 2-12 intact grossly      Intake/Output Summary (Last 24 hours) at 2/29/2024 0837  Last data filed at 2/29/2024 0458  Gross per 24 hour   Intake --   Output 800 ml   Net -800 ml       Recent Labs     02/28/24 1953 02/29/24  0340   WBC 9.9 9.6   RBC 5.25 5.32   HEMOGLOBIN 16.1 16.4   HEMATOCRIT 45.7 46.7   MCV 87.0 87.8   MCH 30.7 30.8   MCHC 35.2 35.1   RDW 52.3* 52.1*   PLATELETCT 230 212   MPV 10.1 10.0     Recent Labs     02/28/24 2045 02/29/24  0340   SODIUM 142 139   POTASSIUM 4.4 4.2   CHLORIDE 107 105   CO2 22 21   GLUCOSE 101* 106*   BUN 20 18   CREATININE 1.34 1.14   CALCIUM 8.3* 8.2*     Recent Labs     02/28/24 1953 02/29/24  0622   APTT 48.9* 49.7*   INR 1.01  --                      Imaging reviewed    Impressions:  # Unstable angina, trop trending down since recent NSTEMI admission  # Concern for D1 disease as cause for his symptoms, got PTCA for it 2/25/24  # DL, smoker cutting down, history of PE on chronic AC, HTN, premature ASCVD    Recommendations:  - continue all cardiac home meds except Apixaban  - no need for hep gtt at this time  - CA/LHC and possible PCI to check on that diagonal branch mainly  - TTE  - further recs pending cath. NPO    Discussed risks, benefits, rationale, appropriateness and alternatives to coronary angiography with IV sedation in great detail with the patient.  Complications including but not limited to death, stroke, MI, urgent bypass surgery, contrast nephropathy, " vascular complications, bleeding and infection were explained.  In addition, we discussed that 10% of patients will experience small to moderate bruising at the side of the arterial puncture.  Risks of major complications such as heart attack or stroke caused by the angiogram is less than 1%; the risk of death is approximately 1 in 1000.  The potential outcomes associated with the procedure (possible PCI, possible CABG, possible medical Rx only) were also discussed at length.  Patient voices understanding and with shared decision making, is in agreement to proceed.     We will follow. Please contact me with any questions.     Discussed with the referring physician and bedside nursing.    Please note that this dictation was created using voice recognition software. There may be errors I did not discover before finalizing the note.     Jin Armstrong MD, MPH [facility]  Interventional Cardiologist  [facility]   of Clinical Internal Medicine - [facility] [place]

## 2024-02-29 NOTE — ASSESSMENT & PLAN NOTE
Continue Valium for muscle spasms  The patient was given fentanyl in the ER prior to his urine drug screen

## 2024-02-29 NOTE — CONSULTS
CARDIOLOGY CONSULTATION NOTE      Reason of Consult: NSTEMI    Consulting Physician: Sylvester Painter MD    HPI:  53-year-old male with history of premature ASCVD / MI / PCI [id number] (LAD and RCA), current smoker, CVA, HTN, PTSD, PE on Apixaban, prior renal cancer status post chemo/surgery 2014, mild/moderate AI is here for chest pain.    Recently hospitalized for NSTEMI 2/24/24, status post proximal LAD PCI (Synergy Megatron 4.5 x 12 mm MAYRA) and PTCA of D1 (2/25/24 with Dr. Wharton).  Discharged on apixaban and Plavix.    Since discharge he noticed left sided CP different than his presenting CP on 2/24, without radiation, progressively worsened over last 2 days and woke him up from sleep last night. He was not sure if related to panic attack. Adherent to his meds. Cut down on smoking from 2ppd to 3 cig/day. Nitro somewhat helped his pain. In the ER feeling better with residual soreness.    ,  2/25/24    Trop T: 157 (2/24/24) ---> 212 - 451 - 389 - 393 (2/29/24)    TTE 11/2023:   Normal LVEF, Mild AI    PCI at [facility]: 2/25/24 personally reviewed images  IVUS guided angioplasty and placement of a 4.5 by 12mm Synergy megatron drug-eluting stent in proximal  left anterior descending coronary artery. Prox to pre-existing mid LAD stent  Angioplasty of first diagonal branch (large territory)    Coronary angiography/left heart catheterization 9/4/21: Florencio Morfin  · Moderate LAD ISR with negative IFR  · Moderate RCA with abnormal IFR s/p successful PCI: Synergy 2.5 x 38mm MAYRA  · Moderate AI by aortogram  · Normal LVEDP and no significant stenosis with pullback.       Past Medical History:   Diagnosis Date    Angina     Arthritis     Back fracture     T12 THRU LUMBER FRACTURED         Backpain     CAD (coronary artery disease)     mi    Cancer (HCC)     DVT     Fracture closed, pelvis     RIGHT    HTN (hypertension) 06/07/2012    Hypertension     Lipoma NOS     Myocardial infarct (HCC)      Nephrolithiasis 06/07/2012    Nonmalignant tumors     LUNG NECK SHOULDER    Personal history of venous thrombosis and embolism     PE, RLE DVT    Pneumonia     Pulmonary embolism (HCC)     Renal disorder     KIDNEY STONES    Stroke ([facility])     Stroke determined by clinical assessment ([facility]) 11/21/2023    Unspecified urinary incontinence PE       Social History     Socioeconomic History    Marital status: Single     Spouse name: Not on file    Number of children: Not on file    Years of education: Not on file    Highest education level: Not on file   Occupational History    Not on file   Tobacco Use    Smoking status: Every Day     Types: Cigarettes     Passive exposure: Current    Smokeless tobacco: Current     Types: Chew    Tobacco comments:      chews/1 can q 2 days, smoke 2  cigs per day   Vaping Use    Vaping Use: Never used   Substance and Sexual Activity    Alcohol use: No     Comment: 23 year ago quit    Drug use: Yes     Types: Marijuana     Comment: at bedtime    Sexual activity: Not on file   Other Topics Concern    Not on file   Social History Narrative    ** Merged History Encounter **          Social Determinants of Health     Financial Resource Strain: Not on file   Food Insecurity: Not on file   Transportation Needs: Not on file   Physical Activity: Not on file   Stress: Not on file   Social Connections: Not on file   Intimate Partner Violence: Not on file   Housing Stability: Not on file       No current facility-administered medications on file prior to encounter.     Current Outpatient Medications on File Prior to Encounter   Medication Sig Dispense Refill    cyclobenzaprine (FLEXERIL) 5 mg tablet Take 5-10 mg by mouth 3 times a day as needed.      traZODone (DESYREL) 100 MG Tab Take 200 mg by mouth every evening. 150 mg X 2 tablets with 100 mg X 2 tablets for a total nightly dose of 500 mg      carvedilol (COREG) 12.5 MG Tab Take 1 Tablet by mouth 2 times a day. 60 Tablet 1    atorvastatin (LIPITOR)  80 MG tablet Take 1 Tablet by mouth every day. 30 Tablet 1    apixaban (ELIQUIS) 5mg Tab Take 1 Tablet by mouth 2 times a day. 60 Tablet 1    amLODIPine (NORVASC) 10 MG Tab Take 1 Tablet by mouth every day. 30 Tablet 1    furosemide (LASIX) 40 MG Tab Take 1 Tablet by mouth every day. 30 Tablet 1    clopidogrel (PLAVIX) 75 MG Tab Take 1 Tablet by mouth every day. 90 Tablet 1    losartan (COZAAR) 100 MG Tab Take 1 Tablet by mouth every day. 30 Tablet 1    isosorbide mononitrate SR (IMDUR) 30 MG TABLET SR 24 HR Take 1 Tablet by mouth every day. 30 Tablet 0    oxyCODONE-acetaminophen (PERCOCET) 5-325 MG Tab Take 1 Tablet by mouth every 6 hours as needed for Severe Pain for up to 5 days. 20 Tablet 0    NON SPECIFIED Take 1 Tablet by mouth 2 times a day. KRATOM      potassium chloride SA (KDUR) 20 MEQ Tab CR Take 20 mEq by mouth every day.      omeprazole (PRILOSEC) 40 MG delayed-release capsule Take 40 mg by mouth every day.      traZODone (DESYREL) 150 MG Tab Take 300 mg by mouth every evening. 150 mg X 2 tablets with 100 mg X 2 tablets for a total nightly dose of 500 mg      diphenhydrAMINE (BENADRYL ALLERGY) 25 MG capsule Take 25 mg by mouth at bedtime as needed (congestion).      acetaminophen (TYLENOL) 500 MG Tab Take 2 Tablets by mouth 3 times a day as needed for Mild Pain. 180 Tablet 0    EPINEPHrine (EPIPEN) 0.3 MG/0.3ML Solution Auto-injector solution for injection INJECT CONTENTS OF 1 PEN SUBCUTANEOUSLY AS NEEDED FOR ALLERGIC REACTION MAY REPEAT DOSE AFTER 5 TO 15 MINTUTES 2 Each 0    nitroglycerin (NITROSTAT) 0.4 MG SL Tab Place 1 Tablet under the tongue as needed for Chest Pain for up to 30 days. DISSOLVE ONE TABLET UNDER THE TONGUE EVERY 5 MINUTES AS NEEDED FOR CHEST PAIN DO NOT EXCEED A TOTAL OF 3 DOSES IN 15 MINUTES 25 Tablet 0    ondansetron (ZOFRAN ODT) 4 MG TABLET DISPERSIBLE Take 1 Tablet by mouth every 8 hours as needed for Nausea/Vomiting. 12 Tablet 0       Current Facility-Administered Medications    Medication Dose Frequency Provider Last Rate Last Admin    aspirin (Asa) chewable tab 81 mg  81 mg DAILY Arnol Still M.D.   81 mg at 02/29/24 0542    heparin infusion 25,000 Units in 500 mL 0.45% NACL   Continuous Arnol Still M.D. 29 mL/hr at 02/29/24 0719 1,450 Units/hr at 02/29/24 0719    labetalol (Normodyne/Trandate) injection 10 mg  10 mg Q6HRS PRN Arnol Still M.D.   10 mg at 02/29/24 0338    nitroglycerin (Nitro-Bid) 2 % ointment 1 Inch  1 Inch Q6HRS Arnol Still M.D.   1 Inch at 02/29/24 0653    acetaminophen (Tylenol) tablet 650 mg  650 mg Q6HRS PRN Arnol Still M.D.        oxyCODONE immediate-release (Roxicodone) tablet 5 mg  5 mg Q4HRS PRN Arnol Still M.D.   5 mg at 02/29/24 0335    amLODIPine (Norvasc) tablet 10 mg  10 mg DAILY Arnol Still M.D.   10 mg at 02/29/24 0655    atorvastatin (Lipitor) tablet 80 mg  80 mg DAILY Arnol Still M.D.   80 mg at 02/29/24 0542    carvedilol (Coreg) tablet 12.5 mg  12.5 mg BID WITH MEALS Arnol Still M.D.        clopidogrel (Plavix) tablet 75 mg  75 mg DAILY Arnol Still M.D.   75 mg at 02/29/24 0542    isosorbide mononitrate SR (Imdur) tablet 30 mg  30 mg DAILY Arnol Still M.D.   30 mg at 02/29/24 0657    losartan (Cozaar) tablet 100 mg  100 mg DAILY Arnol Still M.D.   100 mg at 02/29/24 0656    traZODone (Desyrel) tablet 500 mg  500 mg Nightly Arnol Still M.D.   500 mg at 02/29/24 0232    diazePAM (Valium) tablet 5 mg  5 mg Q6HRS PRN Arnol Still M.D.   5 mg at 02/29/24 0202    omeprazole (PriLOSEC) capsule 40 mg  40 mg DAILY Arnol Still M.D.   40 mg at 02/29/24 0535   Last reviewed on 2/29/2024 12:36 AM by Clementina Jon     Gabapentin, Depakote [valproic acid], Ibuprofen, Lisinopril, and Topiramate    Family History   Problem Relation Age of Onset    Dementia Mother        ROS: As per HPI all other systems reviewed and negative     Physical Exam   Blood pressure 129/85, pulse 67, temperature 36.8 °C (98.2 °F), temperature source  "Temporal, resp. rate 19, height 1.727 m (5' 8\"), weight 101 kg (223 lb), SpO2 96%.    Constitutional:  Appears in NAD,  HENT: Normocephalic  Neck: No JVD present  Cardiovascular: Normal rate. Exam reveals no gallop and no friction rub. No murmur heard.   Pulmonary/Chest: CTAB   Musculoskeletal:  Pulses present. No atrophy. Strength normal.  Extremities: Exhibits no edema. No clubbing or cyanosis.   Skin: Skin is warm and dry.   Neuro: Non-focal, CN 2-12 intact grossly      Intake/Output Summary (Last 24 hours) at 2/29/2024 0743  Last data filed at 2/29/2024 0458  Gross per 24 hour   Intake --   Output 800 ml   Net -800 ml       Recent Labs     02/28/24 1953 02/29/24  0340   WBC 9.9 9.6   RBC 5.25 5.32   HEMOGLOBIN 16.1 16.4   HEMATOCRIT 45.7 46.7   MCV 87.0 87.8   MCH 30.7 30.8   MCHC 35.2 35.1   RDW 52.3* 52.1*   PLATELETCT 230 212   MPV 10.1 10.0     Recent Labs     02/28/24 2045 02/29/24  0340   SODIUM 142 139   POTASSIUM 4.4 4.2   CHLORIDE 107 105   CO2 22 21   GLUCOSE 101* 106*   BUN 20 18   CREATININE 1.34 1.14   CALCIUM 8.3* 8.2*     Recent Labs     02/28/24 1953 02/29/24  0622   APTT 48.9* 49.7*   INR 1.01  --                      Imaging reviewed    Impressions:  # Unstable angina, trop trending down since recent NSTEMI admission  # Concern for D1 disease as cause for his symptoms, got PTCA for it 2/25/24  # DL, smoker cutting down, history of PE on chronic AC, HTN, premature ASCVD    Recommendations:  - continue all cardiac home meds except Apixaban, on hep gtt  - CA/LHC and possible PCI to check on that diagonal branch mainly  - TTE  - further recs pending cath. NPO    Discussed risks, benefits, rationale, appropriateness and alternatives to coronary angiography with IV sedation in great detail with the patient.  Complications including but not limited to death, stroke, MI, urgent bypass surgery, contrast nephropathy, vascular complications, bleeding and infection were explained.  In addition, we " discussed that 10% of patients will experience small to moderate bruising at the side of the arterial puncture.  Risks of major complications such as heart attack or stroke caused by the angiogram is less than 1%; the risk of death is approximately 1 in 1000.  The potential outcomes associated with the procedure (possible PCI, possible CABG, possible medical Rx only) were also discussed at length.  Patient voices understanding and with shared decision making, is in agreement to proceed.     We will follow. Please contact me with any questions.     Discussed with the referring physician and bedside nursing.    Please note that this dictation was created using voice recognition software. There may be errors I did not discover before finalizing the note.     Jin Armstrong MD, MPH [facility]  Interventional Cardiologist  [facility]   of Clinical Internal Medicine - [facility] [place]

## 2024-02-29 NOTE — ED NOTES
Bedside report given to the next shift JACQUELINE Harrison for continuity of care and management.  Provided opportunity to asks questions.  Pt connected to monitor  All pt belongings at bedside    Contraptions: IV gauge 18 Rt AC/ gauge 20 Lt Hand  Alert and Oriented: x 4  Ambulatory: yes  Oxygen: 2 LPM  Pending: Admission    On heparin infusion  Next APTT check at 1322

## 2024-02-29 NOTE — ED NOTES
Triny from Lab called with critical result of Heparin Xa 1.08 at 2233. Critical lab result read back to Triny.   Dr. Ambrose  notified of critical lab result at 2235.  Critical lab result read back by Dr. Ambrose .

## 2024-02-29 NOTE — HOSPITAL COURSE
Divine Lugo is a 53 y.o. male who presented 2/28/2024 with past medical history of coronary artery disease with multiple stents, CVA, hypertension, history of PE, who presents to the hospital for chest pain that has been persistent since his previous admission.  The chest pain radiates to the left arm and left neck.  The chest pain is exertional.  Patient was discharged 2 days ago after a ACS event that led to a MAYRA placement in the proximal left LAD.  It is associated with nausea, lightheadedness, dizziness, orthopnea and exertional shortness of breath.  Patient states that he is compliant with his home medications and Lasix.     Chest x-ray interpreted by me found no acute pulmonary process  EKG interpreted by me found normal sinus rhythm, LVH, Q waves in inferior leads without ST segment changes

## 2024-02-29 NOTE — ED NOTES
Checked on bed, connected to monitor,  with unlabored respirations. Vital signs is stable.   Sleeping. No current needs identified.  Gurney in low position, side rail up for pt safety. Call light within reach.

## 2024-02-29 NOTE — PROGRESS NOTES
Patient with recent LAD stent with recurrent chest pain.  EKG-non specific ST changes. No stemi.  Per report compliant w meds.  Admit under NSTEMI  Try nitro paste.  Get troponins.  Continue heparin.

## 2024-02-29 NOTE — ED TRIAGE NOTES
"Chief Complaint   Patient presents with    Chest Pain     Pt woke up at 3 pm with chest pain  [place] [facility] [place]     Given Fentanyl 100 mcg IV en route  Pain: 8/10    Medications given from transferring facility:  - 1528: Nitroglycerin 0.4 mg SL  - 1534 Nitroglycerin 0.4 mg SL  - 1605:  Heparin 98157 unit - 5 units/kg/hr (Right AC)  - 1655: Morphine 2 mg IV    IV gauge 18 Rt AC/ gauge 20 Lt hand  AO x 4  Room Air      BP (!) 164/89   Pulse 80   Temp 36.8 °C (98.2 °F) (Temporal)   Resp 19   Ht 1.727 m (5' 8\")   Wt 101 kg (223 lb)   SpO2 94%     Past Medical History:   Diagnosis Date    Angina     Arthritis     Back fracture     T12 THRU LUMBER FRACTURED         Backpain     CAD (coronary artery disease)     mi    Cancer (HCC)     DVT     Fracture closed, pelvis     RIGHT    HTN (hypertension) 06/07/2012    Hypertension     Lipoma NOS     Myocardial infarct (HCC)     Nephrolithiasis 06/07/2012    Nonmalignant tumors     LUNG NECK SHOULDER    Personal history of venous thrombosis and embolism     PE, RLE DVT    Pneumonia     Pulmonary embolism (HCC)     Renal disorder     KIDNEY STONES    Stroke ([facility])     Stroke determined by clinical assessment ([facility]) 11/21/2023    Unspecified urinary incontinence PE     Past Surgical History:   Procedure Laterality Date    CYSTOSCOPY STENT PLACEMENT  6/3/2012    Performed by JAVIER MEDEIROS at SURGERY [facility] ORS    URETEROSCOPY  6/3/2012    Performed by JAVIER MEDEIROS at SURGERY [facility] ORS    LASERTRIPSY  6/3/2012    Performed by JAVIER MEDEIROS at SURGERY [facility] ORS    OTHER      TUMOR REMOVAL X5 ON BACK    OTHER CARDIAC SURGERY      cath here on 6/29    OTHER ORTHOPEDIC SURGERY      wrist, knee, back       "

## 2024-02-29 NOTE — ED PROVIDER NOTES
ED Provider Note    CHIEF COMPLAINT  Chief Complaint   Patient presents with    Chest Pain     Pt woke up at 3 pm with chest pain  [place] [facility] [place] a [place]       EXTERNAL RECORDS REVIEWED  External ED Note NSTEMI diagnosed at outside hospital with troponin 1100 and troponin I.  Patient with recent admission and cath on 2/25 with discharge on 2/26.  He did have an LAD stent placed with partial over lying a previous stent due to stenosis.    HPI/ROS  LIMITATION TO HISTORY   Select: : None  OUTSIDE HISTORIAN(S):  EMS heparin continued in route, no acute events with EMS    Divine Lugo is a 53 y.o. male who [place] [facility] [place].  Patient began havinPatient had a 6 cm right frontoparietal substernal chest pressure worse with exertion today.  He attempted to go about his day including errands.  He rates the chest pain as 9 out of 10 when it started.  He took a nap and then at 3 PM woke up continuing with left-sided chest pain.  He did not have radiation of pain to the arm or jaw, no diaphoresis, no dizziness or lightheadedness.  He went to outside hospital in [facility] and was diagnosed with an NSTEMI.  In transfer he had nitroglycerin x 2, had most recently received fentanyl 100 mcg.  He had taken aspirin at home after the chest pain started and he has been started on a heparin drip by outside hospital.  Here on arrival patient reports chest pain is 7 out of 10, again described as pressure.  Not noting any associated symptoms.  He denies shortness of breath, cough, fevers, lower extremity swelling.  He endorses compliance with his Plavix, was told he is not supposed take aspirin anymore, is also taking his blood thinner appropriately per patient report.    PAST MEDICAL HISTORY   has a past medical history of Angina, Arthritis, Back fracture, Backpain, CAD (coronary artery disease), Cancer (HCC), DVT, Fracture closed,  pelvis, HTN (hypertension) (06/07/2012), Hypertension, Lipoma NOS, Myocardial infarct (HCC), Nephrolithiasis (06/07/2012), Nonmalignant tumors, Personal history of venous thrombosis and embolism, Pneumonia, Pulmonary embolism (HCC), Renal disorder, Stroke (HCC), Stroke determined by clinical assessment (HCC) (11/21/2023), and Unspecified urinary incontinence (PE).    SURGICAL HISTORY   has a past surgical history that includes other orthopedic surgery; other; other cardiac surgery; cystoscopy stent placement (6/3/2012); ureteroscopy (6/3/2012); and lasertripsy (6/3/2012).    FAMILY HISTORY  Family History   Problem Relation Age of Onset    Dementia Mother        SOCIAL HISTORY  Social History     Tobacco Use    Smoking status: Every Day     Types: Cigarettes     Passive exposure: Current    Smokeless tobacco: Current     Types: Chew    Tobacco comments:      chews/1 can q 2 days, smoke 2  cigs per day   Vaping Use    Vaping Use: Never used   Substance and Sexual Activity    Alcohol use: No     Comment: 23 year ago quit    Drug use: Yes     Types: Marijuana     Comment: at bedtime    Sexual activity: Not on file       CURRENT MEDICATIONS  Home Medications       Reviewed by Juan Ramon Mims R.N. (Registered Nurse) on 02/28/24 at 1938  Med List Status: Partial     Medication Last Dose Status   acetaminophen (TYLENOL) 500 MG Tab  Active   amLODIPine (NORVASC) 10 MG Tab  Active   apixaban (ELIQUIS) 5mg Tab  Active   atorvastatin (LIPITOR) 80 MG tablet  Active   carvedilol (COREG) 12.5 MG Tab  Active   clopidogrel (PLAVIX) 75 MG Tab  Active   cyclobenzaprine (FLEXERIL) 10 mg Tab  Active   diphenhydrAMINE (BENADRYL ALLERGY) 25 MG capsule  Active   EPINEPHrine (EPIPEN) 0.3 MG/0.3ML Solution Auto-injector solution for injection  Active   furosemide (LASIX) 40 MG Tab  Active   isosorbide mononitrate SR (IMDUR) 30 MG TABLET SR 24 HR  Active   losartan (COZAAR) 100 MG Tab  Active   nitroglycerin (NITROSTAT) 0.4 MG SL Tab   "Active   NON SPECIFIED  Active   omeprazole (PRILOSEC) 40 MG delayed-release capsule  Active   ondansetron (ZOFRAN ODT) 4 MG TABLET DISPERSIBLE  Active   oxyCODONE-acetaminophen (PERCOCET) 5-325 MG Tab  Active   potassium chloride SA (KDUR) 20 MEQ Tab CR  Active   trazodone (DESYREL) 300 MG tablet  Active                    ALLERGIES  Allergies   Allergen Reactions    Gabapentin Unspecified     Causes patient to seizures    Depakote [Valproic Acid] Unspecified     Non epileptic seizure ( stated by patient )    Ibuprofen      Hx kidney disease, still takes at home     Lisinopril      Note: Built up in system and caused overdose. Body not able to process medication.    Topiramate      Note: Unknown.       PHYSICAL EXAM  VITAL SIGNS: BP (!) 173/87   Pulse 83   Temp 36.8 °C (98.2 °F) (Temporal)   Resp 20   Ht 1.727 m (5' 8\")   Wt 101 kg (223 lb)   SpO2 93%   BMI 33.91 kg/m²    Constitutional: Awake and alert. No acute distress.  Head: NCAT.  HEENT: Normal Conjunctiva. PERRLA.  Neck: Grossly normal range of motion. Airway midline.  Cardiovascular: Normal heart rate, Normal rhythm.  Thorax & Lungs: No respiratory distress. Clear to Auscultation bilaterally.  Abdomen: Normal inspection. Nontender. Nondistended  Skin: No obvious rash.  Back: No tenderness, No CVA tenderness.   Musculoskeletal: No obvious deformity. Moves all extremities Well.  Neurologic: A&Ox3.   Psychiatric: Mood and affect are appropriate for situation.      DIAGNOSTIC STUDIES / PROCEDURES  EKG  I have independently interpreted this EKG  82bpm. No STEMI.  There are TWI in lead V3.  LAFB.    LABS  Results for orders placed or performed during the hospital encounter of 02/28/24   CBC with Differential   Result Value Ref Range    WBC 9.9 4.8 - 10.8 K/uL    RBC 5.25 4.70 - 6.10 M/uL    Hemoglobin 16.1 14.0 - 18.0 g/dL    Hematocrit 45.7 42.0 - 52.0 %    MCV 87.0 81.4 - 97.8 fL    MCH 30.7 27.0 - 33.0 pg    MCHC 35.2 32.3 - 36.5 g/dL    RDW 52.3 (H) " 35.9 - 50.0 fL    Platelet Count 230 164 - 446 K/uL    MPV 10.1 9.0 - 12.9 fL    Neutrophils-Polys 60.50 44.00 - 72.00 %    Lymphocytes 27.10 22.00 - 41.00 %    Monocytes 8.90 0.00 - 13.40 %    Eosinophils 1.60 0.00 - 6.90 %    Basophils 0.40 0.00 - 1.80 %    Immature Granulocytes 1.50 (H) 0.00 - 0.90 %    Nucleated RBC 0.00 0.00 - 0.20 /100 WBC    Neutrophils (Absolute) 5.99 1.82 - 7.42 K/uL    Lymphs (Absolute) 2.69 1.00 - 4.80 K/uL    Monos (Absolute) 0.88 (H) 0.00 - 0.85 K/uL    Eos (Absolute) 0.16 0.00 - 0.51 K/uL    Baso (Absolute) 0.04 0.00 - 0.12 K/uL    Immature Granulocytes (abs) 0.15 (H) 0.00 - 0.11 K/uL    NRBC (Absolute) 0.00 K/uL   T.PALLIDUM AB KEYSHAWN (Syphilis)   Result Value Ref Range    Syphilis, Treponemal Qual Non-Reactive Non-Reactive   PROTHROMBIN TIME (INR)   Result Value Ref Range    PT 13.4 12.0 - 14.6 sec    INR 1.01 0.87 - 1.13   URINE DRUG SCREEN   Result Value Ref Range    Amphetamines Urine Negative Negative    Barbiturates Negative Negative    Benzodiazepines Negative Negative    Cocaine Metabolite Negative Negative    Fentanyl, Urine Positive (A) Negative    Methadone Negative Negative    Opiates Positive (A) Negative    Oxycodone Positive (A) Negative    Phencyclidine -Pcp Negative Negative    Propoxyphene Negative Negative    Cannabinoid Metab Positive (A) Negative   Heparin Anti-Xa   Result Value Ref Range    Heparin Xa (UFH) >1.10 (HH) IU/mL   APTT   Result Value Ref Range    APTT 48.9 (H) 24.7 - 36.0 sec   Comp Metabolic Panel   Result Value Ref Range    Sodium 142 135 - 145 mmol/L    Potassium 4.4 3.6 - 5.5 mmol/L    Chloride 107 96 - 112 mmol/L    Co2 22 20 - 33 mmol/L    Anion Gap 13.0 7.0 - 16.0    Glucose 101 (H) 65 - 99 mg/dL    Bun 20 8 - 22 mg/dL    Creatinine 1.34 0.50 - 1.40 mg/dL    Calcium 8.3 (L) 8.5 - 10.5 mg/dL    Correct Calcium 8.6 8.5 - 10.5 mg/dL    AST(SGOT) 63 (H) 12 - 45 U/L    ALT(SGPT) 96 (H) 2 - 50 U/L    Alkaline Phosphatase 103 (H) 30 - 99 U/L    Total  Bilirubin 0.5 0.1 - 1.5 mg/dL    Albumin 3.6 3.2 - 4.9 g/dL    Total Protein 6.5 6.0 - 8.2 g/dL    Globulin 2.9 1.9 - 3.5 g/dL    A-G Ratio 1.2 g/dL   TROPONIN   Result Value Ref Range    Troponin T 451 (H) 6 - 19 ng/L   ESTIMATED GFR   Result Value Ref Range    GFR (CKD-EPI) 63 >60 mL/min/1.73 m 2   Heparin Anti-Xa   Result Value Ref Range    Heparin Xa (UFH) 1.08 (HH) IU/mL   EKG   Result Value Ref Range    Report       [facility] Emergency Dept.    Test Date:  2024  Pt Name:    RAMSEY BRAND               Department: ER  MRN:        [id number]                      Room:        07  Gender:     Male                         Technician: [id number]  :        1970                   Requested By:ER TRIAGE PROTOCOL  Order #:    [id number]                    Reading MD:    Measurements  Intervals                                Axis  Rate:       82                           P:          43  WV:         141                          QRS:        -44  QRSD:       96                           T:          103  QT:         408  QTc:        477    Interpretive Statements  Sinus rhythm  Left anterior fascicular block  Anterior infarct, old  Nonspecific T abnormalities, lateral leads  Baseline wander in lead(s) V2  Compared to ECG 2024 23:27:03  T-wave abnormality now present  Left ventricular hypertrophy no longer present  Myocardial infarct finding still present           RADIOLOGY  I have independently interpreted the diagnostic imaging associated with this visit and am waiting the final reading from the radiologist.   My preliminary interpretation is as follows: no opacity, no effusion  Radiologist interpretation:   DX-CHEST-PORTABLE (1 VIEW)   Final Result         1. No acute cardiopulmonary abnormalities are identified.            COURSE & MEDICAL DECISION MAKING    ED Observation Status? No; Patient does not meet criteria for ED Observation.     INITIAL ASSESSMENT, COURSE AND PLAN  Care  Narrative:   53M 3 days s/p [facility] with LAD stent placement for stenosis of previous stent presents as transfer from [facility] for NSTEMI with chest pressure onset earlier today and found with troponin elevation.  Afebrile, hypertension  No murmur, clear lungs.  Comfortable appearing and reporting 7/10 chest pressure. No diaphoresis, no lower extremity edema  Will repeat labs here. EKG with TWI in precordial leads, no STEMI.  Cardiology consulted given recent LHC and stent placement just 3 days ago.  Recommends nitro paste, continued pain management and if unable to make chest pain free will consider repeat cath this evening.  Patient received above medications, heparin infusion was continued here.  Pain is improving. Discussed with cardiology further, repeat troponin (different assay) is 451.  Cardiology agrees with admission to telemetry, will evaluate in aM.  Hospitalist consulted for admission and accepted patient.    HTN/IDDM FOLLOW UP:  The patient has known hypertension and is being followed by their primary care doctor      ADDITIONAL PROBLEM LIST    Hypertension    DISPOSITION AND DISCUSSIONS  I have discussed management of the patient with the following physicians and MONIKA's:    Dr. Wharton - will follow in AM, no acute intervention  Dr. Still - accepted patient    Discussion of management with other Q or appropriate source(s): None     Barriers to care at this time, including but not limited to: Patient lacks transportation  and Patient lacks financial resources.     Decision tools and prescription drugs considered including, but not limited to: HEART Score 6 .    FINAL DIAGNOSIS  1. NSTEMI (non-ST elevated myocardial infarction) (HCC)    2. Primary hypertension           Electronically signed by: Johan Frost D.O., 2/28/2024 8:00 PM

## 2024-02-29 NOTE — ED NOTES
Notified to the hospitalist that pt blood pressure is becoming soft  He said to observe the blood pressure for 1 hour

## 2024-03-01 ENCOUNTER — APPOINTMENT (OUTPATIENT)
Dept: CARDIOLOGY | Facility: MEDICAL CENTER | Age: 54
DRG: 321 | End: 2024-03-01
Attending: INTERNAL MEDICINE
Payer: MEDICAID

## 2024-03-01 ENCOUNTER — PHARMACY VISIT (OUTPATIENT)
Dept: PHARMACY | Facility: MEDICAL CENTER | Age: 54
End: 2024-03-01
Payer: COMMERCIAL

## 2024-03-01 VITALS
RESPIRATION RATE: 18 BRPM | SYSTOLIC BLOOD PRESSURE: 134 MMHG | BODY MASS INDEX: 34.41 KG/M2 | DIASTOLIC BLOOD PRESSURE: 86 MMHG | HEART RATE: 76 BPM | HEIGHT: 68 IN | WEIGHT: 227.07 LBS | TEMPERATURE: 98.2 F | OXYGEN SATURATION: 93 %

## 2024-03-01 LAB
ANION GAP SERPL CALC-SCNC: 12 MMOL/L (ref 7–16)
BUN SERPL-MCNC: 18 MG/DL (ref 8–22)
CALCIUM SERPL-MCNC: 8.4 MG/DL (ref 8.5–10.5)
CHLORIDE SERPL-SCNC: 106 MMOL/L (ref 96–112)
CO2 SERPL-SCNC: 21 MMOL/L (ref 20–33)
CREAT SERPL-MCNC: 1.2 MG/DL (ref 0.5–1.4)
ERYTHROCYTE [DISTWIDTH] IN BLOOD BY AUTOMATED COUNT: 51.4 FL (ref 35.9–50)
GFR SERPLBLD CREATININE-BSD FMLA CKD-EPI: 72 ML/MIN/1.73 M 2
GLUCOSE SERPL-MCNC: 130 MG/DL (ref 65–99)
HCT VFR BLD AUTO: 45.8 % (ref 42–52)
HGB BLD-MCNC: 16.2 G/DL (ref 14–18)
LV EJECT FRACT  99904: 52
LV EJECT FRACT MOD 2C 99903: 52.29
LV EJECT FRACT MOD 4C 99902: 54.05
LV EJECT FRACT MOD BP 99901: 52.41
MCH RBC QN AUTO: 30.6 PG (ref 27–33)
MCHC RBC AUTO-ENTMCNC: 35.4 G/DL (ref 32.3–36.5)
MCV RBC AUTO: 86.4 FL (ref 81.4–97.8)
PLATELET # BLD AUTO: 233 K/UL (ref 164–446)
PMV BLD AUTO: 9.9 FL (ref 9–12.9)
POTASSIUM SERPL-SCNC: 4.2 MMOL/L (ref 3.6–5.5)
RBC # BLD AUTO: 5.3 M/UL (ref 4.7–6.1)
SODIUM SERPL-SCNC: 139 MMOL/L (ref 135–145)
WBC # BLD AUTO: 9.5 K/UL (ref 4.8–10.8)

## 2024-03-01 PROCEDURE — A9270 NON-COVERED ITEM OR SERVICE: HCPCS | Performed by: HOSPITALIST

## 2024-03-01 PROCEDURE — 80048 BASIC METABOLIC PNL TOTAL CA: CPT

## 2024-03-01 PROCEDURE — 700102 HCHG RX REV CODE 250 W/ 637 OVERRIDE(OP): Performed by: NURSE PRACTITIONER

## 2024-03-01 PROCEDURE — A9270 NON-COVERED ITEM OR SERVICE: HCPCS | Performed by: NURSE PRACTITIONER

## 2024-03-01 PROCEDURE — 93306 TTE W/DOPPLER COMPLETE: CPT | Mod: 26 | Performed by: INTERNAL MEDICINE

## 2024-03-01 PROCEDURE — 700102 HCHG RX REV CODE 250 W/ 637 OVERRIDE(OP): Performed by: HOSPITALIST

## 2024-03-01 PROCEDURE — 85027 COMPLETE CBC AUTOMATED: CPT

## 2024-03-01 PROCEDURE — 93931 UPPER EXTREMITY STUDY: CPT | Mod: 26,RT | Performed by: INTERNAL MEDICINE

## 2024-03-01 PROCEDURE — RXMED WILLOW AMBULATORY MEDICATION CHARGE: Performed by: STUDENT IN AN ORGANIZED HEALTH CARE EDUCATION/TRAINING PROGRAM

## 2024-03-01 PROCEDURE — 93306 TTE W/DOPPLER COMPLETE: CPT

## 2024-03-01 RX ORDER — OXYCODONE HYDROCHLORIDE AND ACETAMINOPHEN 5; 325 MG/1; MG/1
1 TABLET ORAL EVERY 6 HOURS PRN
Qty: 20 TABLET | Refills: 0 | Status: SHIPPED | OUTPATIENT
Start: 2024-03-01 | End: 2024-03-06

## 2024-03-01 RX ORDER — ASPIRIN 81 MG/1
81 TABLET, CHEWABLE ORAL DAILY
Qty: 7 TABLET | Refills: 0 | Status: SHIPPED
Start: 2024-03-02 | End: 2024-03-01

## 2024-03-01 RX ORDER — ASPIRIN 81 MG/1
81 TABLET, CHEWABLE ORAL DAILY
Qty: 7 TABLET | Refills: 0 | Status: SHIPPED | OUTPATIENT
Start: 2024-03-02

## 2024-03-01 RX ORDER — ATORVASTATIN CALCIUM 80 MG/1
80 TABLET, FILM COATED ORAL DAILY
Qty: 30 TABLET | Refills: 1 | Status: SHIPPED | OUTPATIENT
Start: 2024-03-01

## 2024-03-01 RX ORDER — AMLODIPINE BESYLATE 10 MG/1
10 TABLET ORAL DAILY
Qty: 30 TABLET | Refills: 1 | Status: SHIPPED | OUTPATIENT
Start: 2024-03-01

## 2024-03-01 RX ORDER — CLOPIDOGREL BISULFATE 75 MG/1
75 TABLET ORAL DAILY
Qty: 90 TABLET | Refills: 1 | Status: SHIPPED | OUTPATIENT
Start: 2024-03-01

## 2024-03-01 RX ORDER — ISOSORBIDE MONONITRATE 30 MG/1
30 TABLET, EXTENDED RELEASE ORAL DAILY
Qty: 30 TABLET | Refills: 0 | Status: SHIPPED | OUTPATIENT
Start: 2024-03-01

## 2024-03-01 RX ORDER — CARVEDILOL 12.5 MG/1
12.5 TABLET ORAL 2 TIMES DAILY
Qty: 60 TABLET | Refills: 1 | Status: SHIPPED | OUTPATIENT
Start: 2024-03-01

## 2024-03-01 RX ORDER — LOSARTAN POTASSIUM 100 MG/1
100 TABLET ORAL DAILY
Qty: 30 TABLET | Refills: 1 | Status: SHIPPED | OUTPATIENT
Start: 2024-03-01

## 2024-03-01 RX ADMIN — APIXABAN 5 MG: 5 TABLET, FILM COATED ORAL at 08:29

## 2024-03-01 RX ADMIN — OMEPRAZOLE 40 MG: 20 CAPSULE, DELAYED RELEASE ORAL at 06:30

## 2024-03-01 RX ADMIN — AMLODIPINE BESYLATE 10 MG: 10 TABLET ORAL at 06:31

## 2024-03-01 RX ADMIN — LOSARTAN POTASSIUM 100 MG: 50 TABLET, FILM COATED ORAL at 06:31

## 2024-03-01 RX ADMIN — DIAZEPAM 5 MG: 5 TABLET ORAL at 08:32

## 2024-03-01 RX ADMIN — CLOPIDOGREL BISULFATE 75 MG: 75 TABLET ORAL at 06:31

## 2024-03-01 RX ADMIN — ISOSORBIDE MONONITRATE 30 MG: 30 TABLET, EXTENDED RELEASE ORAL at 06:31

## 2024-03-01 RX ADMIN — OXYCODONE 5 MG: 5 TABLET ORAL at 06:40

## 2024-03-01 RX ADMIN — ATORVASTATIN CALCIUM 80 MG: 80 TABLET, FILM COATED ORAL at 06:31

## 2024-03-01 RX ADMIN — NITROGLYCERIN 1 INCH: 20 OINTMENT TOPICAL at 02:00

## 2024-03-01 RX ADMIN — CARVEDILOL 12.5 MG: 12.5 TABLET, FILM COATED ORAL at 08:29

## 2024-03-01 RX ADMIN — ASPIRIN 81 MG 81 MG: 81 TABLET ORAL at 06:31

## 2024-03-01 ASSESSMENT — ENCOUNTER SYMPTOMS
APNEA: 0
COUGH: 0
CHEST TIGHTNESS: 0
WHEEZING: 0
STRIDOR: 0
SHORTNESS OF BREATH: 0
CHOKING: 0

## 2024-03-01 ASSESSMENT — FIBROSIS 4 INDEX: FIB4 SCORE: 1.09

## 2024-03-01 ASSESSMENT — PAIN DESCRIPTION - PAIN TYPE: TYPE: ACUTE PAIN

## 2024-03-01 NOTE — PROGRESS NOTES
Cardiology Follow Up Progress Note    Date of Service  3/1/2024    Attending Physician  Sylvester Painter M.D.    Chief Complaint   NSTEMI Trop ~400      HPI  Divine Lugo is a 53 y.o. male admitted 2/28/2024 with recurrent chest pain.  Patient was transferred from [facility] for further evaluation of NSTEMI.    PMH: CAD status post multiple stents, prior CVA, hypertension, history of PE on Eliquis, tobacco use.      Interim Events    No overnight cardiac events  Telemetry-SR  Underwent successful PCI proximal first diagonal yesterday.  Patent recently placed stent to proximal and mid LAD artery.  SBP ~130's  Labs reviewed  CBC & BMP in good order  Stable for discharge when echo is completed.    Review of Systems  Review of Systems   Respiratory:  Negative for apnea, cough, choking, chest tightness, shortness of breath, wheezing and stridor.        Vital signs in last 24 hours  Temp:  [36.5 °C (97.7 °F)-37.2 °C (99 °F)] 36.8 °C (98.2 °F)  Pulse:  [68-86] 76  Resp:  [16-24] 18  BP: (112-182)/() 134/86  SpO2:  [90 %-98 %] 93 %    Physical Exam  Physical Exam  Cardiovascular:      Rate and Rhythm: Normal rate and regular rhythm.      Pulses: Normal pulses.   Pulmonary:      Effort: Pulmonary effort is normal.   Skin:     General: Skin is warm.      Comments: Right radial cath site with mild bruising and swelling, stable.   Neurological:      Mental Status: He is alert. Mental status is at baseline.   Psychiatric:         Mood and Affect: Mood normal.         Lab Review  Lab Results   Component Value Date/Time    WBC 9.5 03/01/2024 12:34 AM    RBC 5.30 03/01/2024 12:34 AM    HEMOGLOBIN 16.2 03/01/2024 12:34 AM    HEMATOCRIT 45.8 03/01/2024 12:34 AM    MCV 86.4 03/01/2024 12:34 AM    MCH 30.6 03/01/2024 12:34 AM    MCHC 35.4 03/01/2024 12:34 AM    MPV 9.9 03/01/2024 12:34 AM      Lab Results   Component Value Date/Time    SODIUM 139 03/01/2024 12:34 AM    POTASSIUM 4.2 03/01/2024 12:34 AM     "CHLORIDE 106 2024 12:34 AM    CO2 21 2024 12:34 AM    GLUCOSE 130 (H) 2024 12:34 AM    BUN 18 2024 12:34 AM    CREATININE 1.20 2024 12:34 AM    GLOMRATE 49 (L) 2023 02:57 PM      Lab Results   Component Value Date/Time    ASTSGOT 43 2024 03:40 AM    ALTSGPT 84 (H) 2024 03:40 AM     Lab Results   Component Value Date/Time    CHOLSTRLTOT 207 (H) 2024 01:06 AM     (H) 2024 01:06 AM    HDL 45 2024 01:06 AM    TRIGLYCERIDE 243 (H) 2024 01:06 AM    TROPONINT 393 (H) 2024 03:40 AM       No results for input(s): \"NTPROBNP\" in the last 72 hours.    Cardiac Imaging and Procedures Review  EK24  Sinus rhythm   Probable left atrial enlargement   Left anterior fascicular block   Anteroseptal infarct, old   Nonspecific T abnormalities, lateral leads         Echocardiogram:  23  The left ventricle is normal in size. Mild concentric left ventricular   hypertrophyNormal left ventricular systolic function. The ejection   fraction is measured to be 55% by Irwin's biplane. No regional wall   motion abnormalities. Indeterminate diastolic function.       Cardiac Catheterization:    PROCEDURES PERFORMED:  Left heart catheterization  Coronary angiography  Intravascular ultrasound of the proximal left anterior descending coronary into the first diagonal branch  Bifurcation percutaneous coronary intervention from the proximal left anterior descending coronary into the proximal first diagonal branch using a culotte technique  Moderate conscious sedation    IMPRESSION:  Severe obstructive one-vessel coronary artery disease involving a large first diagonal branch.  Successful bifurcation percutaneous coronary intervention from the proximal left anterior descending coronary into the proximal first diagonal branch using a culotte technique and a 3.0 x 38 mm Synergy drug-eluting stent postdilated to 3.6 mm proximally  Patent recently placed " proximal to mid left anterior descending coronary artery stent.  Normal left heart filling pressures.         Imaging  Chest X-Ray:     No acute cardiopulmonary abnormalities are identified.       Assessment/Plan    # NSTEMI  # Prior PCI's most recent PCI proximal LAD &PTCA first diagonal, 2/25/2024.  # Prior CVA  # Tobacco use  # Prior PE, on Eliquis  # Hypertension    -Underwent successful PCI proximal first diagonal branch 2/29/2024.  -Resume Eliquis 5 mg twice daily.  -Continue DAPT with aspirin 81, Plavix 75 mg, along with carvedilol 12.5 twice daily and high intensity statin.  -Aspirin x 7 days then continue with Eliquis & Plavix only.  -Referral to cardiac rehab, will arrange.  -Discussed tobacco cessation.  -Close follow-up in cardiology office scheduled for 3/25/2024 at 1:15 PM.    No further cardiac workup.  Echocardiogram pending.  Stable for discharge when echo is completed.    I personally spent a total of 15 minutes which includes face-to-face time and non-face-to-face time spent on preparing to see the patient, reviewing hospital notes and tests, obtaining history from the patient, performing a medically appropriate exam, counseling and educating the patient, ordering medications/tests/procedures/referrals as clinically indicated, and documenting information in the electronic medical record.         Thank you for allowing me to participate in the care of this patient.  I will continue to follow this patient    Please contact me with any questions.    HEIDE Valle.   Cardiologist, [facility]  [phone]

## 2024-03-01 NOTE — PROGRESS NOTES
Cardiology update:    Notified by patient's nurse that BP is markedly elevated with SBP greater than 180 mmHg.  In addition, patient developed right arm pain following his coronary angiogram.  I personally examined the patient at bedside.  He appears comfortable but complaining of right arm pain.  His extremity is warm to touch.  Arterial access site covered with TR band but no evidence of hematoma or bleeding.  Cap refill is normal.  Pulse ox pleth is normal and palpable pulse.  Given pain following procedure, we will check a stat right upper extremity arterial ultrasound for further evaluation.  Needs better blood pressure control.  Will give labetalol 10 mg IV x 1 and increase home dose of carvedilol accordingly. Tylenol as needed for pain.    Electronically signed: David Magaña MD  Cardiologist, [facility]    Please note that this dictation was created using voice recognition software. There are errors of grammar and possibly content I did not discover before finalizing the note.     2/29/2024  7:52 PM

## 2024-03-01 NOTE — DISCHARGE PLANNING
Meds-to-Beds: Discharge prescription orders listed below delivered to patient in discharge lounge. RN notified. Patient counseled. Patient elected to have co-payment billed to patient account (When billing system back up).     Reviewed signs/symptoms of bleeding and when to seek medical attention. Reviewed potential drug-drug interactions.    Current Outpatient Medications   Medication Sig Dispense Refill    apixaban (ELIQUIS) 5mg Tab Take 1 Tablet by mouth 2 times a day. 60 Tablet 1    amLODIPine (NORVASC) 10 MG Tab Take 1 Tablet by mouth every day. 30 Tablet 1    atorvastatin (LIPITOR) 80 MG tablet Take 1 Tablet by mouth every day. 30 Tablet 1    carvedilol (COREG) 12.5 MG Tab Take 1 Tablet by mouth 2 times a day. 60 Tablet 1    clopidogrel (PLAVIX) 75 MG Tab Take 1 Tablet by mouth every day. 90 Tablet 1    isosorbide mononitrate SR (IMDUR) 30 MG TABLET SR 24 HR Take 1 Tablet by mouth every day. 30 Tablet 0    losartan (COZAAR) 100 MG Tab Take 1 Tablet by mouth every day. 30 Tablet 1    [START ON 3/2/2024] aspirin (ASA) 81 MG Chew Tab chewable tablet Chew 1 Tablet every day. 7 Tablet 0    oxyCODONE-acetaminophen (PERCOCET) 5-325 MG Tab Take 1 Tablet by mouth every 6 hours as needed for Severe Pain for up to 5 days. 20 Tablet 0      Cheryl Singh, PharmD

## 2024-03-01 NOTE — PROCEDURES
Cardiac Catheterization Procedure Note    DATE: 2/29/2024    : Ken Zarate MD    PROCEDURES PERFORMED:  Left heart catheterization  Coronary angiography  Intravascular ultrasound of the proximal left anterior descending coronary into the first diagonal branch  Bifurcation percutaneous coronary intervention from the proximal left anterior descending coronary into the proximal first diagonal branch using a culotte technique  Moderate conscious sedation    INDICATIONS:  The patient is a 53-year-old gentleman referred for repeat cardiac catheterization to evaluate persistent chest discomfort with an elevated troponin following recent percutaneous coronary intervention to the proximal to mid left anterior descending coronary and percutaneous transluminal coronary angioplasty of the proximal first diagonal branch.    CONSENT:  The complete alternatives, risks, and benefits of the procedure were explained to the patient. Signed informed consent was obtained and placed in the chart prior to the procedure.  A timeout was performed prior to beginning the procedure.    MEDICATIONS:  Lidocaine  Fentanyl  Midazolam  Nitroglycerin  Verapamil  Heparin    MODERATE CONSCIOUS SEDATION:  I personally supervised the administration of moderate conscious sedation by the nursing staff for 63 minutes.  Sedation start time: 2:57 PM  Sedation end time: 4:00 PM    CONTRAST: Omnipaque 94 cc    ACCESS: 6-Gabonese Glidesheath in the right radial artery.    ESTIMATED BLOOD LOSS: 20 cc    COMPLICATIONS: None    PROCEDURE IN DETAIL:  The patient was brought to the cardiac catheterization laboratory in the fasting state.  The skin over the right wrist was prepped and draped in the usual sterile fashion. Lidocaine infiltration was used to anesthetize the tissue over the right radial artery.  Using the micropuncture technique, a 6-Gabonese Glidesheath was inserted in the right radial artery.  A 5-Gabonese Luke diagnostic catheter was then  advanced over a standard J-wire into the left ventricular cavity where it was gently aspirated, flushed, and then withdrawn across the aortic valve with sequential pressures measured.  This catheter was then used to engage the ostium of the left main coronary artery and cineangiograms were obtained in multiple projections for complete evaluation of the left coronary system.  This catheter was then used to engage the ostium of the right coronary artery and and cineangiograms were obtained in multiple projections for complete evaluation of the right coronary system.  Following completion of coronary angiography, we proceeded with intravascular ultrasound.    The 6-Moldovan sheath was exchanged for a 7-Moldovan Glidesheath and then a Prowater wire was advanced into the distal aspect of the first diagonal branch and a Run-through wire was advanced into the distal aspect of the left anterior descending coronary.  An [place] IVUS catheter was then advanced over the diagonal wire, but could not cross the stent struts into the vessel.  Therefore, a 3.0 x 12 mm compliant balloon was used to further open the stent struts, which allowed the IVUS catheter past.  IVUS demonstrated that the mid aspect of the diagonal branch was relatively healthy with true vessel diameter of approximately 3.2 mm.  The LAD prior to the takeoff of the first diagonal branch had eccentric calcific plaque with a luminal diameter of approximately 4.0 mm.  Following intravascular ultrasound, we proceeded with percutaneous coronary intervention of the diagonal branch.    The 3.0 x 12 mm compliant balloon was used to predilate the vessel and then a 3.0 x 38 mm Synergy drug-eluting stent was was deployed from the proximal LAD extending into the proximal diagonal branch after pulling back the Run-through wire.  After stent deployment, the Run-through wire was readvanced through the stent struts into the distal LAD and then the struts were opened with the 3.0  mm compliant balloon.  The IVUS catheter was then readvanced into the diagonal branch, which demonstrated that the stent had landed in the appropriate zone distally and was mildly underexpanded in the mid to proximal segments.  The stent was then further postdilated with a 3.5 x 20 mm noncompliant balloon up to maximum pressure of 20 souleymane proximally.  A 4.0 x 15 mm noncompliant balloon was then advanced into the LAD and the stent struts were further postdilated at 16 souleymane.  Kissing balloon inflation was then performed with the 3.5 x 20 mm noncompliant balloon in the diagonal branch and the 4.0 x 15 mm noncompliant balloon in the LAD at a maximum pressure of 12 souleymane and 8 souleymane respectively.  The 4.0 mm balloon was then removed and the 3.5 mm balloon was used to further post dilate the mid and proximal segments of the diagonal branch up to a maximum pressure of 22 souleymane.  Final cineangiograms were then obtained in orthogonal views, which demonstrated excellent stent expansion and apposition with no evidence of wire perforation, thrombus or dissection. At the completion of the case, all wires, catheters, and sheaths were removed. A TR band was placed using the patent hemostasis technique.    HEMODYNAMICS:   Aortic pressure: 131/83 mmHg  Pre A-wave pressure: 12 mmHg  No significant aortic gradient on pullback    CORONARY ANGIOGRAPHY:  The left main coronary artery is patent and bifurcates into the left anterior descending and left circumflex coronary arteries.  The left anterior descending coronary artery has ostial proximal 20% disease and then a widely patent proximal stent followed by a mildly underexpanded mid vessel stent and distal luminal irregularities.  It supplies a large diagonal branch that supplies 3 limbs and has ostial 30% disease, proximal 90% disease after the first bifurcation, and 90% proximal disease in a small and insignificant lower limb past the second bifurcation.  The left circumflex coronary artery  is a moderate, nondominant vessel that supplies one significant obtuse marginal branch and has luminal irregularities in the distribution.  The right coronary artery is a large, dominant vessel with mid 30% disease and otherwise luminal irregularities.  Distally, it bifurcates into a large posterior descending coronary and a large posterolateral branch with luminal irregularities.    INTRAVASCULAR ULTRASOUND:  See IVUS findings and procedure details above.    PERCUTANEOUS CORONARY INTERVENTION:  Lesion location: Proximal first diagonal branch extending back into the main LAD bifurcation  Lesion type: C  Lesion length: 30 mm  Pre-intervention ROMI flow: 3  Post-intervention ROMI flow: 3  Pre-intervention stenosis: 90%  Post-intervention stenosis: 0%  Stent: 3.0 x 38 mm Synergy drug-eluting stent postdilated to 3.6 millimeter proximally    IMPRESSION:  Severe obstructive one-vessel coronary artery disease involving a large first diagonal branch.  Successful bifurcation percutaneous coronary intervention from the proximal left anterior descending coronary into the proximal first diagonal branch using a culotte technique and a 3.0 x 38 mm Synergy drug-eluting stent postdilated to 3.6 mm proximally  Patent recently placed proximal to mid left anterior descending coronary artery stent.  Normal left heart filling pressures.    RECOMMENDATIONS:  Return to floor with continued care per primary service.  TR band release per protocol.  Post procedure fluids for renal protection.  Continue dual antiplatelet therapy.  Continue optimal medical management of cardiovascular risk factors.    NOTIFICATION:  The patient's family was called and notified of the results of his cardiac catheterization.

## 2024-03-01 NOTE — CARE PLAN
The patient is Stable - Low risk of patient condition declining or worsening    Shift Goals  Clinical Goals: Hemodynamic stability, ultrasound  Patient Goals: Manage pain  Family Goals: JOE      Problem: Pain - Standard  Goal: Alleviation of pain or a reduction in pain to the patient’s comfort goal  Outcome: Progressing     Problem: Knowledge Deficit - Standard  Goal: Patient and family/care givers will demonstrate understanding of plan of care, disease process/condition, diagnostic tests and medications  Outcome: Progressing

## 2024-03-01 NOTE — PROGRESS NOTES
Telemetry Shift Summary      Rhythm: SR  HR: 88  Ectopy: none     Measurements: .13/.08/.48

## 2024-03-01 NOTE — DISCHARGE INSTRUCTIONS
Diagnosis:  Acute Coronary Syndrome (ACS) is a diagnosis that encompasses cardiac-related chest pain and heart attack. ACS occurs when the blood flow to the heart muscle is severely reduced or cut off completely due to a slow process called atherosclerosis.  Atherosclerosis is a disease in which the coronary arteries become narrow from a buildup of fat, cholesterol, and other substances that combine to form plaque. If the plaque breaks, a blood clot will form and block the blood flow to the heart muscle. This lack of blood flow can cause damage or death to the heart muscle which is called a heart attack or Myocardial Infarction (MI). There are two different types of MIs:  ST Elevation Myocardial Infarction or STEMI (the most severe type of heart attack) and Non-ST Elevation Myocardial Infarction or NSTEMI.    Treatment Plan:  Cardiac Diet  - Low fat, low salt, low cholesterol   Cardiac Rehab  - Your doctor has ordered you a referral to [facility].  Call [phone] to schedule an appointment.  Attend my follow-up appointment with my Cardiologist.  Take my medications as prescribed by my doctor  Exercise daily  Lower my bad cholesterol and raise my good cholesterol, lower my blood pressure, and Reduce stress    Medications:  Certain medications are used to treat ACS.  Remember to always take medications as prescribed and never stop talking medications unless told by your doctor.    You have been prescribed the following medicatons:    Aspirin - Aspirin is used as a blood thinning medication and you will require this medication indefinitely.  Anti-platelet/blood thinner - Your Anti-platelet/Blood thinning medication is called eliquis, and is used in combination with aspirin to prevent clots from forming in your heart and/or around your stent.  Your doctor will determine how long you need to be on this medicine.  Beta-Blocker - Beta-Blocker carvedilol is used to lower blood pressure and heart rate, and/or helps your  heart heal after a heart attack.  Statin - Statin atorvastatin is used to lower cholesterol.  Angiotensin Receptor Blocker (ARB) - Angiotensin Receptor Blocker losartan is used to lower blood pressure and treat heart failure.  Nitroglycerine - Nitroglycerine is used to relieve chest pain.  HF Patient Discharge Instructions  Monitor your weight daily, and maintain a weight chart, to track your weight changes.   Activity as tolerated, unless your Doctor has ordered otherwise. Other activity order: as tolerated.  Follow a low fat, low cholesterol, low salt diet unless instructed otherwise by your Doctor. Read the labels on the back of food products and track your intake of fat, cholesterol and salt.   Fluid Restriction No. If a Fluid Restriction has been ordered by your Doctor, measure fluids with a measuring cup to ensure that you are not exceeding the restriction.   No smoking.  Oxygen No. If your Doctor has ordered that you wear Oxygen at home, it is important to wear it as ordered.  Did you receive an explanation from staff on the importance of taking each of your medications and why it is necessary to keep taking them unless your doctor says to stop? Yes  Were all of your questions answered about how to manage your heart failure and what to do if you have increased signs and symptoms after you go home? Yes  Do you feel like your heart failure care team involved you in the care treatment plan and allowed you to make decisions regarding your care while in the hospital and addressed any discharge needs you might have? Yes    See the educational handout provided at discharge for more information on monitoring your daily weight, activity and diet. This also explains more about Heart Failure, symptoms of a flare-up and some of the tests that you have undergone.     Warning Signs of a Flare-Up include:  Swelling in the ankles or lower legs.  Shortness of breath, while at rest, or while doing normal activities.    Shortness of breath at night when in bed, or coughing in bed.   Requiring more pillows to sleep at night, or needing to sit up at night to sleep.  Feeling weak, dizzy or fatigued.     When to call your Doctor:  Call your Primary Care Physician or Cardiologist if:   You experience any pain radiating to your jaw or neck.  You have any difficulty breathing.  You experience weight gain of 3 lbs in a day or 5 lbs in a week.   You feel any palpitations or irregular heartbeats.  You become dizzy or lose consciousness.   If you have had an angiogram or had a pacemaker or AICD placed, and experience:  Bleeding, drainage or swelling at the surgical / puncture site.  Fever greater than 100.0 F  Shock from internal defibrillator.  Cool and / or numb extremities.     Please access the [facility] My HF Guide/Heart Failure Interactive Workbook:   [email or web address]    rtension     -Underwent successful PCI proximal first diagonal branch 2/29/2024.  -Resume Eliquis 5 mg twice daily.  -Continue DAPT with aspirin 81, Plavix 75 mg, along with carvedilol 12.5 twice daily and high intensity statin.  -Aspirin x 7 days then continue with Eliquis & Plavix only.  -Referral to cardiac rehab, will arrange.  -Discussed tobacco cessation.  -Close follow-up in cardiology office scheduled for 3/25/2024 at 1:15 PM.

## 2024-03-18 ENCOUNTER — APPOINTMENT (OUTPATIENT)
Dept: RADIOLOGY | Facility: MEDICAL CENTER | Age: 54
End: 2024-03-18
Attending: STUDENT IN AN ORGANIZED HEALTH CARE EDUCATION/TRAINING PROGRAM
Payer: MEDICAID

## 2024-03-18 ENCOUNTER — HOSPITAL ENCOUNTER (OUTPATIENT)
Facility: MEDICAL CENTER | Age: 54
End: 2024-03-18
Attending: STUDENT IN AN ORGANIZED HEALTH CARE EDUCATION/TRAINING PROGRAM | Admitting: STUDENT IN AN ORGANIZED HEALTH CARE EDUCATION/TRAINING PROGRAM
Payer: MEDICAID

## 2024-03-18 VITALS
BODY MASS INDEX: 35.65 KG/M2 | SYSTOLIC BLOOD PRESSURE: 151 MMHG | DIASTOLIC BLOOD PRESSURE: 86 MMHG | HEIGHT: 68 IN | OXYGEN SATURATION: 94 % | HEART RATE: 71 BPM | RESPIRATION RATE: 18 BRPM | WEIGHT: 235.23 LBS | TEMPERATURE: 98.7 F

## 2024-03-18 PROBLEM — R07.9 CHEST PAIN, RULE OUT ACUTE MYOCARDIAL INFARCTION: Status: RESOLVED | Noted: 2024-03-18 | Resolved: 2024-03-18

## 2024-03-18 PROBLEM — R07.9 CHEST PAIN, RULE OUT ACUTE MYOCARDIAL INFARCTION: Status: ACTIVE | Noted: 2024-03-18

## 2024-03-18 LAB
ALBUMIN SERPL BCP-MCNC: 4 G/DL (ref 3.2–4.9)
ALBUMIN/GLOB SERPL: 1.5 G/DL
ALP SERPL-CCNC: 116 U/L (ref 30–99)
ALT SERPL-CCNC: 21 U/L (ref 2–50)
ANION GAP SERPL CALC-SCNC: 11 MMOL/L (ref 7–16)
AST SERPL-CCNC: 19 U/L (ref 12–45)
BASOPHILS # BLD AUTO: 0.5 % (ref 0–1.8)
BASOPHILS # BLD: 0.05 K/UL (ref 0–0.12)
BILIRUB SERPL-MCNC: 0.5 MG/DL (ref 0.1–1.5)
BUN SERPL-MCNC: 16 MG/DL (ref 8–22)
CALCIUM ALBUM COR SERPL-MCNC: 8.5 MG/DL (ref 8.5–10.5)
CALCIUM SERPL-MCNC: 8.5 MG/DL (ref 8.5–10.5)
CHLORIDE SERPL-SCNC: 106 MMOL/L (ref 96–112)
CO2 SERPL-SCNC: 24 MMOL/L (ref 20–33)
CREAT SERPL-MCNC: 1.16 MG/DL (ref 0.5–1.4)
EKG IMPRESSION: NORMAL
EOSINOPHIL # BLD AUTO: 0.21 K/UL (ref 0–0.51)
EOSINOPHIL NFR BLD: 2.1 % (ref 0–6.9)
ERYTHROCYTE [DISTWIDTH] IN BLOOD BY AUTOMATED COUNT: 51.1 FL (ref 35.9–50)
GFR SERPLBLD CREATININE-BSD FMLA CKD-EPI: 75 ML/MIN/1.73 M 2
GLOBULIN SER CALC-MCNC: 2.6 G/DL (ref 1.9–3.5)
GLUCOSE SERPL-MCNC: 110 MG/DL (ref 65–99)
HCT VFR BLD AUTO: 44.2 % (ref 42–52)
HGB BLD-MCNC: 15.1 G/DL (ref 14–18)
IMM GRANULOCYTES # BLD AUTO: 0.12 K/UL (ref 0–0.11)
IMM GRANULOCYTES NFR BLD AUTO: 1.2 % (ref 0–0.9)
LYMPHOCYTES # BLD AUTO: 2.65 K/UL (ref 1–4.8)
LYMPHOCYTES NFR BLD: 27.1 % (ref 22–41)
MAGNESIUM SERPL-MCNC: 2.4 MG/DL (ref 1.5–2.5)
MCH RBC QN AUTO: 30.5 PG (ref 27–33)
MCHC RBC AUTO-ENTMCNC: 34.2 G/DL (ref 32.3–36.5)
MCV RBC AUTO: 89.3 FL (ref 81.4–97.8)
MONOCYTES # BLD AUTO: 1.14 K/UL (ref 0–0.85)
MONOCYTES NFR BLD AUTO: 11.7 % (ref 0–13.4)
NEUTROPHILS # BLD AUTO: 5.6 K/UL (ref 1.82–7.42)
NEUTROPHILS NFR BLD: 57.4 % (ref 44–72)
NRBC # BLD AUTO: 0 K/UL
NRBC BLD-RTO: 0 /100 WBC (ref 0–0.2)
PHOSPHATE SERPL-MCNC: 2.9 MG/DL (ref 2.5–4.5)
PLATELET # BLD AUTO: 218 K/UL (ref 164–446)
PMV BLD AUTO: 9.8 FL (ref 9–12.9)
POTASSIUM SERPL-SCNC: 4.1 MMOL/L (ref 3.6–5.5)
PROT SERPL-MCNC: 6.6 G/DL (ref 6–8.2)
RBC # BLD AUTO: 4.95 M/UL (ref 4.7–6.1)
SODIUM SERPL-SCNC: 141 MMOL/L (ref 135–145)
TROPONIN T SERPL-MCNC: 33 NG/L (ref 6–19)
TROPONIN T SERPL-MCNC: 35 NG/L (ref 6–19)
TROPONIN T SERPL-MCNC: 35 NG/L (ref 6–19)
WBC # BLD AUTO: 9.8 K/UL (ref 4.8–10.8)

## 2024-03-18 PROCEDURE — 700102 HCHG RX REV CODE 250 W/ 637 OVERRIDE(OP): Mod: UD | Performed by: STUDENT IN AN ORGANIZED HEALTH CARE EDUCATION/TRAINING PROGRAM

## 2024-03-18 PROCEDURE — 36415 COLL VENOUS BLD VENIPUNCTURE: CPT

## 2024-03-18 PROCEDURE — 84484 ASSAY OF TROPONIN QUANT: CPT

## 2024-03-18 PROCEDURE — 85025 COMPLETE CBC W/AUTO DIFF WBC: CPT

## 2024-03-18 PROCEDURE — 83735 ASSAY OF MAGNESIUM: CPT

## 2024-03-18 PROCEDURE — 93010 ELECTROCARDIOGRAM REPORT: CPT | Mod: 77 | Performed by: INTERNAL MEDICINE

## 2024-03-18 PROCEDURE — 80053 COMPREHEN METABOLIC PANEL: CPT

## 2024-03-18 PROCEDURE — 93010 ELECTROCARDIOGRAM REPORT: CPT | Performed by: INTERNAL MEDICINE

## 2024-03-18 PROCEDURE — 93005 ELECTROCARDIOGRAM TRACING: CPT | Performed by: NURSE PRACTITIONER

## 2024-03-18 PROCEDURE — 93005 ELECTROCARDIOGRAM TRACING: CPT | Mod: XE,76 | Performed by: STUDENT IN AN ORGANIZED HEALTH CARE EDUCATION/TRAINING PROGRAM

## 2024-03-18 PROCEDURE — A9270 NON-COVERED ITEM OR SERVICE: HCPCS | Mod: UD | Performed by: STUDENT IN AN ORGANIZED HEALTH CARE EDUCATION/TRAINING PROGRAM

## 2024-03-18 PROCEDURE — 700111 HCHG RX REV CODE 636 W/ 250 OVERRIDE (IP): Mod: JZ,UD | Performed by: INTERNAL MEDICINE

## 2024-03-18 PROCEDURE — G0378 HOSPITAL OBSERVATION PER HR: HCPCS

## 2024-03-18 PROCEDURE — 84100 ASSAY OF PHOSPHORUS: CPT

## 2024-03-18 PROCEDURE — 96375 TX/PRO/DX INJ NEW DRUG ADDON: CPT | Mod: XU

## 2024-03-18 PROCEDURE — 700111 HCHG RX REV CODE 636 W/ 250 OVERRIDE (IP): Mod: UD | Performed by: STUDENT IN AN ORGANIZED HEALTH CARE EDUCATION/TRAINING PROGRAM

## 2024-03-18 PROCEDURE — 99406 BEHAV CHNG SMOKING 3-10 MIN: CPT | Performed by: STUDENT IN AN ORGANIZED HEALTH CARE EDUCATION/TRAINING PROGRAM

## 2024-03-18 PROCEDURE — 78452 HT MUSCLE IMAGE SPECT MULT: CPT

## 2024-03-18 PROCEDURE — 700111 HCHG RX REV CODE 636 W/ 250 OVERRIDE (IP): Mod: UD

## 2024-03-18 PROCEDURE — 96372 THER/PROPH/DIAG INJ SC/IM: CPT | Mod: XU

## 2024-03-18 PROCEDURE — 99223 1ST HOSP IP/OBS HIGH 75: CPT | Mod: 25 | Performed by: STUDENT IN AN ORGANIZED HEALTH CARE EDUCATION/TRAINING PROGRAM

## 2024-03-18 PROCEDURE — 96365 THER/PROPH/DIAG IV INF INIT: CPT | Mod: XU

## 2024-03-18 RX ORDER — FUROSEMIDE 40 MG/1
40 TABLET ORAL DAILY
Status: DISCONTINUED | OUTPATIENT
Start: 2024-03-18 | End: 2024-03-18 | Stop reason: HOSPADM

## 2024-03-18 RX ORDER — MAGNESIUM SULFATE 1 G/100ML
1 INJECTION INTRAVENOUS ONCE
Status: COMPLETED | OUTPATIENT
Start: 2024-03-18 | End: 2024-03-18

## 2024-03-18 RX ORDER — REGADENOSON 0.08 MG/ML
0.4 INJECTION, SOLUTION INTRAVENOUS ONCE
Status: COMPLETED | OUTPATIENT
Start: 2024-03-18 | End: 2024-03-18

## 2024-03-18 RX ORDER — REGADENOSON 0.08 MG/ML
INJECTION, SOLUTION INTRAVENOUS
Status: COMPLETED
Start: 2024-03-18 | End: 2024-03-18

## 2024-03-18 RX ORDER — AMLODIPINE BESYLATE 10 MG/1
10 TABLET ORAL DAILY
Status: DISCONTINUED | OUTPATIENT
Start: 2024-03-18 | End: 2024-03-18 | Stop reason: HOSPADM

## 2024-03-18 RX ORDER — CLOPIDOGREL BISULFATE 75 MG/1
75 TABLET ORAL DAILY
Status: DISCONTINUED | OUTPATIENT
Start: 2024-03-18 | End: 2024-03-18 | Stop reason: HOSPADM

## 2024-03-18 RX ORDER — HYDROCODONE BITARTRATE AND ACETAMINOPHEN 10; 325 MG/1; MG/1
1 TABLET ORAL EVERY 6 HOURS PRN
Status: DISCONTINUED | OUTPATIENT
Start: 2024-03-18 | End: 2024-03-18 | Stop reason: HOSPADM

## 2024-03-18 RX ORDER — CARVEDILOL 12.5 MG/1
12.5 TABLET ORAL 2 TIMES DAILY
Status: DISCONTINUED | OUTPATIENT
Start: 2024-03-18 | End: 2024-03-18 | Stop reason: HOSPADM

## 2024-03-18 RX ORDER — TRAZODONE HYDROCHLORIDE 150 MG/1
300 TABLET ORAL NIGHTLY
Status: DISCONTINUED | OUTPATIENT
Start: 2024-03-18 | End: 2024-03-18

## 2024-03-18 RX ORDER — AMINOPHYLLINE 25 MG/ML
100 INJECTION, SOLUTION INTRAVENOUS
Status: DISCONTINUED | OUTPATIENT
Start: 2024-03-18 | End: 2024-03-18 | Stop reason: HOSPADM

## 2024-03-18 RX ORDER — ATORVASTATIN CALCIUM 80 MG/1
80 TABLET, FILM COATED ORAL DAILY
Status: DISCONTINUED | OUTPATIENT
Start: 2024-03-18 | End: 2024-03-18 | Stop reason: HOSPADM

## 2024-03-18 RX ORDER — TRAZODONE HYDROCHLORIDE 150 MG/1
300 TABLET ORAL NIGHTLY
Status: DISCONTINUED | OUTPATIENT
Start: 2024-03-18 | End: 2024-03-18 | Stop reason: HOSPADM

## 2024-03-18 RX ORDER — NITROGLYCERIN 0.4 MG/1
0.4 TABLET SUBLINGUAL
Status: DISCONTINUED | OUTPATIENT
Start: 2024-03-18 | End: 2024-03-18 | Stop reason: HOSPADM

## 2024-03-18 RX ORDER — MORPHINE SULFATE 4 MG/ML
2 INJECTION INTRAVENOUS
Status: DISCONTINUED | OUTPATIENT
Start: 2024-03-18 | End: 2024-03-18 | Stop reason: HOSPADM

## 2024-03-18 RX ORDER — OMEPRAZOLE 20 MG/1
40 CAPSULE, DELAYED RELEASE ORAL DAILY
Status: DISCONTINUED | OUTPATIENT
Start: 2024-03-18 | End: 2024-03-18 | Stop reason: HOSPADM

## 2024-03-18 RX ORDER — ENOXAPARIN SODIUM 100 MG/ML
1 INJECTION SUBCUTANEOUS EVERY 12 HOURS
Status: DISCONTINUED | OUTPATIENT
Start: 2024-03-18 | End: 2024-03-18 | Stop reason: HOSPADM

## 2024-03-18 RX ADMIN — FUROSEMIDE 40 MG: 40 TABLET ORAL at 05:12

## 2024-03-18 RX ADMIN — MAGNESIUM SULFATE IN DEXTROSE 1 G: 10 INJECTION, SOLUTION INTRAVENOUS at 03:30

## 2024-03-18 RX ADMIN — AMLODIPINE BESYLATE 10 MG: 10 TABLET ORAL at 05:12

## 2024-03-18 RX ADMIN — CARVEDILOL 12.5 MG: 12.5 TABLET, FILM COATED ORAL at 05:12

## 2024-03-18 RX ADMIN — HYDROCODONE BITARTRATE AND ACETAMINOPHEN 1 TABLET: 10; 325 TABLET ORAL at 04:26

## 2024-03-18 RX ADMIN — OMEPRAZOLE 40 MG: 20 CAPSULE, DELAYED RELEASE ORAL at 05:12

## 2024-03-18 RX ADMIN — CLOPIDOGREL BISULFATE 75 MG: 75 TABLET ORAL at 05:12

## 2024-03-18 RX ADMIN — MORPHINE SULFATE 2 MG: 4 INJECTION, SOLUTION INTRAMUSCULAR; INTRAVENOUS at 07:48

## 2024-03-18 RX ADMIN — REGADENOSON 0.4 MG: 0.08 INJECTION, SOLUTION INTRAVENOUS at 11:16

## 2024-03-18 RX ADMIN — ATORVASTATIN CALCIUM 80 MG: 80 TABLET, FILM COATED ORAL at 05:12

## 2024-03-18 RX ADMIN — ENOXAPARIN SODIUM 100 MG: 100 INJECTION SUBCUTANEOUS at 05:12

## 2024-03-18 ASSESSMENT — PATIENT HEALTH QUESTIONNAIRE - PHQ9
1. LITTLE INTEREST OR PLEASURE IN DOING THINGS: NOT AT ALL
SUM OF ALL RESPONSES TO PHQ9 QUESTIONS 1 AND 2: 0
2. FEELING DOWN, DEPRESSED, IRRITABLE, OR HOPELESS: NOT AT ALL

## 2024-03-18 ASSESSMENT — ENCOUNTER SYMPTOMS: DIZZINESS: 1

## 2024-03-18 ASSESSMENT — FIBROSIS 4 INDEX
FIB4 SCORE: 1.09
FIB4 SCORE: 1.09

## 2024-03-18 ASSESSMENT — PAIN DESCRIPTION - PAIN TYPE
TYPE: ACUTE PAIN
TYPE: CHRONIC PAIN
TYPE: ACUTE PAIN
TYPE: CHRONIC PAIN
TYPE: ACUTE PAIN
TYPE: CHRONIC PAIN
TYPE: ACUTE PAIN

## 2024-03-18 ASSESSMENT — LIFESTYLE VARIABLES
DOES PATIENT WANT TO STOP DRINKING: NO
TOTAL SCORE: 0
HOW MANY TIMES IN THE PAST YEAR HAVE YOU HAD 5 OR MORE DRINKS IN A DAY: 0
HAVE YOU EVER FELT YOU SHOULD CUT DOWN ON YOUR DRINKING: NO
EVER HAD A DRINK FIRST THING IN THE MORNING TO STEADY YOUR NERVES TO GET RID OF A HANGOVER: NO
TOTAL SCORE: 0
CONSUMPTION TOTAL: NEGATIVE
HAVE PEOPLE ANNOYED YOU BY CRITICIZING YOUR DRINKING: NO
ALCOHOL_USE: NO
ON A TYPICAL DAY WHEN YOU DRINK ALCOHOL HOW MANY DRINKS DO YOU HAVE: 0
AVERAGE NUMBER OF DAYS PER WEEK YOU HAVE A DRINK CONTAINING ALCOHOL: 0
EVER FELT BAD OR GUILTY ABOUT YOUR DRINKING: NO
TOTAL SCORE: 0

## 2024-03-18 NOTE — HOSPITAL COURSE
Divine Lugo is a 53 y.o. male past medical history of CAD status post multiple stents, prior CVA on Eliquis, CHF who presented 3/18/2024 with chest pain that started when his service dog woke him up from sleep.      Patient initially thought he was having a panic attack went back to sleep was woken up again by his where he continued to have chest pain.  He reported nitroglycerin did not help his pain.  Troponin at outside facility was, 22. EKG no changes of ischemia.  Also reports having increased weight gain despite Lasix.  Additionally reports having increased dizzy spells with near syncope.  Denies any loss of consciousness.    In the ED, vital signs stable. He denies chest pain. Repeat troponin 25. EKG reviewed no st elevation myocardial infarction. He will be admitted for nuclear stress test.    Stress test completed showed no evidence of significant jeopardized viable myocardium or prior myocardial infarction.  During previous admission patient had received an echocardiogram that showed an estimated ejection fraction 52% with normal left ventricular chamber size and mild eccentric aortic insufficiency.      Upon examination the patient is stating that he does not have any chest pain and that he is feeling better than when he came in.  Discussed with the patient results of both echocardiogram and stress test.  Patient endorses that he is compliant with his medications and that he has a primary care that he did not like but was willing to try seeing again.  Discussed that he has a follow-up appoint with cardiology on March 25 for close follow-up post stent placement.  Patient stated understanding.  Okay discharge at this time.

## 2024-03-18 NOTE — PROGRESS NOTES
53-year-old male with CAD s/p stent, most recent stent placed here in [facility] in February presents to ED for chest pain.  States that chest pain woke him up from his sleep, substernal and left-sided.  He took 3 nitroglycerin without relief.  At outside facility, EKG showing no changes and initial troponin negative.  Request be transferred for cardiology consultation

## 2024-03-18 NOTE — DISCHARGE PLANNING
DC Transport Scheduled    Transport Company Scheduled:  [employer]   Spoke with April at [facility] to schedule transport.  [facility] Trip #: [id number]    Scheduled Date: 3/18/2024  Scheduled Time: 1500    Destination: Home   Destination address: [address] [address] [address]    Notified care team of scheduled transport via [employer].     If there are any changes needed to the DC transportation scheduled, please contact [facility] at ext. [phone] between the hours of 5025-1945 Mon-Fri. If outside those hours, contact the ED Case Manager at ext. [phone].

## 2024-03-18 NOTE — PROGRESS NOTES
Pt back from [facility] med. Pt provided with boxed lunch. Call light and personal belongings within reach of pt.

## 2024-03-18 NOTE — H&P
Hospital Medicine History & Physical Note    Date of Service  3/18/2024    Primary Care Physician  Atif Gonsalez M.D.    Consultants  None     Code Status  Full Code    Chief Complaint  Chest pain       History of Presenting Illness  Divine Lugo is a 53 y.o. male past medical history of CAD status post multiple stents, prior CVA on Eliquis, CHF who presented 3/18/2024 with chest pain that started when his service dog woke him up from sleep.  Patient initially thought he was having a panic attack went back to sleep was woken up again by his where he continued to have chest pain.  He reported nitroglycerin did not help his pain.  Troponin at outside facility was, 22. EKG no changes of ischemia.  Also reports having increased weight gain despite Lasix.  Additionally reports having increased dizzy spells with near syncope.  Denies any loss of consciousness.  He currently does not have any chest pain at [facility].  Repeat troponin 25. EKG reviewed no st elevation myocardial infarction. He will be admitted for nuclear stress test and will keep him on 1 mg/kg lovenox in the event he needs further cardiac intervention.    I discussed the plan of care with patient.    Review of Systems  Review of Systems   Cardiovascular:  Positive for chest pain.   Neurological:  Positive for dizziness.       Past Medical History   has a past medical history of Angina, Arthritis, Back fracture, Backpain, CAD (coronary artery disease), Cancer (HCC), DVT, Fracture closed, pelvis, HTN (hypertension) (06/07/2012), Hypertension, Lipoma NOS, Myocardial infarct (HCC), Nephrolithiasis (06/07/2012), Nonmalignant tumors, Personal history of venous thrombosis and embolism, Pneumonia, Pulmonary embolism ([facility]), Renal disorder, Stroke ([facility]), Stroke determined by clinical assessment ([facility]) (11/21/2023), and Unspecified urinary incontinence (PE).    Surgical History   has a past surgical history that includes other orthopedic  surgery; other; other cardiac surgery; cystoscopy stent placement (6/3/2012); ureteroscopy (6/3/2012); and lasertripsy (6/3/2012).     Family History  family history includes Dementia in his mother.   Family history reviewed with patient. There is no family history that is pertinent to the chief complaint.     Social History   reports that he has been smoking cigarettes. He has been exposed to tobacco smoke. His smokeless tobacco use includes chew. He reports current drug use. Drug: Marijuana. He reports that he does not drink alcohol.    Allergies  Allergies   Allergen Reactions    Gabapentin Unspecified     Causes patient to seizures    Depakote [Valproic Acid] Unspecified     Non epileptic seizure ( stated by patient )    Ibuprofen      Hx kidney disease, still takes at home     Lisinopril      Note: Built up in system and caused overdose. Body not able to process medication.    Topiramate      Note: Unknown.       Medications  Prior to Admission Medications   Prescriptions Last Dose Informant Patient Reported? Taking?   EPINEPHrine (EPIPEN) 0.3 MG/0.3ML Solution Auto-injector solution for injection  at unknown Significant Other No No   Sig: INJECT CONTENTS OF 1 PEN SUBCUTANEOUSLY AS NEEDED FOR ALLERGIC REACTION MAY REPEAT DOSE AFTER 5 TO 15 MINTUTES   NON SPECIFIED  at unknown Significant Other Yes No   Sig: Take 1 Tablet by mouth 2 times a day. KRATOM   acetaminophen (TYLENOL) 500 MG Tab  at unknown Significant Other No No   Sig: Take 2 Tablets by mouth 3 times a day as needed for Mild Pain.   amLODIPine (NORVASC) 10 MG Tab 3/17/2024 at AM  No Yes   Sig: Take 1 Tablet by mouth every day.   apixaban (ELIQUIS) 5mg Tab 3/17/2024 at AM  No Yes   Sig: Take 1 Tablet by mouth 2 times a day.   aspirin (ASA) 81 MG Chew Tab chewable tablet  at unknown  No No   Sig: Chew 1 Tablet every day.   atorvastatin (LIPITOR) 80 MG tablet  at AM  No No   Sig: Take 1 Tablet by mouth every day.   carvedilol (COREG) 12.5 MG Tab  3/17/2024 at AM  No Yes   Sig: Take 1 Tablet by mouth 2 times a day.   clopidogrel (PLAVIX) 75 MG Tab 3/17/2024 at AM  No Yes   Sig: Take 1 Tablet by mouth every day.   cyclobenzaprine (FLEXERIL) 5 mg tablet 3/17/2024 at PM Significant Other Yes Yes   Sig: Take 5-10 mg by mouth 3 times a day as needed.   diphenhydrAMINE (BENADRYL ALLERGY) 25 MG capsule  at unknown Significant Other Yes No   Sig: Take 25 mg by mouth at bedtime as needed (congestion).   furosemide (LASIX) 40 MG Tab 3/17/2024 at AM Significant Other No Yes   Sig: Take 1 Tablet by mouth every day.   isosorbide mononitrate SR (IMDUR) 30 MG TABLET SR 24 HR 3/17/2024 at AM  No Yes   Sig: Take 1 Tablet by mouth every day.   losartan (COZAAR) 100 MG Tab 3/17/2024 at AM  No Yes   Sig: Take 1 Tablet by mouth every day.   nitroglycerin (NITROSTAT) 0.4 MG SL Tab 3/17/2024 at PM Significant Other No Yes   Sig: Place 1 Tablet under the tongue as needed for Chest Pain for up to 30 days. DISSOLVE ONE TABLET UNDER THE TONGUE EVERY 5 MINUTES AS NEEDED FOR CHEST PAIN DO NOT EXCEED A TOTAL OF 3 DOSES IN 15 MINUTES   omeprazole (PRILOSEC) 40 MG delayed-release capsule 3/17/2024 at AM Significant Other Yes Yes   Sig: Take 40 mg by mouth every day.   ondansetron (ZOFRAN ODT) 4 MG TABLET DISPERSIBLE  at unknown Significant Other No No   Sig: Take 1 Tablet by mouth every 8 hours as needed for Nausea/Vomiting.   potassium chloride SA (KDUR) 20 MEQ Tab CR 3/17/2024 at AM Significant Other Yes Yes   Sig: Take 20 mEq by mouth every day.   traZODone (DESYREL) 100 MG Tab 3/18/2024 at PM Significant Other Yes Yes   Sig: Take 200 mg by mouth every evening. 150 mg X 2 tablets with 100 mg X 2 tablets for a total nightly dose of 500 mg   traZODone (DESYREL) 150 MG Tab 3/18/2024 at PM Significant Other Yes Yes   Sig: Take 300 mg by mouth every evening. 150 mg X 2 tablets with 100 mg X 2 tablets for a total nightly dose of 500 mg      Facility-Administered Medications: None  "      Physical Exam  Temp:  [36.5 °C (97.7 °F)-36.8 °C (98.2 °F)] 36.5 °C (97.7 °F)  Pulse:  [71-87] 71  Resp:  [16-18] 16  BP: (139-154)/(84-89) 154/89  SpO2:  [94 %-95 %] 94 %  Blood Pressure: (!) 154/89   Temperature: 36.5 °C (97.7 °F)   Pulse: 71   Respiration: 16   Pulse Oximetry: 94 %       Physical Exam  Vitals and nursing note reviewed.   Constitutional:       Appearance: Normal appearance.   HENT:      Head: Normocephalic and atraumatic.      Right Ear: Tympanic membrane normal.      Left Ear: Tympanic membrane normal.      Nose: Nose normal.      Mouth/Throat:      Mouth: Mucous membranes are moist.      Pharynx: Oropharynx is clear.   Eyes:      Extraocular Movements: Extraocular movements intact.      Pupils: Pupils are equal, round, and reactive to light.   Cardiovascular:      Rate and Rhythm: Normal rate and regular rhythm.      Pulses: Normal pulses.      Heart sounds: Normal heart sounds.   Pulmonary:      Effort: Pulmonary effort is normal.      Breath sounds: Normal breath sounds.   Abdominal:      General: Bowel sounds are normal. There is no distension.      Palpations: Abdomen is soft. There is no mass.   Musculoskeletal:         General: Normal range of motion.      Cervical back: Neck supple.      Right lower leg: Edema present.      Left lower leg: Edema present.   Skin:     General: Skin is warm.      Capillary Refill: Capillary refill takes less than 2 seconds.   Neurological:      General: No focal deficit present.      Mental Status: He is alert. Mental status is at baseline.   Psychiatric:         Mood and Affect: Mood normal.         Behavior: Behavior normal.         Laboratory:          No results for input(s): \"ALTSGPT\", \"ASTSGOT\", \"ALKPHOSPHAT\", \"TBILIRUBIN\", \"DBILIRUBIN\", \"GAMMAGT\", \"AMYLASE\", \"LIPASE\", \"ALB\", \"PREALBUMIN\", \"GLUCOSE\" in the last 72 hours.      No results for input(s): \"NTPROBNP\" in the last 72 hours.      Recent Labs     03/18/24  0138   TROPONINT 35* "       Imaging:  NM-CARDIAC STRESS TEST    (Results Pending)       EKG:  I have personally reviewed the images and compared with prior images.    Assessment/Plan:  Justification for Admission Status  I anticipate this patient will require at least two midnights for appropriate medical management, necessitating inpatient admission because chest pain requiring further workup.    Patient will need a Telemetry bed on MEDICAL service .  The need is secondary to see above.    * Chest pain, rule out acute myocardial infarction- (present on admission)  Assessment & Plan  The ASCVD Risk score (Britany LOPEZ, et al., 2019) failed to calculate for the following reasons:    The patient has a prior MI or stroke diagnosis  Telemetry monitoring  Serial troponin - monitor trajectory likely anginal however given extensive cardiac history will proceed with NMS.   Continue with Plavix  Substitute Lovenox 1 mg/kg for eliquis   High intensity statin   C/w Coreg 12.5 mg BID       Gastroesophageal reflux disease with esophagitis without hemorrhage- (present on admission)  Assessment & Plan  Omeprazole     Obesity- (present on admission)  Assessment & Plan  BMI 35.77   Encourage dietary land lifestyle modification    Primary hypertension- (present on admission)  Assessment & Plan  C/w Amlodipine, Coreg and Losartan   IV antihypertensives as needed    Tobacco abuse- (present on admission)  Assessment & Plan  Tobacco cessation education provided for more than 4 minutes, discussed options of nicotine patch, medical treatment with wellbutrin and chantix. Discussed the risks of smoking including increased risk of heart disease, stroke, cancer and COPD  Nicotine patch protocol         VTE prophylaxis: SCDs/TEDs and therapeutic anticoagulation with lovenox

## 2024-03-18 NOTE — PROGRESS NOTES
Pt down to [facility] stating he was told [facility] RN would set up MTM ride for him. This RN called [facility] charge who states she and floor RN did not make ride appt for pt. This RN called social work who is going to place ride order.

## 2024-03-18 NOTE — PROGRESS NOTES
Patient arrived from [facility] via stretcher. Ambulated from stretcher to bed with a smooth and steady gait. A&Ox4. VSS on RA. Patient reports 9/10 pain in his right neck, shoulder and a headache that he says is chronic since he had a stroke last year. Admission profile completed. Patient reports that he and his girlfriend ar homeless[facility] not [facility] or running water. Patient also reports that he has a dog and he ran out of food for the dog on 3/16. Whiteboard updated. Patient updated that hospitalist will be at bedside soon. Bed locked, in lowest position. Call light within reach.

## 2024-03-18 NOTE — DISCHARGE SUMMARY
Discharge Summary    CHIEF COMPLAINT ON ADMISSION  No chief complaint on file.      Reason for Admission  unstable angina     Admission Date  3/18/2024    CODE STATUS  Full Code    HPI & HOSPITAL COURSE  Divine Lugo is a 53 y.o. male past medical history of CAD status post multiple stents, prior CVA on Eliquis, CHF who presented 3/18/2024 with chest pain that started when his service dog woke him up from sleep.      Patient initially thought he was having a panic attack went back to sleep was woken up again by his where he continued to have chest pain.  He reported nitroglycerin did not help his pain.  Troponin at outside facility was, 22. EKG no changes of ischemia.  Also reports having increased weight gain despite Lasix.  Additionally reports having increased dizzy spells with near syncope.  Denies any loss of consciousness.    In the ED, vital signs stable. He denies chest pain. Repeat troponin 25. EKG reviewed no st elevation myocardial infarction. He will be admitted for nuclear stress test.    Stress test completed showed no evidence of significant jeopardized viable myocardium or prior myocardial infarction.  During previous admission patient had received an echocardiogram that showed an estimated ejection fraction 52% with normal left ventricular chamber size and mild eccentric aortic insufficiency.      Upon examination the patient is stating that he does not have any chest pain and that he is feeling better than when he came in.  Discussed with the patient results of both echocardiogram and stress test.  Patient endorses that he is compliant with his medications and that he has a primary care that he did not like but was willing to try seeing again.  Discussed that he has a follow-up appoint with cardiology on March 25 for close follow-up post stent placement.  Patient stated understanding.  Okay discharge at this time.      Therefore, he is discharged in good and stable condition to home with  close outpatient follow-up.    The patient recovered much more quickly than anticipated on admission.    Discharge Date  03/18/24      FOLLOW UP ITEMS POST DISCHARGE  Discharge Instructions per TONYA Maurice     -Continue taking medications as prescribed.  -Follow-up with primary care status post hospitalization.  -You have a follow-up appointment with cardiology on March 25.     DIET: Cardiac     ACTIVITY: Tolerated     DIAGNOSIS: Chest pain     Return to ER if you start to experience any numbness and tingling, chest pain, palpitations, or shortness of breath.    DISCHARGE DIAGNOSES  Principal Problem (Resolved):    Chest pain, rule out acute myocardial infarction (POA: Yes)  Active Problems:    Primary hypertension (POA: Yes)      Overview: Patient with noted continued elevated blood pressure in clinic of 196/92.       Discussed with patient who stated it is because I am in pain.     Obesity (POA: Yes)    Gastroesophageal reflux disease with esophagitis without hemorrhage (POA: Yes)    Tobacco abuse (POA: Yes)      FOLLOW UP  Future Appointments   Date Time Provider Department Center   3/25/2024  1:15 PM TONYA Curry None     No follow-up provider specified.    MEDICATIONS ON DISCHARGE     Medication List        CHANGE how you take these medications        Instructions   traZODone 150 MG Tabs  What changed: Another medication with the same name was removed. Continue taking this medication, and follow the directions you see here.  Commonly known as: Desyrel   Take 300 mg by mouth every evening. 150 mg X 2 tablets with 100 mg X 2 tablets for a total nightly dose of 500 mg  Dose: 300 mg            CONTINUE taking these medications        Instructions   acetaminophen 500 MG Tabs  Commonly known as: Tylenol   Take 2 Tablets by mouth 3 times a day as needed for Mild Pain.  Dose: 1,000 mg     amLODIPine 10 MG Tabs  Commonly known as: Norvasc   Doctor's comments: .  Take 1 Tablet by mouth  every day.  Dose: 10 mg     Aspirin Low Dose 81 MG Chew chewable tablet  Generic drug: aspirin   Doctor's comments: .  Chew 1 Tablet every day.  Dose: 81 mg     atorvastatin 80 MG tablet  Commonly known as: Lipitor   Doctor's comments: .  Take 1 Tablet by mouth every day.  Dose: 80 mg     Benadryl Allergy 25 MG capsule  Generic drug: diphenhydrAMINE   Take 25 mg by mouth at bedtime as needed (congestion).  Dose: 25 mg     carvedilol 12.5 MG Tabs  Commonly known as: Coreg   Doctor's comments: .  Take 1 Tablet by mouth 2 times a day.  Dose: 12.5 mg     clopidogrel 75 MG Tabs  Commonly known as: Plavix   Doctor's comments: .  Take 1 Tablet by mouth every day.  Dose: 75 mg     cyclobenzaprine 5 mg tablet  Commonly known as: Flexeril   Take 5-10 mg by mouth 3 times a day as needed.  Dose: 5-10 mg     Eliquis 5 MG Tabs  Generic drug: apixaban   Doctor's comments: .  Take 1 Tablet by mouth 2 times a day.  Dose: 5 mg     EPINEPHrine 0.3 MG/0.3ML Soaj solution for injection  Commonly known as: Epipen   INJECT CONTENTS OF 1 PEN SUBCUTANEOUSLY AS NEEDED FOR ALLERGIC REACTION MAY REPEAT DOSE AFTER 5 TO 15 MINTUTES     furosemide 40 MG Tabs  Commonly known as: Lasix   Take 1 Tablet by mouth every day.  Dose: 40 mg     isosorbide mononitrate SR 30 MG Tb24  Commonly known as: Imdur   Doctor's comments: .  Take 1 Tablet by mouth every day.  Dose: 30 mg     losartan 100 MG Tabs  Commonly known as: Cozaar   Doctor's comments: .  Take 1 Tablet by mouth every day.  Dose: 100 mg     nitroglycerin 0.4 MG Subl  Commonly known as: Nitrostat   Place 1 Tablet under the tongue as needed for Chest Pain for up to 30 days. DISSOLVE ONE TABLET UNDER THE TONGUE EVERY 5 MINUTES AS NEEDED FOR CHEST PAIN DO NOT EXCEED A TOTAL OF 3 DOSES IN 15 MINUTES  (Place 1 Tablet under the tongue as needed for Chest Pain for up to 30 days. DISSOLVE ONE TABLET UNDER THE TONGUE EVERY 5 MINUTES AS NEEDED FOR CHEST PAIN DO NOT EXCEED A TOTAL OF 3 DOSES IN 15  MINUTES)  Dose: 0.4 mg     NON SPECIFIED   Take 1 Tablet by mouth 2 times a day. KRATOM  Dose: 1 Tablet     omeprazole 40 MG delayed-release capsule  Commonly known as: PriLOSEC   Take 40 mg by mouth every day.  Dose: 40 mg     ondansetron 4 MG Tbdp  Commonly known as: Zofran ODT   Take 1 Tablet by mouth every 8 hours as needed for Nausea/Vomiting.  Dose: 4 mg     potassium chloride SA 20 MEQ Tbcr  Commonly known as: Kdur   Take 20 mEq by mouth every day.  Dose: 20 mEq              Allergies  Allergies   Allergen Reactions    Gabapentin Unspecified     Causes patient to seizures    Depakote [Valproic Acid] Unspecified     Non epileptic seizure ( stated by patient )    Ibuprofen      Hx kidney disease, still takes at home     Lisinopril      Note: Built up in system and caused overdose. Body not able to process medication.    Topiramate      Note: Unknown.       DIET  Orders Placed This Encounter   Procedures    Diet Order Diet: Cardiac; Miscellaneous modifications: (optional): No Decaf, No Caffeine(for test)     Standing Status:   Standing     Number of Occurrences:   1     Order Specific Question:   Diet:     Answer:   Cardiac [6]     Order Specific Question:   Miscellaneous modifications: (optional)     Answer:   No Decaf, No Caffeine(for test) [11]       ACTIVITY  As tolerated.  Weight bearing as tolerated    CONSULTATIONS  None    PROCEDURES  None    IMAGING  NM-CARDIAC STRESS TEST   Final Result        LABORATORY  Lab Results   Component Value Date    SODIUM 141 03/18/2024    POTASSIUM 4.1 03/18/2024    CHLORIDE 106 03/18/2024    CO2 24 03/18/2024    GLUCOSE 110 (H) 03/18/2024    BUN 16 03/18/2024    CREATININE 1.16 03/18/2024    GLOMRATE 49 (L) 11/03/2023        Lab Results   Component Value Date    WBC 9.8 03/18/2024    HEMOGLOBIN 15.1 03/18/2024    HEMATOCRIT 44.2 03/18/2024    PLATELETCT 218 03/18/2024

## 2024-03-18 NOTE — CARE PLAN
The patient is Watcher - Medium risk of patient condition declining or worsening    Shift Goals  Patient Goals: rest, pain control  Family Goals: not at bedside    Progress made toward(s) clinical / shift goals:    Problem: Knowledge Deficit - Standard  Goal: Patient and family/care givers will demonstrate understanding of plan of care, disease process/condition, diagnostic tests and medications  Description: Target End Date:  1-3 days or as soon as patient condition allows    Document in Patient Education    1.  Patient and family/caregiver oriented to unit, equipment, visitation policy and means for communicating concern  2.  Complete/review Learning Assessment  3.  Assess knowledge level of disease process/condition, treatment plan, diagnostic tests and medications  4.  Explain disease process/condition, treatment plan, diagnostic tests and medications  Outcome: Progressing     Problem: Pain - Standard  Goal: Alleviation of pain or a reduction in pain to the patient’s comfort goal  Description: Target End Date:  Prior to discharge or change in level of care    Document on Vitals flowsheet    1.  Document pain using the appropriate pain scale per order or unit policy  2.  Educate and implement non-pharmacologic comfort measures (i.e. relaxation, distraction, massage, cold/heat therapy, etc.)  3.  Pain management medications as ordered  4.  Reassess pain after pain med administration per policy  5.  If opiods administered assess patient's response to pain medication is appropriate per POSS sedation scale  6.  Follow pain management plan developed in collaboration with patient and interdisciplinary team (including palliative care or pain specialists if applicable)  Outcome: Progressing

## 2024-03-18 NOTE — DISCHARGE PLANNING
Received call from Cornelio in D/C lounge that patient needs assist with ride home. Cornelio verified address. Ride line order placed and face sheet faxed to Sabina in ride line. Called and spoke with Sabina with ride line to update. Message sent to Cornelio in D/C lounge to update.

## 2024-03-18 NOTE — DISCHARGE INSTRUCTIONS
Discharge Instructions per TONYA Maurice     -Continue taking medications as prescribed.  -Follow-up with primary care status post hospitalization.  -You have a follow-up appointment with cardiology on March 25.     DIET: Cardiac     ACTIVITY: Tolerated     DIAGNOSIS: Chest pain     Return to ER if you start to experience any numbness and tingling, chest pain, palpitations, or shortness of breath.  Nonspecific Chest Pain  Chest pain can be caused by many different conditions. Some causes of chest pain can be life-threatening. These will require treatment right away. Serious causes of chest pain include:  Heart attack.  A tear in the body's main blood vessel.  Redness and swelling (inflammation) around your heart.  Blood clot in your lungs.  Other causes of chest pain may not be so serious. These include:  Heartburn.  Anxiety or stress.  Damage to bones or muscles in your chest.  Lung infections.  Chest pain can feel like:  Pain or discomfort in your chest.  Crushing, pressure, aching, or squeezing pain.  Burning or tingling.  Dull or sharp pain that is worse when you move, cough, or take a deep breath.  Pain or discomfort that is also felt in your back, neck, jaw, shoulder, or arm, or pain that spreads to any of these areas.  It is hard to know whether your pain is caused by something that is serious or something that is not so serious. So it is important to see your doctor right away if you have chest pain.  Follow these instructions at home:  Medicines  Take over-the-counter and prescription medicines only as told by your doctor.  If you were prescribed an antibiotic medicine, take it as told by your doctor. Do not stop taking the antibiotic even if you start to feel better.  Lifestyle    Rest as told by your doctor.  Do not use any products that contain nicotine or tobacco, such as cigarettes, e-cigarettes, and chewing tobacco. If you need help quitting, ask your doctor.  Do not drink alcohol.  Make  lifestyle changes as told by your doctor. These may include:  Getting regular exercise. Ask your doctor what activities are safe for you.  Eating a heart-healthy diet. A diet and nutrition specialist (dietitian) can help you to learn healthy eating options.  Staying at a healthy weight.  Treating diabetes or high blood pressure, if needed.  Lowering your stress. Activities such as yoga and relaxation techniques can help.  General instructions  Pay attention to any changes in your symptoms. Tell your doctor about them or any new symptoms.  Avoid any activities that cause chest pain.  Keep all follow-up visits as told by your doctor. This is important. You may need more testing if your chest pain does not go away.  Contact a doctor if:  Your chest pain does not go away.  You feel depressed.  You have a fever.  Get help right away if:  Your chest pain is worse.  You have a cough that gets worse, or you cough up blood.  You have very bad (severe) pain in your belly (abdomen).  You pass out (faint).  You have either of these for no clear reason:  Sudden chest discomfort.  Sudden discomfort in your arms, back, neck, or jaw.  You have shortness of breath at any time.  You suddenly start to sweat, or your skin gets clammy.  You feel sick to your stomach (nauseous).  You throw up (vomit).  You suddenly feel lightheaded or dizzy.  You feel very weak or tired.  Your heart starts to beat fast, or it feels like it is skipping beats.  These symptoms may be an emergency. Do not wait to see if the symptoms will go away. Get medical help right away. Call your local emergency services (911 in the [place] Do not drive yourself to the hospital.  Summary  Chest pain can be caused by many different conditions. The cause may be serious and need treatment right away. If you have chest pain, see your doctor right away.  Follow your doctor's instructions for taking medicines and making lifestyle changes.  Keep all follow-up visits as told by  your doctor. This includes visits for any further testing if your chest pain does not go away.  Be sure to know the signs that show that your condition has become worse. Get help right away if you have these symptoms.  This information is not intended to replace advice given to you by your health care provider. Make sure you discuss any questions you have with your health care provider.  Document Revised: 03/03/2022 Document Reviewed: 03/03/2022  [employer] Patient Education © 2023 [employer]    Radial Site Care  The following information offers guidance on how to care for yourself after your procedure. Your health care provider may also give you more specific instructions. If you have problems or questions, contact your health care provider.  What can I expect after the procedure?  After the procedure, it is common to have bruising and tenderness in the incision area.  Follow these instructions at home:  Incision site care    Follow instructions from your health care provider about how to take care of your incision site. Make sure you:  Wash your hands with soap and water for at least 20 seconds before and after you change your bandage (dressing). If soap and water are not available, use hand .  Change or remove your dressing as told by your health care provider.  Leave stitches (sutures), skin glue, or adhesive strips in place. These skin closures may need to stay in place for 2 weeks or longer. If adhesive strip edges start to loosen and curl up, you may trim the loose edges. Do not remove adhesive strips completely unless your health care provider tells you to do that.  Do not take baths, swim, or use a hot tub until your health care provider approves.  You may shower 24-48 hours after the procedure or as told by your health care provider.  Remove the dressing and gently wash the incision area with plain soap and water.  Pat the area dry with a clean towel.  Do not rub the site. That could cause  bleeding.  Do not apply powder or lotion to the site.  Check your incision site every day for signs of infection. Check for:  Redness, swelling, or pain.  Fluid or blood.  Warmth.  Pus or a bad smell.  Activity  For 24 hours after the procedure, or as directed by your health care provider:  Do not flex or bend the affected arm.  Do not push or pull heavy objects with the affected arm.  Do not operate machinery or power tools.  Do not drive. You should not drive yourself home from the hospital or clinic if you go home during that time period. You may drive 24 hours after the procedure unless your health care provider tells you not to.  Do not lift anything that is heavier than 10 lb (4.5 kg), or the limit that you are told, until your health care provider says that it is safe.  Return to your normal activities as told by your health care provider. Ask your health care provider what activities are safe for you and when you can return to work.  If you were given a sedative during the procedure, it can affect you for several hours. Do not drive or operate machinery until your health care provider says that it is safe.  General instructions  Take over-the-counter and prescription medicines only as told by your health care provider.  If you will be going home right after the procedure, plan to have a responsible adult care for you for the time you are told. This is important.  Keep all follow-up visits. This is important.  Contact a health care provider if:  You have a fever or chills.  You have any of these signs of infection at your incision site:  Redness, swelling, or pain.  Fluid or blood.  Warmth.  Pus or a bad smell.  Get help right away if:  The incision area swells very fast.  The incision area is bleeding, and the bleeding does not stop when you hold steady pressure on the area.  Your arm or hand becomes pale, cool, tingly, or numb.  These symptoms may represent a serious problem that is an emergency. Do not  wait to see if the symptoms will go away. Get medical help right away. Call your local emergency services (911 in the [place] Do not drive yourself to the hospital.  Summary  After the procedure, it is common to have bruising and tenderness at the incision site.  Follow instructions from your health care provider about how to take care of your radial site incision. Check the incision every day for signs of infection.  Do not lift anything that is heavier than 10 lb (4.5 kg), or the limit that you are told, until your health care provider says that it is safe.  Get help right away if the incision area swells very fast, you have bleeding at the incision site that will not stop, or your arm or hand becomes pale, cool, or numb.  This information is not intended to replace advice given to you by your health care provider. Make sure you discuss any questions you have with your health care provider.  Document Revised: 02/06/2022 Document Reviewed: 02/06/2022  [employer] Patient Education © 2023 [employer]

## 2024-03-18 NOTE — PROGRESS NOTES
Telemetry monitoring summary:     Rhythm: NSR  Rate: 79-82  Ectopy: PVCs, PACs, and 12 beats of V-Tach   .18/.08/.38

## 2024-03-18 NOTE — ASSESSMENT & PLAN NOTE
The ASCVD Risk score (Britany LOPEZ, et al., 2019) failed to calculate for the following reasons:    The patient has a prior MI or stroke diagnosis  Telemetry monitoring  Serial troponin - monitor trajectory likely anginal however given extensive cardiac history will proceed with NMS.   Continue with Plavix  Substitute Lovenox 1 mg/kg for eliquis   High intensity statin   C/w Coreg 12.5 mg BID

## 2024-03-18 NOTE — PROGRESS NOTES
4 Eyes Skin Assessment Completed by JACQUELINE Pastor and JACQUELINE Carbone.    Head WDL  Ears WDL  Nose WDL  Mouth WDL  Neck WDL  Breast/Chest WDL  Shoulder Blades WDL  Spine WDL  (R) Arm/Elbow/Hand WDL  (L) Arm/Elbow/Hand WDL  Abdomen WDL  Groin WDL  Scrotum/Coccyx/Buttocks WDL  (R) Leg WDL  (L) Leg WDL  (R) Heel/Foot/Toe Edema  (L) Heel/Foot/Toe Edema          Devices In Places Tele Box and Pulse Ox      Interventions In Place Pillows    Possible Skin Injury No    Pictures Uploaded Into Epic N/A  Wound Consult Placed N/A  RN Wound Prevention Protocol Ordered No

## 2024-03-18 NOTE — PROGRESS NOTES
Divine Lugo has been provided discharge instructions, to include follow up care, home medications, and activity/diet reviewed. Understanding verbalized. IV removed. Catheter tip intact, bleeding controlled. Arm band removed. Navya with social work working on pt MTM ride. Pt out of DCL at this time with personal belongings.

## 2024-04-15 ENCOUNTER — TELEPHONE (OUTPATIENT)
Dept: CARDIOLOGY | Facility: MEDICAL CENTER | Age: 54
End: 2024-04-15
Payer: MEDICAID

## 2024-05-01 ENCOUNTER — APPOINTMENT (OUTPATIENT)
Dept: CARDIOLOGY | Facility: PHYSICIAN GROUP | Age: 54
End: 2024-05-01
Payer: MEDICAID

## 2024-05-01 VITALS
WEIGHT: 235 LBS | RESPIRATION RATE: 16 BRPM | SYSTOLIC BLOOD PRESSURE: 132 MMHG | HEIGHT: 68 IN | HEART RATE: 80 BPM | DIASTOLIC BLOOD PRESSURE: 72 MMHG | OXYGEN SATURATION: 95 % | BODY MASS INDEX: 35.61 KG/M2

## 2024-05-01 DIAGNOSIS — K21.00 GASTROESOPHAGEAL REFLUX DISEASE WITH ESOPHAGITIS WITHOUT HEMORRHAGE: ICD-10-CM

## 2024-05-01 DIAGNOSIS — Z72.0 TOBACCO ABUSE: ICD-10-CM

## 2024-05-01 DIAGNOSIS — I21.4 NSTEMI (NON-ST ELEVATED MYOCARDIAL INFARCTION) (HCC): ICD-10-CM

## 2024-05-01 DIAGNOSIS — I25.10 CORONARY ARTERY DISEASE WITHOUT ANGINA PECTORIS, UNSPECIFIED VESSEL OR LESION TYPE, UNSPECIFIED WHETHER NATIVE OR TRANSPLANTED HEART: ICD-10-CM

## 2024-05-01 DIAGNOSIS — Z86.59 HISTORY OF POSTTRAUMATIC STRESS DISORDER (PTSD): ICD-10-CM

## 2024-05-01 DIAGNOSIS — E78.2 MIXED HYPERLIPIDEMIA: ICD-10-CM

## 2024-05-01 DIAGNOSIS — I25.112 CORONARY ARTERY DISEASE INVOLVING NATIVE CORONARY ARTERY OF NATIVE HEART WITH REFRACTORY ANGINA PECTORIS (HCC): ICD-10-CM

## 2024-05-01 DIAGNOSIS — I10 PRIMARY HYPERTENSION: ICD-10-CM

## 2024-05-01 PROCEDURE — 3075F SYST BP GE 130 - 139MM HG: CPT | Performed by: NURSE PRACTITIONER

## 2024-05-01 PROCEDURE — 3078F DIAST BP <80 MM HG: CPT | Performed by: NURSE PRACTITIONER

## 2024-05-01 PROCEDURE — 99214 OFFICE O/P EST MOD 30 MIN: CPT | Performed by: NURSE PRACTITIONER

## 2024-05-01 RX ORDER — NITROGLYCERIN 0.4 MG/1
0.4 TABLET SUBLINGUAL
Qty: 25 TABLET | Refills: 6 | Status: SHIPPED | OUTPATIENT
Start: 2024-05-01

## 2024-05-01 RX ORDER — ISOSORBIDE MONONITRATE 60 MG/1
30 TABLET, EXTENDED RELEASE ORAL DAILY
Qty: 100 TABLET | Refills: 1 | Status: SHIPPED | OUTPATIENT
Start: 2024-05-01

## 2024-05-01 ASSESSMENT — ENCOUNTER SYMPTOMS
BRUISES/BLEEDS EASILY: 0
CHILLS: 0
PND: 0
FEVER: 0
DIZZINESS: 0
COUGH: 0
LOSS OF CONSCIOUSNESS: 0
NAUSEA: 0
ABDOMINAL PAIN: 0
INSOMNIA: 0
NERVOUS/ANXIOUS: 1
MYALGIAS: 0
ORTHOPNEA: 0
SHORTNESS OF BREATH: 1
HEADACHES: 0
PALPITATIONS: 0

## 2024-05-01 ASSESSMENT — FIBROSIS 4 INDEX: FIB4 SCORE: 1.03

## 2024-05-01 NOTE — PROGRESS NOTES
Chief Complaint   Patient presents with    Hospital Follow-up    Coronary Artery Disease    HTN (Controlled)    Hyperlipidemia    Nicotine Dependence           Gastrophageal Reflux       Subjective     Divine Lugo is a 53 y.o. male who presents today for hospital follow-up of CAD, HTN, hyperlipidemia, prior CVA, GERD and PTSD.    Divine is a 53 year old male with history of CAD (status post PCI/stents in 2016, 2021 and more recently 2024 x 2), history of CVA on Eliquis, HTN, hyperlipidemia, GERD and PTSD.    More recently, he had PCI/MAYRA to the proximal LAD on 2/25/2024, and then PCI/MAYRA to the first diagonal on 2/29/2024.    On 3/18/2024, he presented to [facility] with chest pain. EKGs were stable, he did have stress test and echo, both of which came back normal/stable. He was treated symptomatically, and sent him on Trazodone to help him sleep.    He is here today for hospital follow-up. He states he is smoking only 1 cigarette per day. He does have ongoing chest discomfort, both with rest and exertion. No recent palpitations, but he did while hospitalized; he states he has episodes of VT while in the hospital, but I cannot find any rhythm strips of this; some shortness of breath, but no orthopnea or PND; no dizziness or syncope; mild/stable LE edema.    Past Medical History:   Diagnosis Date    Angina     Arthritis     Back fracture     T12 THRU LUMBER FRACTURED         Backpain     CAD (coronary artery disease)     Cancer (HCC)     DVT     Fracture closed, pelvis     RIGHT    Hypertension     Lipoma NOS     Myocardial infarct (HCC)     Nephrolithiasis     Nonmalignant tumors     Lung, neck, shoulder    Personal history of venous thrombosis and embolism     PE, RLE DVT    Pneumonia     Pulmonary embolism (HCC)     Renal disorder     Kidney stones    Stroke (HCC) 11/2023     Past Surgical History:   Procedure Laterality Date    CYSTOSCOPY STENT PLACEMENT  6/3/2012    Performed by JAVIER MEDEIROS  SURGERY [facility]S    URETEROSCOPY  6/3/2012    Performed by JAVIER MEDEIROS at SURGERY [facility] ORS    LASERTRIPSY  6/3/2012    Performed by JAVIER MEDEIROS at SURGERY [facility]    OTHER      TUMOR REMOVAL X5 ON BACK    OTHER CARDIAC SURGERY      cath here on 6/29    OTHER ORTHOPEDIC SURGERY      wrist, knee, back     Family History   Problem Relation Age of Onset    Dementia Mother      Social History     Socioeconomic History    Marital status: Single     Spouse name: Not on file    Number of children: Not on file    Years of education: Not on file    Highest education level: Not on file   Occupational History    Not on file   Tobacco Use    Smoking status: Every Day     Types: Cigarettes     Passive exposure: Current    Smokeless tobacco: Current     Types: Chew    Tobacco comments:      chews/1 can q 2 days, smoke 2  cigs per day   Vaping Use    Vaping Use: Never used   Substance and Sexual Activity    Alcohol use: No     Comment: 23 year ago quit    Drug use: Yes     Types: Marijuana     Comment: at bedtime    Sexual activity: Not on file   Other Topics Concern    Not on file   Social History Narrative    ** Merged History Encounter **          Social Determinants of Health     Financial Resource Strain: Not on file   Food Insecurity: Not on file   Transportation Needs: Not on file   Physical Activity: Not on file   Stress: Not on file   Social Connections: Not on file   Intimate Partner Violence: Not on file   Housing Stability: Not on file     Allergies   Allergen Reactions    Gabapentin Unspecified     Causes patient to seizures    Depakote [Valproic Acid] Unspecified     Non epileptic seizure ( stated by patient )    Ibuprofen      Hx kidney disease, still takes at home     Lisinopril      Note: Built up in system and caused overdose. Body not able to process medication.    Topiramate      Note: Unknown.     Outpatient Encounter Medications as of 5/1/2024   Medication Sig  Dispense Refill    nitroglycerin (NITROSTAT) 0.4 MG SL Tab Place 1 Tablet under the tongue as needed for Chest Pain for up to 30 days. DISSOLVE ONE TABLET UNDER THE TONGUE EVERY 5 MINUTES AS NEEDED FOR CHEST PAIN DO NOT EXCEED A TOTAL OF 3 DOSES IN 15 MINUTES 25 Tablet 6    isosorbide mononitrate SR (IMDUR) 60 MG TABLET SR 24 HR Take 0.5 Tablets by mouth every day. 100 Tablet 1    apixaban (ELIQUIS) 5mg Tab Take 1 Tablet by mouth 2 times a day. 60 Tablet 1    amLODIPine (NORVASC) 10 MG Tab Take 1 Tablet by mouth every day. 30 Tablet 1    atorvastatin (LIPITOR) 80 MG tablet Take 1 Tablet by mouth every day. 30 Tablet 1    carvedilol (COREG) 12.5 MG Tab Take 1 Tablet by mouth 2 times a day. 60 Tablet 1    clopidogrel (PLAVIX) 75 MG Tab Take 1 Tablet by mouth every day. 90 Tablet 1    losartan (COZAAR) 100 MG Tab Take 1 Tablet by mouth every day. 30 Tablet 1    cyclobenzaprine (FLEXERIL) 5 mg tablet Take 5-10 mg by mouth 3 times a day as needed.      furosemide (LASIX) 40 MG Tab Take 1 Tablet by mouth every day. 30 Tablet 1    diphenhydrAMINE (BENADRYL ALLERGY) 25 MG capsule Take 25 mg by mouth at bedtime as needed (congestion).      acetaminophen (TYLENOL) 500 MG Tab Take 2 Tablets by mouth 3 times a day as needed for Mild Pain. 180 Tablet 0    potassium chloride SA (KDUR) 20 MEQ Tab CR Take 20 mEq by mouth every day.      omeprazole (PRILOSEC) 40 MG delayed-release capsule Take 40 mg by mouth every day.      traZODone (DESYREL) 150 MG Tab Take 300 mg by mouth every evening. 150 mg X 2 tablets with 100 mg X 2 tablets for a total nightly dose of 500 mg      EPINEPHrine (EPIPEN) 0.3 MG/0.3ML Solution Auto-injector solution for injection INJECT CONTENTS OF 1 PEN SUBCUTANEOUSLY AS NEEDED FOR ALLERGIC REACTION MAY REPEAT DOSE AFTER 5 TO 15 MINTUTES 2 Each 0    ondansetron (ZOFRAN ODT) 4 MG TABLET DISPERSIBLE Take 1 Tablet by mouth every 8 hours as needed for Nausea/Vomiting. 12 Tablet 0    [DISCONTINUED] isosorbide  "mononitrate SR (IMDUR) 30 MG TABLET SR 24 HR Take 1 Tablet by mouth every day. 30 Tablet 0    [DISCONTINUED] aspirin (ASA) 81 MG Chew Tab chewable tablet Chew 1 Tablet every day. (Patient not taking: Reported on 5/1/2024) 7 Tablet 0    NON SPECIFIED Take 1 Tablet by mouth 2 times a day. KRATOM      [DISCONTINUED] nitroglycerin (NITROSTAT) 0.4 MG SL Tab Place 1 Tablet under the tongue as needed for Chest Pain for up to 30 days. DISSOLVE ONE TABLET UNDER THE TONGUE EVERY 5 MINUTES AS NEEDED FOR CHEST PAIN DO NOT EXCEED A TOTAL OF 3 DOSES IN 15 MINUTES 25 Tablet 0     No facility-administered encounter medications on file as of 5/1/2024.     Review of Systems   Constitutional:  Positive for malaise/fatigue. Negative for chills and fever.   HENT:  Negative for congestion.    Respiratory:  Positive for shortness of breath. Negative for cough.    Cardiovascular:  Positive for chest pain. Negative for palpitations, orthopnea, leg swelling and PND.   Gastrointestinal:  Negative for abdominal pain and nausea.   Musculoskeletal:  Negative for myalgias.   Skin:  Negative for rash.   Neurological:  Negative for dizziness, loss of consciousness and headaches.   Endo/Heme/Allergies:  Does not bruise/bleed easily.   Psychiatric/Behavioral:  The patient is nervous/anxious. The patient does not have insomnia.               Objective     /72 (BP Location: Left arm, Patient Position: Sitting, BP Cuff Size: Adult)   Pulse 80   Resp 16   Ht 1.727 m (5' 8\")   Wt 107 kg (235 lb)   SpO2 95%   BMI 35.73 kg/m²     Physical Exam  Constitutional:       Appearance: He is well-developed.   HENT:      Head: Normocephalic.   Neck:      Vascular: No JVD.   Cardiovascular:      Rate and Rhythm: Normal rate and regular rhythm.      Heart sounds: Normal heart sounds.   Pulmonary:      Effort: Pulmonary effort is normal. No respiratory distress.      Breath sounds: Normal breath sounds. No wheezing or rales.   Abdominal:      General: Bowel " sounds are normal. There is no distension.      Palpations: Abdomen is soft.      Tenderness: There is no abdominal tenderness.   Musculoskeletal:         General: Normal range of motion.      Cervical back: Normal range of motion and neck supple.   Skin:     General: Skin is warm and dry.      Findings: No rash.   Neurological:      Mental Status: He is alert and oriented to person, place, and time.   Psychiatric:         Mood and Affect: Mood normal.         Behavior: Behavior normal.       NUCLEAR IMAGING INTERPRETATION OF MPI OF 3/18/2024:   No evidence of significant jeopardized viable myocardium or prior myocardial    infarction. EF slightly reduced at 41%.   ECG INTERPRETATION   Negative stress ECG for ischemia.     CONCLUSIONS OF TTE OF 3/1/2024:  Compared to the prior study on 11/24/2023 - no significant changes.   Normal left ventricular chamber size.  The ejection fraction is measured to be 52 % by Irwin's biplane.  Dilated inferior vena cava without inspiratory collapse.  Mild eccentric aortic insufficiency.  Quantification of AI not accurate due to eccentricity and poor Doppler   alignment.  Mild dilatation of ascending aorta 4.0cm.    IMPRESSION OF [facility] OF 2/29/2024:  Severe obstructive one-vessel coronary artery disease involving a large first diagonal branch.  Successful bifurcation percutaneous coronary intervention from the proximal left anterior descending coronary into the proximal first diagonal branch using a culotte technique and a 3.0 x 38 mm Synergy drug-eluting stent postdilated to 3.6 mm proximally  Patent recently placed proximal to mid left anterior descending coronary artery stent.  Normal left heart filling pressures.    Procedures: [facility] OF 2/25/2024:  Coronary arteriograms  Left heart catheterization  IVUS guided angioplasty and placement of a 4.5 by 12mm Synergy megatron drug-eluting stent in proximal  left anterior descending coronary artery.  Angioplasty of first diagonal  branch    IMPRESSION OF [facility] OF 09/24/2021 (CTR):  · Moderate LAD ISR with negative IFR  · Moderate RCA with abnormal IFR s/p successful PCI  · Moderate AI by aortogram  · Normal LVEDP and no significant stenosis with pullback.    Lab Results   Component Value Date/Time    CHOLSTRLTOT 207 (H) 02/25/2024 01:06 AM     (H) 02/25/2024 01:06 AM    HDL 45 02/25/2024 01:06 AM    TRIGLYCERIDE 243 (H) 02/25/2024 01:06 AM        Lab Results   Component Value Date/Time    SODIUM 141 03/18/2024 05:24 AM    POTASSIUM 4.1 03/18/2024 05:24 AM    CHLORIDE 106 03/18/2024 05:24 AM    CO2 24 03/18/2024 05:24 AM    GLUCOSE 110 (H) 03/18/2024 05:24 AM    BUN 16 03/18/2024 05:24 AM    CREATININE 1.16 03/18/2024 05:24 AM    GLOMRATE 49 (L) 11/03/2023 02:57 PM      Lab Results   Component Value Date/Time    ASTSGOT 19 03/18/2024 05:24 AM    ALTSGPT 21 03/18/2024 05:24 AM       Lab Results   Component Value Date/Time    WBC 9.8 03/18/2024 05:24 AM    RBC 4.95 03/18/2024 05:24 AM    HEMOGLOBIN 15.1 03/18/2024 05:24 AM    HEMATOCRIT 44.2 03/18/2024 05:24 AM    MCV 89.3 03/18/2024 05:24 AM    MCH 30.5 03/18/2024 05:24 AM    MCHC 34.2 03/18/2024 05:24 AM    MPV 9.8 03/18/2024 05:24 AM         Assessment & Plan     1. NSTEMI (non-ST elevated myocardial infarction) ([facility])  Holter Monitor Study    isosorbide mononitrate SR (IMDUR) 60 MG TABLET SR 24 HR      2. Coronary artery disease involving native coronary artery of native heart with refractory angina pectoris (HCC)  isosorbide mononitrate SR (IMDUR) 60 MG TABLET SR 24 HR      3. Coronary artery disease without angina pectoris, unspecified vessel or lesion type, unspecified whether native or transplanted heart  nitroglycerin (NITROSTAT) 0.4 MG SL Tab    Holter Monitor Study      4. Primary hypertension        5. Mixed hyperlipidemia  Lipid Profile    Comp Metabolic Panel      6. Gastroesophageal reflux disease with esophagitis without hemorrhage        7. Tobacco abuse        8. History  of posttraumatic stress disorder (PTSD)            Medical Decision Making: Today's Assessment/Status/Plan:      1. CAD, status post numerous previous interventions (2016, 2021 and more recently February 2024 x 2), with recent hospitalization in  March 2024 for chest pain, with normal/negative MPI and echo. Given he is still having chest pain, to increase Imdur from 30mg to 60mg. He can use NTG SL too. To also obtain ZioPatch to assess for any arrhythmias.    2. Hypertension, treated with Coreg, Losartan and Amlodipine, BP is mildly elevated today. May get some benefit from increased Imdur.    3. Hyperlipidemia, treated with Lipitor. To repeat fasting CMP and lipid panel.    4. History of CVA, on Eliquis and statin.    5. GERD, treated/stable.    6. Tobacco use, he is cutting down on smoking.    7. History of PTSD, due to time in the [employer].    As above, labs, ZioPatch and increase Imdur to 60mg.   Follow-up with me in 1 month to assess.  Make use of EMS if chest pain worsens, he states understanding.

## 2024-05-23 ENCOUNTER — HOSPITAL ENCOUNTER (OUTPATIENT)
Dept: RADIOLOGY | Facility: MEDICAL CENTER | Age: 54
End: 2024-05-23
Payer: MEDICAID

## 2024-06-03 ENCOUNTER — APPOINTMENT (OUTPATIENT)
Dept: CARDIOLOGY | Facility: MEDICAL CENTER | Age: 54
End: 2024-06-03
Attending: NURSE PRACTITIONER
Payer: MEDICAID